# Patient Record
Sex: FEMALE | Race: WHITE | NOT HISPANIC OR LATINO | Employment: OTHER | URBAN - METROPOLITAN AREA
[De-identification: names, ages, dates, MRNs, and addresses within clinical notes are randomized per-mention and may not be internally consistent; named-entity substitution may affect disease eponyms.]

---

## 2017-02-06 ENCOUNTER — OFFICE VISIT (OUTPATIENT)
Dept: FAMILY MEDICINE | Facility: CLINIC | Age: 77
End: 2017-02-06
Payer: MEDICARE

## 2017-02-06 VITALS
SYSTOLIC BLOOD PRESSURE: 159 MMHG | HEART RATE: 63 BPM | RESPIRATION RATE: 18 BRPM | DIASTOLIC BLOOD PRESSURE: 105 MMHG | BODY MASS INDEX: 27.31 KG/M2 | WEIGHT: 160 LBS | HEIGHT: 64 IN | TEMPERATURE: 98.3 F

## 2017-02-06 DIAGNOSIS — E03.9 HYPOTHYROIDISM, UNSPECIFIED TYPE: Primary | ICD-10-CM

## 2017-02-06 LAB
T4 FREE SERPL-MCNC: 0.8 NG/DL (ref 0.76–1.46)
TSH SERPL DL<=0.005 MIU/L-ACNC: 4.8 MU/L (ref 0.4–4)

## 2017-02-06 PROCEDURE — 84443 ASSAY THYROID STIM HORMONE: CPT | Performed by: FAMILY MEDICINE

## 2017-02-06 PROCEDURE — 84439 ASSAY OF FREE THYROXINE: CPT | Performed by: FAMILY MEDICINE

## 2017-02-06 PROCEDURE — 36415 COLL VENOUS BLD VENIPUNCTURE: CPT | Performed by: FAMILY MEDICINE

## 2017-02-06 PROCEDURE — 99214 OFFICE O/P EST MOD 30 MIN: CPT | Performed by: FAMILY MEDICINE

## 2017-02-06 RX ORDER — PYRIDOXINE HCL (VITAMIN B6) 100 MG
TABLET ORAL
COMMUNITY
End: 2017-07-01

## 2017-02-06 RX ORDER — THYROID 60 MG/1
60 TABLET ORAL 2 TIMES DAILY
Qty: 180 TABLET | Refills: 3 | Status: CANCELLED | OUTPATIENT
Start: 2017-02-06

## 2017-02-06 RX ORDER — PYRIDOXINE HCL (VITAMIN B6) 100 MG
1 TABLET ORAL DAILY
COMMUNITY
End: 2022-05-03

## 2017-02-06 NOTE — MR AVS SNAPSHOT
"              After Visit Summary   2/6/2017    Catherine Salgado    MRN: [id number]           Patient Information     Date Of Birth          1940        Visit Information        Provider Department      2/6/2017 11:20 AM Hi Gaitan MD [facility]        Today's Diagnoses     Hypothyroidism, unspecified type    -  1       Care Instructions          Thank you for choosing [facility].  You may be receiving a survey in the mail from [facility] regarding your visit today.  Please take a few minutes to complete and return the survey to let us know how we are doing.      Our Clinic hours are:  Mondays    7:20 am - 7 pm  Tues -  Fri  7:20 am - 5 pm    Clinic Phone: [phone]    The clinic lab opens at 7:30 am Mon - Fri and appointments are required.    [facility]  Ph. [phone]  Monday-Thursday 8 am - 7pm  Tues/Wed/Fri 8 am - 5:30 pm               Follow-ups after your visit        Who to contact     If you have questions or need follow up information about today's clinic visit or your schedule please contact [facility] directly at [phone].  Normal or non-critical lab and imaging results will be communicated to you by MyChart, letter or phone within 4 business days after the clinic has received the results. If you do not hear from us within 7 days, please contact the clinic through MyChart or phone. If you have a critical or abnormal lab result, we will notify you by phone as soon as possible.  Submit refill requests through [employer] or call your pharmacy and they will forward the refill request to us. Please allow 3 business days for your refill to be completed.          Additional Information About Your Visit        MyChart Information     [employer] lets you send messages to your doctor, view your test results, renew your prescriptions, schedule appointments and more. To sign up, go to [email or web address] . Click on \"Log in\" on the left " "side of the screen, which will take you to the Welcome page. Then click on \"Sign up Now\" on the right side of the page.     You will be asked to enter the access code listed below, as well as some personal information. Please follow the directions to create your username and password.     Your access code is: [email or web address]  Expires: 2017  1:25 PM     Your access code will  in 90 days. If you need help or a new code, please call your [place] clinic or [phone].        Care EveryWhere ID     This is your Care EveryWhere ID. This could be used by other organizations to access your [place] medical records  [id number]        Your Vitals Were     Pulse Temperature Respirations Height BMI (Body Mass Index)       63 98.3  F (36.8  C) 18 5' 4\" (1.626 m) 27.45 kg/m2        Blood Pressure from Last 3 Encounters:   17 159/105   16 138/84   16 154/91    Weight from Last 3 Encounters:   17 160 lb (72.576 kg)   16 158 lb (71.668 kg)   16 152 lb 6.4 oz (69.128 kg)              We Performed the Following     TSH with free T4 reflex          Today's Medication Changes          These changes are accurate as of: 17  1:25 PM.  If you have any questions, ask your nurse or doctor.               These medicines have changed or have updated prescriptions.        Dose/Directions    thyroid 60 MG tablet   Commonly known as:  ARMOUR THYROID   This may have changed:  when to take this   Used for:  Hyperlipidemia, unspecified hyperlipidemia        Dose:  60 mg   Take 1 tablet (60 mg) by mouth 2 times daily   Quantity:  180 tablet   Refills:  3         Stop taking these medicines if you haven't already. Please contact your care team if you have questions.     ASHWAGANDHA PO   Stopped by:  Hi Gaitan MD           predniSONE 20 MG tablet   Commonly known as:  DELTASONE   Stopped by:  Hi Gaitan MD           UNABLE TO FIND   Stopped by:  Hi Gaitan MD                    Primary " Care Provider Office Phone # Fax #    Trip Correia PA-C [phone]       [address] [address]        Thank you!     Thank you for choosing [facility]  for your care. Our goal is always to provide you with excellent care. Hearing back from our patients is one way we can continue to improve our services. Please take a few minutes to complete the written survey that you may receive in the mail after your visit with us. Thank you!             Your Updated Medication List - Protect others around you: Learn how to safely use, store and throw away your medicines at www.disposemymeds.org.          This list is accurate as of: 2/6/17  1:25 PM.  Always use your most recent med list.                   Brand Name Dispense Instructions for use    aspirin 81 MG tablet      Take 162 mg by mouth 2 times daily       CO Q 10 PO      Take 200 mg by mouth daily       Garlic 500 MG Tabs          Iron 18 MG Tbcr      Take by mouth every 3 days       KRILL OIL PO      Take 500 mg by mouth daily       LUTEIN-ZEAXANTHIN PO      Take 20 mg by mouth daily       PROBIOTIC PO      Take 1 capsule by mouth At Bedtime       SELENIUM PO      Take 1 capsule by mouth daily       thyroid 60 MG tablet    ARMOUR THYROID    180 tablet    Take 1 tablet (60 mg) by mouth 2 times daily       vitamin B complex with vitamin C Tabs tablet      Take 1 tablet by mouth daily       VITAMIN C PO      Take 1,000 mg by mouth daily sometimes       Vitamin D-3 5000 UNITS Tabs      Take 1 tablet by mouth daily.

## 2017-02-06 NOTE — Clinical Note
[facility]  [address]  [place] [address]  Phone: [phone]    February 7, 2017    Catherine Salgado  [facility] [place] [address]          Dear Ms. Salgado,    The results of your recent lab tests were acceptable. Enclosed is a copy of these results.  If you have any further questions or problems, please contact our office.    Sincerely,      Hi Gaitan MD/ la

## 2017-02-06 NOTE — NURSING NOTE
"Initial /105 mmHg  Pulse 63  Temp(Src) 98.3  F (36.8  C)  Resp 18  Ht 5' 4\" (1.626 m)  Wt 160 lb (72.576 kg)  BMI 27.45 kg/m2 Estimated body mass index is 27.45 kg/(m^2) as calculated from the following:    Height as of this encounter: 5' 4\" (1.626 m).    Weight as of this encounter: 160 lb (72.576 kg). .    Chief Complaint   Patient presents with     Thyroid Problem     follow up      CVA     follow up      Macy Ribeiro CMA    "

## 2017-02-06 NOTE — PROGRESS NOTES
SUBJECTIVE:                                                    Catherine Salgado is a 76 year old female who presents to clinic today for the following health issues:      Chief Complaint   Patient presents with     Thyroid Problem     follow up      CVA     follow up          Hypothyroidism Follow-up      Since last visit, patient describes the following symptoms: Weight stable, no hair loss, no skin changes, no constipation, no loose stools       Amount of exercise or physical activity: None    Problems taking medications regularly: No    Medication side effects: none    Diet: regular (no restrictions)        Problem list and histories reviewed & adjusted, as indicated.  Additional history: as documented    OBJECTIVE:  LUNGS: clear to auscultation, normal breath sounds  CV: RRR without murmur  ABD: BS+, soft, nontender, no masses, no hepatosplenomegaly  EXTREMITIES: without joint tenderness, swelling or erythema.  No muscle tenderness or abnormality.  SKIN: No rashes or abnormalities  NEURO:non focal exam    ASSESSMENT:  Hypothyroidism, unspecified type    PLAN:  Orders Placed This Encounter     TSH with free T4 reflex     Garlic 500 MG TABS     Ferrous Fumarate (IRON) 18 MG TBCR     vitamin B complex with vitamin C (VITAMIN  B COMPLEX) TABS tablet

## 2017-02-24 ENCOUNTER — OFFICE VISIT (OUTPATIENT)
Dept: FAMILY MEDICINE | Facility: CLINIC | Age: 77
End: 2017-02-24
Payer: MEDICARE

## 2017-02-24 VITALS
HEART RATE: 62 BPM | TEMPERATURE: 97.5 F | WEIGHT: 160 LBS | HEIGHT: 64 IN | SYSTOLIC BLOOD PRESSURE: 156 MMHG | BODY MASS INDEX: 27.31 KG/M2 | DIASTOLIC BLOOD PRESSURE: 90 MMHG

## 2017-02-24 DIAGNOSIS — E03.9 HYPOTHYROIDISM, UNSPECIFIED TYPE: Primary | ICD-10-CM

## 2017-02-24 LAB — T3 SERPL-MCNC: 157 NG/DL (ref 60–181)

## 2017-02-24 PROCEDURE — 99213 OFFICE O/P EST LOW 20 MIN: CPT | Performed by: FAMILY MEDICINE

## 2017-02-24 PROCEDURE — 36415 COLL VENOUS BLD VENIPUNCTURE: CPT | Performed by: FAMILY MEDICINE

## 2017-02-24 PROCEDURE — 84480 ASSAY TRIIODOTHYRONINE (T3): CPT | Performed by: FAMILY MEDICINE

## 2017-02-24 RX ORDER — VITAMIN E 268 MG
CAPSULE ORAL DAILY
Qty: 30 CAPSULE | COMMUNITY
Start: 2017-02-24 | End: 2017-07-01

## 2017-02-24 NOTE — LETTER
[facility]  [address] [place] [address]  Phone: [phone]    February 27, 2017    Catherine Salgado  [address] [address]          Dear Ms. Salgado,    The results of your recent lab tests were within normal limits.  Component      Latest Ref Rng & Units 2/24/2017   Triiodothyronine (T3)      60 - 181 ng/dL 157   .  If you have any further questions or problems, please contact our office.    Sincerely,      Suresh Kingston MD / cb

## 2017-02-24 NOTE — PATIENT INSTRUCTIONS
Thank you for choosing [facility].  You may be receiving a survey in the mail from [employer] regarding your visit today.  Please take a few minutes to complete and return the survey to let us know how we are doing.      If you have questions or concerns, please contact us via [email or web address] or you can contact your care team at [phone].    Our Clinic hours are:  Monday 6:40 am  to 7:00 pm  Tuesday -Friday 6:40 am to 5:00 pm    The [place] outpatient lab hours are:  Monday - Friday 6:10 am to 4:45 pm  Saturdays 7:00 am to 11:00 am  Appointments are required, call [phone]    If you have clinical questions after hours or would like to schedule an appointment,  call the clinic at [phone].

## 2017-02-24 NOTE — NURSING NOTE
"Chief Complaint   Patient presents with     Thyroid Disease       Initial BP (!) 152/93  Pulse 62  Temp 97.5  F (36.4  C) (Tympanic)  Ht 5' 4\" (1.626 m)  Wt 160 lb (72.6 kg)  BMI 27.46 kg/m2 Estimated body mass index is 27.46 kg/(m^2) as calculated from the following:    Height as of this encounter: 5' 4\" (1.626 m).    Weight as of this encounter: 160 lb (72.6 kg).  Medication Reconciliation: complete  "

## 2017-02-24 NOTE — PROGRESS NOTES
SUBJECTIVE:                                                    Catherine Slagado is a 76 year old female who presents to clinic today for the following health issues:      Hypothyroidism Follow-up      Since last visit, patient describes the following symptoms: Weight stable, no hair loss, no skin changes, no constipation, no loose stools, constipation and hair loss       Amount of exercise or physical activity: 2-3 days/week for an average of 15-30 minutes    Problems taking medications regularly: yes armor    Medication side effects: heart racing and shaky  Diet: regular (no restrictions)  Concern - shaky and heart racing      Onset: 1yr ago     Description:   Patient feels that the [place] is the cause for the shakiness and heart racing she was taking 2 tablets daily 1 in the AM and 1 in the PM she cut it back to 1 in the AM and is doing     Intensity: moderate    Progression of Symptoms:  Improving since cutting back on the [place]     Accompanying Signs & Symptoms:  When she was Taking 2 tablets        Previous history of similar problem:   None before starting Tremont     Precipitating factors:   Worsened by: with 2 tablets of the [place]     Alleviating factors:  Improved by: when she cut back on the [place] to 1 tablet daily        Therapies Tried and outcome: patient has been seen before and blood test done       Patient is a 76 yr old female. She comes in with concerns about her thyroid. She reports that she was diagnosed about 10 yrs ago. She was started on Levothyroxine which she claims did not really help her symptoms. Patient states that she went to a [facility] doctor who started her on armour thyroid. She appeared to have been doing well until recently and now she feels she is overmedicated and wants her labs done to see . Explained to patient that the endocrinologist recommend against using armour thyroid because it is hard to monitor thyroid levels. She was not happy about this . She wanted all Thyroid  "\" labs done . Patient reports that she has been having palpitations but other that since she has been stable with her weight . No other complaints today.     Problem list and histories reviewed & adjusted, as indicated.  Additional history: as documented    Patient Active Problem List   Diagnosis     Hypertension goal BP (blood pressure) < 140/90     Macular degeneration (senile) of retina     Hyperlipidemia     Phlebitis and thrombophlebitis of other deep vessels of lower extremities     Bunion     Generalized osteoarthrosis, unspecified site     HYPERLIPIDEMIA LDL GOAL <130     Advanced directives, counseling/discussion     Hypothyroidism     IT band syndrome     Cerebral infarction (H)     Health Care Home     Past Surgical History   Procedure Laterality Date     Surgical history of -        T&A     Surgical history of -   10/1976     Vaginal hysterectomy, A & P repair     Colonoscopy  5/27/2005     Diverticulosis       Social History   Substance Use Topics     Smoking status: Current Some Day Smoker     Packs/day: 0.25     Years: 40.00     Types: Cigarettes     Last attempt to quit: 10/31/2015     Smokeless tobacco: Never Used     Alcohol use Yes      Comment: rare     Family History   Problem Relation Age of Onset     CEREBROVASCULAR DISEASE Mother      bianca ALBERTASHIRLEY Father      CANCER Father      kidney CA     Cardiovascular Sister      cardiac arrest     Musculoskeletal Disorder Daughter      rotator cuff, knee repair     DIABETES Sister      Musculoskeletal Disorder Daughter      ankle and arm injuries     Musculoskeletal Disorder Daughter      Knees     Hypertension Sister          Current Outpatient Prescriptions   Medication Sig Dispense Refill     vitamin E 400 UNIT capsule Take by mouth daily 30 capsule      OVER-THE-COUNTER Beet Juice 500mg in the AM       Garlic 500 MG TABS        Ferrous Fumarate (IRON) 18 MG TBCR Take by mouth every 3 days       vitamin B complex with vitamin C " "(VITAMIN  B COMPLEX) TABS tablet Take 1 tablet by mouth daily       SELENIUM PO Take 1 capsule by mouth daily       Probiotic Product (PROBIOTIC PO) Take 1 capsule by mouth At Bedtime        thyroid (ARMOUR THYROID) 60 MG tablet Take 1 tablet (60 mg) by mouth 2 times daily (Patient taking differently: Take 60 mg by mouth daily ) 180 tablet 3     aspirin 81 MG tablet Take 162 mg by mouth 2 times daily       Ascorbic Acid (VITAMIN C PO) Take 1,000 mg by mouth daily sometimes       Coenzyme Q10 (CO Q 10 PO) Take 200 mg by mouth daily        LUTEIN-ZEAXANTHIN PO Take 20 mg by mouth daily        Cholecalciferol (VITAMIN D-3) 5000 UNIT TABS Take 1 tablet by mouth daily.       KRILL OIL PO Take 500 mg by mouth daily       Allergies   Allergen Reactions     Gadavist [Gadobutrol] Other (See Comments)     Did ok if benadryl is given.      Ivp Dye [Contrast Dye] Other (See Comments)     Severe arthritic reaction     Penicillins Hives     Wasps [Hornets] Visual Disturbance     Passes out     BP Readings from Last 3 Encounters:   02/24/17 156/90   02/06/17 (!) 159/105   09/20/16 138/84    Wt Readings from Last 3 Encounters:   02/24/17 160 lb (72.6 kg)   02/06/17 160 lb (72.6 kg)   09/20/16 158 lb (71.7 kg)                  Labs reviewed in EPIC    ROS:  Constitutional, HEENT, cardiovascular, pulmonary, gi and gu systems are negative, except as otherwise noted.    OBJECTIVE:                                                    /90  Pulse 62  Temp 97.5  F (36.4  C) (Tympanic)  Ht 5' 4\" (1.626 m)  Wt 160 lb (72.6 kg)  BMI 27.46 kg/m2  Body mass index is 27.46 kg/(m^2).  GENERAL: healthy, alert and no distress  NECK: no adenopathy, no asymmetry, masses, or scars and thyroid normal to palpation  RESP: lungs clear to auscultation - no rales, rhonchi or wheezes  CV: regular rate and rhythm, normal S1 S2, no S3 or S4, no murmur, click or rub, no peripheral edema and peripheral pulses strong  ABDOMEN: soft, nontender, no " hepatosplenomegaly, no masses and bowel sounds normal  MS: no gross musculoskeletal defects noted, no edema    Diagnostic Test Results:  Results for orders placed or performed in visit on 02/24/17   T3, total   Result Value Ref Range    Triiodothyronine (T3) 157 60 - 181 ng/dL        ASSESSMENT/PLAN:                                                    (E03.9) Hypothyroidism, unspecified type  (primary encounter diagnosis)  Comment: Highly  recommend that she stop the armour thyroid. Also recommended being seen by endocrinologist .  Plan: T3, total    Also blood pressure is high today , patient has been told that she is hypertensive but refuses to take medication  .      FUTURE APPOINTMENTS:       - Follow-up visit as needed.    Suresh Kingston MD  [facility]

## 2017-02-24 NOTE — MR AVS SNAPSHOT
After Visit Summary   2/24/2017    Catherine Salgado    MRN: [id number]           Patient Information     Date Of Birth          1940        Visit Information        Provider Department      2/24/2017 10:40 AM Suresh Kingston MD [facility]        Today's Diagnoses     Hypothyroidism, unspecified type    -  1      Care Instructions          Thank you for choosing [facility]  You may be receiving a survey in the mail from [employer] regarding your visit today.  Please take a few minutes to complete and return the survey to let us know how we are doing.      If you have questions or concerns, please contact us via [email or web address] or you can contact your care team at [phone].    Our Clinic hours are:  Monday 6:40 am  to 7:00 pm  Tuesday -Friday 6:40 am to 5:00 pm    The [place] outpatient lab hours are:  Monday - Friday 6:10 am to 4:45 pm  Saturdays 7:00 am to 11:00 am  Appointments are required, call [phone]    If you have clinical questions after hours or would like to schedule an appointment,  call the clinic at [phone].        Follow-ups after your visit        Your next 10 appointments already scheduled     Mar 06, 2017  9:00 AM CST   SHORT with BERTRAM LANDEROS/JAZZ RN   [facility]    [address] [address] [phone]              Who to contact     If you have questions or need follow up information about today's clinic visit or your schedule please contact [facility] directly at [phone].  Normal or non-critical lab and imaging results will be communicated to you by MyChart, letter or phone within 4 business days after the clinic has received the results. If you do not hear from us within 7 days, please contact the clinic through [employer] or phone. If you have a critical or abnormal lab result, we will notify you by phone as soon as possible.  Submit refill requests through  "[employer] or call your pharmacy and they will forward the refill request to us. Please allow 3 business days for your refill to be completed.          Additional Information About Your Visit        MyChart Information     [employer] lets you send messages to your doctor, view your test results, renew your prescriptions, schedule appointments and more. To sign up, go to [email or web address] . Click on \"Log in\" on the left side of the screen, which will take you to the Welcome page. Then click on \"Sign up Now\" on the right side of the page.     You will be asked to enter the access code listed below, as well as some personal information. Please follow the directions to create your username and password.     Your access code is: [email or web address]  Expires: 2017  1:25 PM     Your access code will  in 90 days. If you need help or a new code, please call your [place] clinic or [phone].        Care EveryWhere ID     This is your Care EveryWhere ID. This could be used by other organizations to access your [place] medical records  [id number]        Your Vitals Were     Pulse Temperature Height BMI (Body Mass Index)          62 97.5  F (36.4  C) (Tympanic) 5' 4\" (1.626 m) 27.46 kg/m2         Blood Pressure from Last 3 Encounters:   17 156/90   17 (!) 159/105   16 138/84    Weight from Last 3 Encounters:   17 160 lb (72.6 kg)   17 160 lb (72.6 kg)   16 158 lb (71.7 kg)              We Performed the Following     T3, total          Today's Medication Changes          These changes are accurate as of: 17 11:59 PM.  If you have any questions, ask your nurse or doctor.               These medicines have changed or have updated prescriptions.        Dose/Directions    thyroid 60 MG tablet   Commonly known as:  ARMOUR THYROID   This may have changed:  when to take this   Used for:  Hyperlipidemia, unspecified hyperlipidemia        Dose:  60 mg   Take 1 tablet (60 mg) by mouth 2 " times daily   Quantity:  180 tablet   Refills:  3                Primary Care Provider Office Phone # Fax #    Trip Correia PA-C [phone]       [facility] [address] [address]        Thank you!     Thank you for choosing [facility]  for your care. Our goal is always to provide you with excellent care. Hearing back from our patients is one way we can continue to improve our services. Please take a few minutes to complete the written survey that you may receive in the mail after your visit with us. Thank you!             Your Updated Medication List - Protect others around you: Learn how to safely use, store and throw away your medicines at www.disposemymeds.org.          This list is accurate as of: 2/24/17 11:59 PM.  Always use your most recent med list.                   Brand Name Dispense Instructions for use    aspirin 81 MG tablet      Take 162 mg by mouth 2 times daily       CO Q 10 PO      Take 200 mg by mouth daily       Garlic 500 MG Tabs          Iron 18 MG Tbcr      Take by mouth every 3 days       KRILL OIL PO      Take 500 mg by mouth daily       LUTEIN-ZEAXANTHIN PO      Take 20 mg by mouth daily       OVER-THE-COUNTER      Beet Juice 500mg in the AM       PROBIOTIC PO      Take 1 capsule by mouth At Bedtime       SELENIUM PO      Take 1 capsule by mouth daily       thyroid 60 MG tablet    ARMOUR THYROID    180 tablet    Take 1 tablet (60 mg) by mouth 2 times daily       vitamin B complex with vitamin C Tabs tablet      Take 1 tablet by mouth daily       VITAMIN C PO      Take 1,000 mg by mouth daily sometimes       Vitamin D-3 5000 UNITS Tabs      Take 1 tablet by mouth daily.       vitamin E 400 UNIT capsule     30 capsule    Take by mouth daily

## 2017-02-27 NOTE — PROGRESS NOTES
Please inform patient that test result was within normal parameters.   Thank you.     Suresh Kingston M.D.

## 2017-06-08 ENCOUNTER — HOSPITAL ENCOUNTER (EMERGENCY)
Facility: CLINIC | Age: 77
Discharge: HOME | End: 2017-06-08
Attending: FAMILY MEDICINE | Admitting: FAMILY MEDICINE
Payer: MEDICARE

## 2017-06-08 VITALS
RESPIRATION RATE: 22 BRPM | OXYGEN SATURATION: 92 % | TEMPERATURE: 98 F | DIASTOLIC BLOOD PRESSURE: 87 MMHG | SYSTOLIC BLOOD PRESSURE: 139 MMHG

## 2017-06-08 DIAGNOSIS — M25.511 ACUTE PAIN OF RIGHT SHOULDER: ICD-10-CM

## 2017-06-08 PROCEDURE — 99283 EMERGENCY DEPT VISIT LOW MDM: CPT | Mod: Z6 | Performed by: FAMILY MEDICINE

## 2017-06-08 PROCEDURE — 99282 EMERGENCY DEPT VISIT SF MDM: CPT

## 2017-06-08 ASSESSMENT — ENCOUNTER SYMPTOMS
CARDIOVASCULAR NEGATIVE: 1
NEUROLOGICAL NEGATIVE: 1
ALLERGIC/IMMUNOLOGIC NEGATIVE: 1
EYES NEGATIVE: 1
PSYCHIATRIC NEGATIVE: 1
ENDOCRINE NEGATIVE: 1
RESPIRATORY NEGATIVE: 1
CONSTITUTIONAL NEGATIVE: 1
HEMATOLOGIC/LYMPHATIC NEGATIVE: 1
GASTROINTESTINAL NEGATIVE: 1

## 2017-06-08 NOTE — ED AVS SNAPSHOT
[facility] Emergency Department    [address] [address]    Phone:  [phone]    Fax:  [phone]                                       Catherine Salgado   MRN: [id number]    Department:  [facility] Emergency Department   Date of Visit:  6/8/2017           Patient Information     Date Of Birth          1940        Your diagnoses for this visit were:     Acute pain of right shoulder        You were seen by Mark Zhou MD.      Follow-up Information     Schedule an appointment as soon as possible for a visit with Trip Correia PA-C.    Specialty:  Physician Assistant    Why:  As needed, If symptoms worsen    Contact information:    [address] [address]  [phone]          Discharge Instructions       Warm pack the affected area should the pain recur.  Aspercream would be fine, you may try Tylenol or ibuprofen as needed.  Return to the emergency department for some changes.      24 Hour Appointment Hotline       To make an appointment at any [facility], call [phone] ([phone]). If you don't have a family doctor or clinic, we will help you find one. [place] clinics are conveniently located to serve the needs of you and your family.             Review of your medicines      Our records show that you are taking the medicines listed below. If these are incorrect, please call your family doctor or clinic.        Dose / Directions Last dose taken    aspirin 81 MG tablet   Dose:  162 mg        Take 162 mg by mouth 2 times daily   Refills:  0        CO Q 10 PO   Dose:  200 mg        Take 200 mg by mouth daily   Refills:  0        Garlic 500 MG Tabs        Refills:  0        Iron 18 MG Tbcr        Take by mouth every 3 days   Refills:  0        KRILL OIL PO   Dose:  500 mg        Take 500 mg by mouth daily   Refills:  0        LUTEIN-ZEAXANTHIN PO   Dose:  20 mg        Take 20 mg by mouth daily   Refills:  0         OVER-THE-COUNTER        Beet Juice 500mg in the AM   Refills:  0        PROBIOTIC PO   Dose:  1 capsule        Take 1 capsule by mouth At Bedtime   Refills:  0        SELENIUM PO   Dose:  1 capsule        Take 1 capsule by mouth daily   Refills:  0        thyroid 60 MG tablet   Commonly known as:  ARMOUR THYROID   Dose:  60 mg   Quantity:  180 tablet        Take 1 tablet (60 mg) by mouth 2 times daily   Refills:  3        vitamin B complex with vitamin C Tabs tablet   Dose:  1 tablet        Take 1 tablet by mouth daily   Refills:  0        VITAMIN C PO   Dose:  1000 mg        Take 1,000 mg by mouth daily sometimes   Refills:  0        Vitamin D-3 5000 UNITS Tabs   Dose:  1 tablet        Take 1 tablet by mouth daily.   Refills:  0        vitamin E 400 UNIT capsule   Quantity:  30 capsule        Take by mouth daily   Refills:  0                Orders Needing Specimen Collection     None      Pending Results     No orders found from 6/6/2017 to 6/9/2017.            Pending Culture Results     No orders found from 6/6/2017 to 6/9/2017.            Pending Results Instructions     If you had any lab results that were not finalized at the time of your Discharge, you can call the ED Lab Result RN at [phone]. You will be contacted by this team for any positive Lab results or changes in treatment. The nurses are available 7 days a week from 10A to 6:30P.  You can leave a message 24 hours per day and they will return your call.        Test Results From Your Hospital Stay               Thank you for choosing [facility]       Thank you for choosing [facility] for your care. Our goal is always to provide you with excellent care. Hearing back from our patients is one way we can continue to improve our services. Please take a few minutes to complete the written survey that you may receive in the mail after you visit with us. Thank you!        [employer] Information     Penem[facility] lets you send messages to your doctor, view your test  "results, renew your prescriptions, schedule appointments and more. To sign up, go to [email or web address] . Click on \"Log in\" on the left side of the screen, which will take you to the Welcome page. Then click on \"Sign up Now\" on the right side of the page.     You will be asked to enter the access code listed below, as well as some personal information. Please follow the directions to create your username and password.     Your access code is: [email or web address]  Expires: 2017 10:07 PM     Your access code will  in 90 days. If you need help or a new code, please call your [place] clinic or [phone].        Care EveryWhere ID     This is your Care EveryWhere ID. This could be used by other organizations to access your [place] medical records  [id number]        After Visit Summary       This is your record. Keep this with you and show to your community pharmacist(s) and doctor(s) at your next visit.                  "

## 2017-06-08 NOTE — ED AVS SNAPSHOT
[facility] Emergency Department    [address] [address]    Phone:  [phone]    Fax:  [phone]                                       Catherine Salgado   MRN: [id number]    Department:  [facility] Emergency Department   Date of Visit:  6/8/2017           After Visit Summary Signature Page     I have received my discharge instructions, and my questions have been answered. I have discussed any challenges I see with this plan with the nurse or doctor.    ..........................................................................................................................................  Patient/Patient Representative Signature      ..........................................................................................................................................  Patient Representative Print Name and Relationship to Patient    ..................................................               ................................................  Date                                            Time    ..........................................................................................................................................  Reviewed by Signature/Title    ...................................................              ..............................................  Date                                                            Time

## 2017-06-09 NOTE — ED PROVIDER NOTES
"  History     Chief Complaint   Patient presents with     Shoulder Pain     sharp, radiating up side of head     Hypertension     HPI  Catherine Salgado is a 77 year old female, past medical history significant for hypertension, hyperlipidemia, osteoarthritis, hypothyroidism, CVA, presents to the emergency department with concerns of pain in her right shoulder scapular area that began approximately 2 hours prior to ER presentation.  The pain was sharp and intense and radiated from the periscapular area up into her right neck and right head area.  The patient applied some Aspercreme and took 2 aspirin orally.  At the time of my evaluation she notes absolutely no pain and feels like she has wasted her time coming in.  The patient did not have concerns about her blood pressure concerns were expressed at triage with her elevated blood pressure by staff.  Repeat check of her blood pressure finds much more acceptable readings without directed intervention.  The patient and her  note many strains on her upper back and shoulders over the last several days including planting flowers, pulling weeds, moving boxes at home.  The patient notes no associated chest pain shortness of breath palpitations nausea or vomiting.  She notes no headache per se just the radiating pain from the right scapula.  There is no blurred vision double vision.  No balance changes.  Her  was with her notes no changes in her speech or her behavior.  She seems appropriate.  The patient came in because she was concerned that she had a CVA in the past was atypical where she had \"flulike illness\".      Active Ambulatory Problems     Diagnosis Date Noted     Hypertension goal BP (blood pressure) < 140/90      Macular degeneration (senile) of retina      Hyperlipidemia      Phlebitis and thrombophlebitis of other deep vessels of lower extremities      Bunion      Generalized osteoarthrosis, unspecified site      HYPERLIPIDEMIA LDL GOAL <130 " 10/31/2010     Advanced directives, counseling/discussion 06/26/2013     Hypothyroidism 08/14/2013     IT band syndrome 03/24/2014     Cerebral infarction (H) 10/31/2015     Health Care Home 12/31/2015     Resolved Ambulatory Problems     Diagnosis Date Noted     TOBACCO ABUSE-CONTINUOUS      Past Medical History:   Diagnosis Date     Bunion      Chest pain, unspecified      Generalized osteoarthrosis, unspecified site      Macular degeneration (senile) of retina, unspecified      Other and unspecified hyperlipidemia      Phlebitis and thrombophlebitis of other deep vessels of lower extremities      TOBACCO ABUSE-CONTINUOUS      Unspecified essential hypertension      Past Surgical History:   Procedure Laterality Date     COLONOSCOPY  5/27/2005    Diverticulosis     SURGICAL HISTORY OF -       T&A     SURGICAL HISTORY OF -   10/1976    Vaginal hysterectomy, A & P repair     Social History     Social History     Marital status:      Spouse name: N/A     Number of children: N/A     Years of education: N/A     Occupational History     Not on file.     Social History Main Topics     Smoking status: Current Some Day Smoker     Packs/day: 0.25     Years: 40.00     Types: Cigarettes     Last attempt to quit: 10/31/2015     Smokeless tobacco: Never Used     Alcohol use Yes      Comment: rare     Drug use: No     Sexual activity: Yes     Partners: Male     Birth control/ protection: Surgical     Other Topics Concern     Parent/Sibling W/ Cabg, Mi Or Angioplasty Before 65f 55m? Yes     father     Social History Narrative     Family History   Problem Relation Age of Onset     CEREBROVASCULAR DISEASE Mother      bianca JULIO Father      CANCER Father      kidney CA     Cardiovascular Sister      cardiac arrest     Musculoskeletal Disorder Daughter      rotator cuff, knee repair     DIABETES Sister      Musculoskeletal Disorder Daughter      ankle and arm injuries     Musculoskeletal Disorder Daughter       Knees     Hypertension Sister      No current facility-administered medications for this encounter.      Current Outpatient Prescriptions   Medication     vitamin E 400 UNIT capsule     OVER-THE-COUNTER     Garlic 500 MG TABS     Ferrous Fumarate (IRON) 18 MG TBCR     vitamin B complex with vitamin C (VITAMIN  B COMPLEX) TABS tablet     SELENIUM PO     Probiotic Product (PROBIOTIC PO)     thyroid (ARMOUR THYROID) 60 MG tablet     aspirin 81 MG tablet     Ascorbic Acid (VITAMIN C PO)     KRILL OIL PO     Coenzyme Q10 (CO Q 10 PO)     LUTEIN-ZEAXANTHIN PO     Cholecalciferol (VITAMIN D-3) 5000 UNIT TABS        Allergies   Allergen Reactions     Wasps [Hornets] Anaphylaxis     Passes out     Gadavist [Gadobutrol] Other (See Comments)     Did ok if benadryl is given.      Ivp Dye [Contrast Dye] Other (See Comments)     Severe arthritic reaction     Penicillins Hives       I have reviewed the Medications, Allergies, Past Medical and Surgical History, and Social History in the Epic system.    Review of Systems   Constitutional: Negative.    HENT: Negative.    Eyes: Negative.    Respiratory: Negative.    Cardiovascular: Negative.    Gastrointestinal: Negative.    Endocrine: Negative.    Genitourinary: Negative.    Musculoskeletal:        Shoulder pain   Skin: Negative.    Allergic/Immunologic: Negative.    Neurological: Negative.    Hematological: Negative.    Psychiatric/Behavioral: Negative.        Physical Exam   BP: (!) 185/116  Heart Rate: 61  Temp: 98  F (36.7  C)  Resp: 22  SpO2: 96 %  Physical Exam   Constitutional: She appears well-developed and well-nourished.   HENT:   Head: Normocephalic and atraumatic.   Right Ear: External ear normal.   Left Ear: External ear normal.   Nose: Nose normal.   Mouth/Throat: Oropharynx is clear and moist.   Eyes: Conjunctivae and EOM are normal. Pupils are equal, round, and reactive to light.   Neck: Normal range of motion. Neck supple.   Cardiovascular: Normal rate, regular  rhythm, normal heart sounds and intact distal pulses.    Musculoskeletal:        Back:    Nursing note and vitals reviewed.      ED Course     ED Course     Procedures             Critical Care time:  none               Labs Ordered and Resulted from Time of ED Arrival Up to the Time of Departure from the ED - No data to display    Assessments & Plan (with Medical Decision Making)   77-year-old female past medical history reviewed as above, presents to the emergency department with concerns of shoulder pain as described in the HPI.  Physical exam finds the patient completely asymptomatic, having taken Aspercreme and aspirin prior to arrival.  I was however on exam able to elicit some point tenderness and reproduce the pain patient came in with.  I think that her pain is most likely musculoskeletal in etiology, potentially some irritation of scapulothoracic nerve, I have low suspicion based upon history and physical exam as well as behavior with therapeutic intervention prior to her arrival that this represents anything other something potentially more serious.  I did discuss differential diagnosis with him and they decided against any further workup.  I think this is perfectly acceptable and reasonable.  I do not feel the patient requires additional workup at this time.  I do expect that she will likely have some pain come back on the medications wear off and we discussed this.  Should she have any additional concerns she is welcome to return at any time.  Disposition home to the care of her  in improved condition.      Disclaimer: This note consists of symbols derived from keyboarding, dictation and/or voice recognition software. As a result, there may be errors in the script that have gone undetected. Please consider this when interpreting information found in this chart.      I have reviewed the nursing notes.    I have reviewed the findings, diagnosis, plan and need for follow up with the  patient.    Discharge Medication List as of 6/8/2017 10:10 PM          Final diagnoses:   Acute pain of right shoulder       6/8/2017   [facility] EMERGENCY DEPARTMENT     Mark Zhou MD  06/09/17 [id number]

## 2017-06-09 NOTE — ED NOTES
"Pt states that around 2030 developed pain by right shoulder blade and that \"sharp\" pain traveled up into ear and onto the top of her head. Pain was reproducible upon palpation by patient @ home. Pt states \"felt like a pinched nerve\". Sig other states that pt was helping him lift some wood 2 days ago. Pt also is concerned regarding high blood pressure, HX hypertension but stopped her blood medication \"months ago\" due to the side effect of coughing. Shoulder blade pain has resolved since arrival, denies CP, headache or shortness of breath. HX of stroke in Oct 2015, no deficits noted. Took 2 baby ASA tonight PTA.  "

## 2017-06-09 NOTE — DISCHARGE INSTRUCTIONS
Warm pack the affected area should the pain recur.  Aspercream would be fine, you may try Tylenol or ibuprofen as needed.  Return to the emergency department for some changes.

## 2017-07-01 ENCOUNTER — APPOINTMENT (OUTPATIENT)
Dept: CT IMAGING | Facility: CLINIC | Age: 77
End: 2017-07-01
Attending: EMERGENCY MEDICINE
Payer: MEDICARE

## 2017-07-01 ENCOUNTER — HOSPITAL ENCOUNTER (EMERGENCY)
Facility: CLINIC | Age: 77
Discharge: HOME | End: 2017-07-01
Attending: EMERGENCY MEDICINE | Admitting: EMERGENCY MEDICINE
Payer: MEDICARE

## 2017-07-01 VITALS
DIASTOLIC BLOOD PRESSURE: 96 MMHG | RESPIRATION RATE: 10 BRPM | OXYGEN SATURATION: 89 % | HEART RATE: 56 BPM | TEMPERATURE: 98 F | SYSTOLIC BLOOD PRESSURE: 159 MMHG

## 2017-07-01 DIAGNOSIS — M54.2 NECK PAIN: ICD-10-CM

## 2017-07-01 DIAGNOSIS — R03.0 ELEVATED BLOOD PRESSURE READING WITHOUT DIAGNOSIS OF HYPERTENSION: ICD-10-CM

## 2017-07-01 LAB
ALBUMIN SERPL-MCNC: 3.4 G/DL (ref 3.4–5)
ALBUMIN UR-MCNC: NEGATIVE MG/DL
ALP SERPL-CCNC: 69 U/L (ref 40–150)
ALT SERPL W P-5'-P-CCNC: 20 U/L (ref 0–50)
ANION GAP SERPL CALCULATED.3IONS-SCNC: 7 MMOL/L (ref 3–14)
APPEARANCE UR: CLEAR
AST SERPL W P-5'-P-CCNC: 28 U/L (ref 0–45)
BASOPHILS # BLD AUTO: 0 10E9/L (ref 0–0.2)
BASOPHILS NFR BLD AUTO: 0.6 %
BILIRUB SERPL-MCNC: 0.3 MG/DL (ref 0.2–1.3)
BILIRUB UR QL STRIP: NEGATIVE
BUN SERPL-MCNC: 18 MG/DL (ref 7–30)
CALCIUM SERPL-MCNC: 9.5 MG/DL (ref 8.5–10.1)
CHLORIDE SERPL-SCNC: 107 MMOL/L (ref 94–109)
CO2 SERPL-SCNC: 26 MMOL/L (ref 20–32)
COLOR UR AUTO: ABNORMAL
CREAT SERPL-MCNC: 0.8 MG/DL (ref 0.52–1.04)
DIFFERENTIAL METHOD BLD: NORMAL
EOSINOPHIL # BLD AUTO: 0.4 10E9/L (ref 0–0.7)
EOSINOPHIL NFR BLD AUTO: 5.5 %
ERYTHROCYTE [DISTWIDTH] IN BLOOD BY AUTOMATED COUNT: 14.3 % (ref 10–15)
GFR SERPL CREATININE-BSD FRML MDRD: 70 ML/MIN/1.7M2
GLUCOSE SERPL-MCNC: 107 MG/DL (ref 70–99)
GLUCOSE UR STRIP-MCNC: NEGATIVE MG/DL
HCT VFR BLD AUTO: 44.6 % (ref 35–47)
HGB BLD-MCNC: 15 G/DL (ref 11.7–15.7)
HGB UR QL STRIP: NEGATIVE
IMM GRANULOCYTES # BLD: 0 10E9/L (ref 0–0.4)
IMM GRANULOCYTES NFR BLD: 0.1 %
KETONES UR STRIP-MCNC: NEGATIVE MG/DL
LEUKOCYTE ESTERASE UR QL STRIP: ABNORMAL
LYMPHOCYTES # BLD AUTO: 2.7 10E9/L (ref 0.8–5.3)
LYMPHOCYTES NFR BLD AUTO: 38.9 %
MCH RBC QN AUTO: 31.3 PG (ref 26.5–33)
MCHC RBC AUTO-ENTMCNC: 33.6 G/DL (ref 31.5–36.5)
MCV RBC AUTO: 93 FL (ref 78–100)
MONOCYTES # BLD AUTO: 0.7 10E9/L (ref 0–1.3)
MONOCYTES NFR BLD AUTO: 9.9 %
NEUTROPHILS # BLD AUTO: 3.1 10E9/L (ref 1.6–8.3)
NEUTROPHILS NFR BLD AUTO: 45 %
NITRATE UR QL: NEGATIVE
PH UR STRIP: 7.5 PH (ref 5–7)
PLATELET # BLD AUTO: 236 10E9/L (ref 150–450)
POTASSIUM SERPL-SCNC: 4 MMOL/L (ref 3.4–5.3)
PROT SERPL-MCNC: 7.5 G/DL (ref 6.8–8.8)
RBC # BLD AUTO: 4.8 10E12/L (ref 3.8–5.2)
RBC #/AREA URNS AUTO: 0 /HPF (ref 0–2)
SODIUM SERPL-SCNC: 140 MMOL/L (ref 133–144)
SP GR UR STRIP: 1.01 (ref 1–1.03)
SQUAMOUS #/AREA URNS AUTO: <1 /HPF (ref 0–1)
TROPONIN I SERPL-MCNC: NORMAL UG/L (ref 0–0.04)
URN SPEC COLLECT METH UR: ABNORMAL
UROBILINOGEN UR STRIP-MCNC: NORMAL MG/DL (ref 0–2)
WBC # BLD AUTO: 7 10E9/L (ref 4–11)
WBC #/AREA URNS AUTO: 2 /HPF (ref 0–2)

## 2017-07-01 PROCEDURE — 81001 URINALYSIS AUTO W/SCOPE: CPT | Performed by: EMERGENCY MEDICINE

## 2017-07-01 PROCEDURE — 70450 CT HEAD/BRAIN W/O DYE: CPT

## 2017-07-01 PROCEDURE — 25000125 ZZHC RX 250: Performed by: EMERGENCY MEDICINE

## 2017-07-01 PROCEDURE — 71260 CT THORAX DX C+: CPT

## 2017-07-01 PROCEDURE — 80053 COMPREHEN METABOLIC PANEL: CPT | Performed by: EMERGENCY MEDICINE

## 2017-07-01 PROCEDURE — 25000128 H RX IP 250 OP 636: Performed by: EMERGENCY MEDICINE

## 2017-07-01 PROCEDURE — 96375 TX/PRO/DX INJ NEW DRUG ADDON: CPT

## 2017-07-01 PROCEDURE — 99285 EMERGENCY DEPT VISIT HI MDM: CPT | Mod: 25

## 2017-07-01 PROCEDURE — 84484 ASSAY OF TROPONIN QUANT: CPT | Performed by: EMERGENCY MEDICINE

## 2017-07-01 PROCEDURE — 70498 CT ANGIOGRAPHY NECK: CPT

## 2017-07-01 PROCEDURE — 93010 ELECTROCARDIOGRAM REPORT: CPT | Performed by: EMERGENCY MEDICINE

## 2017-07-01 PROCEDURE — 96374 THER/PROPH/DIAG INJ IV PUSH: CPT

## 2017-07-01 PROCEDURE — 96361 HYDRATE IV INFUSION ADD-ON: CPT

## 2017-07-01 PROCEDURE — 85025 COMPLETE CBC W/AUTO DIFF WBC: CPT | Performed by: EMERGENCY MEDICINE

## 2017-07-01 PROCEDURE — 93005 ELECTROCARDIOGRAM TRACING: CPT

## 2017-07-01 PROCEDURE — 99285 EMERGENCY DEPT VISIT HI MDM: CPT | Mod: 25 | Performed by: EMERGENCY MEDICINE

## 2017-07-01 RX ORDER — IOPAMIDOL 755 MG/ML
67 INJECTION, SOLUTION INTRAVASCULAR ONCE
Status: COMPLETED | OUTPATIENT
Start: 2017-07-01 | End: 2017-07-01

## 2017-07-01 RX ORDER — ONDANSETRON 2 MG/ML
4 INJECTION INTRAMUSCULAR; INTRAVENOUS EVERY 30 MIN PRN
Status: DISCONTINUED | OUTPATIENT
Start: 2017-07-01 | End: 2017-07-01 | Stop reason: HOSPADM

## 2017-07-01 RX ORDER — IOPAMIDOL 755 MG/ML
70 INJECTION, SOLUTION INTRAVASCULAR ONCE
Status: COMPLETED | OUTPATIENT
Start: 2017-07-01 | End: 2017-07-01

## 2017-07-01 RX ORDER — METHYLPREDNISOLONE SODIUM SUCCINATE 125 MG/2ML
125 INJECTION, POWDER, LYOPHILIZED, FOR SOLUTION INTRAMUSCULAR; INTRAVENOUS ONCE
Status: COMPLETED | OUTPATIENT
Start: 2017-07-01 | End: 2017-07-01

## 2017-07-01 RX ORDER — HYDROMORPHONE HCL/0.9% NACL/PF 0.2MG/0.2
0.2 SYRINGE (ML) INTRAVENOUS EVERY 30 MIN PRN
Status: DISCONTINUED | OUTPATIENT
Start: 2017-07-01 | End: 2017-07-01 | Stop reason: HOSPADM

## 2017-07-01 RX ADMIN — HYDROMORPHONE HYDROCHLORIDE 0.2 MG: 1 INJECTION, SOLUTION INTRAMUSCULAR; INTRAVENOUS; SUBCUTANEOUS at 09:32

## 2017-07-01 RX ADMIN — IOPAMIDOL 70 ML: 755 INJECTION, SOLUTION INTRAVENOUS at 11:43

## 2017-07-01 RX ADMIN — ONDANSETRON 4 MG: 2 INJECTION INTRAMUSCULAR; INTRAVENOUS at 09:25

## 2017-07-01 RX ADMIN — IOPAMIDOL 67 ML: 755 INJECTION, SOLUTION INTRAVENOUS at 11:44

## 2017-07-01 RX ADMIN — SODIUM CHLORIDE 1000 ML: 9 INJECTION, SOLUTION INTRAVENOUS at 11:43

## 2017-07-01 RX ADMIN — SODIUM CHLORIDE 500 ML: 9 INJECTION, SOLUTION INTRAVENOUS at 09:25

## 2017-07-01 RX ADMIN — SODIUM CHLORIDE 98 ML: 9 INJECTION, SOLUTION INTRAVENOUS at 11:42

## 2017-07-01 RX ADMIN — METHYLPREDNISOLONE SODIUM SUCCINATE 125 MG: 125 INJECTION, POWDER, FOR SOLUTION INTRAMUSCULAR; INTRAVENOUS at 09:27

## 2017-07-01 ASSESSMENT — ENCOUNTER SYMPTOMS
CONSTITUTIONAL NEGATIVE: 1
EYES NEGATIVE: 1
NECK PAIN: 1
CARDIOVASCULAR NEGATIVE: 1
BACK PAIN: 1
NEUROLOGICAL NEGATIVE: 1
PSYCHIATRIC NEGATIVE: 1
ALLERGIC/IMMUNOLOGIC NEGATIVE: 1
GASTROINTESTINAL NEGATIVE: 1
ENDOCRINE NEGATIVE: 1
RESPIRATORY NEGATIVE: 1
HEMATOLOGIC/LYMPHATIC NEGATIVE: 1

## 2017-07-01 NOTE — ED AVS SNAPSHOT
[facility]    [address] [address]    Phone:  [phone]    Fax:  [phone]                                       Catherine Salgado   MRN: [id number]    Department:  [facility]   Date of Visit:  7/1/2017           Patient Information     Date Of Birth          1940        Your diagnoses for this visit were:     Neck pain unclear cause, with spikes in blood pressure    Elevated blood pressure reading without diagnosis of hypertension        You were seen by Landry Gann MD.      Follow-up Information     Follow up with Trip Correia PA-C In 1 week.    Specialty:  Physician Assistant    Why:  As needed, If symptoms worsen. Also to discuss blood pressure medications.    Contact information:    [address] [address] [phone]          Follow up with [facility].    Specialty:  EMERGENCY MEDICINE    Why:  As needed, If symptoms worsen    Contact information:    [address] [place] [address] [phone]    Additional information:    The medical center is located at   [address] [place] [address] ([facility] [address]   [facility] [place], [place] miles north   of [place]).      Discharge References/Attachments     NECK PAIN (ENGLISH)    HYPERTENSION, TO BE CONFIRMED (ENGLISH)      Future Appointments        Provider Department Dept Phone Center    7/5/2017 2:00 PM Hi Gaitan MD [facility] [phone] [facility]      24 Hour Appointment Hotline       To make an appointment at any [facility], call [phone] ([phone]). If you don't have a family doctor or clinic, we will help you find one. [facility] are conveniently located to serve the needs of you and your family.             Review of your medicines      Our records show that you are taking the medicines listed below. If these are incorrect, please call  your family doctor or clinic.        Dose / Directions Last dose taken    aspirin 81 MG tablet   Dose:  162 mg        Take 162 mg by mouth every evening   Refills:  0        Garlic 500 MG Tabs        Refills:  0        VITAMIN C PO   Dose:  1000 mg        Take 1,000 mg by mouth daily sometimes   Refills:  0        Vitamin D-3 5000 UNITS Tabs   Dose:  1 tablet        Take 1 tablet by mouth daily.   Refills:  0                Procedures and tests performed during your visit     CBC with platelets differential    CT Chest & Pulmonary Embol Angio w Con    CT Head Neck Angio w/o & w Contrast    Comprehensive metabolic panel    EKG 12 lead    Head CT w/o contrast    Troponin I    UA reflex to Microscopic and Culture      Orders Needing Specimen Collection     None      Pending Results     Date and Time Order Name Status Description    7/1/2017 0915 CT Chest & Pulmonary Embol Angio w Con Preliminary             Pending Culture Results     No orders found from 6/29/2017 to 7/2/2017.            Pending Results Instructions     If you had any lab results that were not finalized at the time of your Discharge, you can call the ED Lab Result RN at [phone]. You will be contacted by this team for any positive Lab results or changes in treatment. The nurses are available 7 days a week from 10A to 6:30P.  You can leave a message 24 hours per day and they will return your call.        Test Results From Your Hospital Stay        7/1/2017  9:53 AM      Component Results     Component Value Ref Range & Units Status    WBC 7.0 4.0 - 11.0 10e9/L Final    RBC Count 4.80 3.8 - 5.2 10e12/L Final    Hemoglobin 15.0 11.7 - 15.7 g/dL Final    Hematocrit 44.6 35.0 - 47.0 % Final    MCV 93 78 - 100 fl Final    MCH 31.3 26.5 - 33.0 pg Final    MCHC 33.6 31.5 - 36.5 g/dL Final    RDW 14.3 10.0 - 15.0 % Final    Platelet Count 236 150 - 450 10e9/L Final    Diff Method Automated Method  Final    % Neutrophils 45.0 % Final    % Lymphocytes 38.9 %  Final    % Monocytes 9.9 % Final    % Eosinophils 5.5 % Final    % Basophils 0.6 % Final    % Immature Granulocytes 0.1 % Final    Absolute Neutrophil 3.1 1.6 - 8.3 10e9/L Final    Absolute Lymphocytes 2.7 0.8 - 5.3 10e9/L Final    Absolute Monocytes 0.7 0.0 - 1.3 10e9/L Final    Absolute Eosinophils 0.4 0.0 - 0.7 10e9/L Final    Absolute Basophils 0.0 0.0 - 0.2 10e9/L Final    Abs Immature Granulocytes 0.0 0 - 0.4 10e9/L Final         7/1/2017 10:11 AM      Component Results     Component Value Ref Range & Units Status    Sodium 140 133 - 144 mmol/L Final    Potassium 4.0 3.4 - 5.3 mmol/L Final    Chloride 107 94 - 109 mmol/L Final    Carbon Dioxide 26 20 - 32 mmol/L Final    Anion Gap 7 3 - 14 mmol/L Final    Glucose 107 (H) 70 - 99 mg/dL Final    Urea Nitrogen 18 7 - 30 mg/dL Final    Creatinine 0.80 0.52 - 1.04 mg/dL Final    GFR Estimate 70 >60 mL/min/1.7m2 Final    Non  GFR Calc    GFR Estimate If Black 85 >60 mL/min/1.7m2 Final    African American GFR Calc    Calcium 9.5 8.5 - 10.1 mg/dL Final    Bilirubin Total 0.3 0.2 - 1.3 mg/dL Final    Albumin 3.4 3.4 - 5.0 g/dL Final    Protein Total 7.5 6.8 - 8.8 g/dL Final    Alkaline Phosphatase 69 40 - 150 U/L Final    ALT 20 0 - 50 U/L Final    AST 28 0 - 45 U/L Final         7/1/2017 10:11 AM      Component Results     Component Value Ref Range & Units Status    Troponin I ES  0.000 - 0.045 ug/L Final    <0.015  The 99th percentile for upper reference range is 0.045 ug/L.  Troponin values in   the range of 0.045 - 0.120 ug/L may be associated with risks of adverse   clinical events.           7/1/2017  1:14 PM      Narrative     CTA ANGIOGRAM HEAD NECK  7/1/2017 11:48 AM     HISTORY:  Atraumatic neck pain and discomfort. Hypertension. Rule out  dissection.    TECHNIQUE: Axial images were obtained through the head and neck  without and with intravenous contrast. 70 mL of Isovue-370 was given.  Multiplanar reconstructions were performed. 3-D  reconstructions off a  remote workstation for CT angiography were also acquired. Carotid  stenoses were evaluated by comparing the caliber of the proximal  internal carotid artery to the caliber of the distal internal carotid  artery. Radiation dose for this scan was reduced using automated  exposure control, adjustment of the mA and/or kV according to patient  size, or iterative reconstruction technique.    FINDINGS:  Brachiocephalic vessels: There is moderate thickening and  atherosclerotic disease of the proximal left subclavian artery, but  there is no stenosis and no evidence for dissection. There is also  some mild atherosclerotic disease of the right subclavian artery  without stenosis.    Right carotid system: Mild plaque. No evidence for stenosis or  dissection.    Left carotid system: Mild plaque. No evidence for stenosis or  dissection.    Right vertebral artery: Minimal calcified plaque is seen at the  origin. No stenosis or dissection.    Left vertebral artery: Normal.    [place]: There is moderate atherosclerotic disease involving  the carotid siphon regions bilaterally with approximately 50% stenosis  of the left cavernous carotid. The basilar artery is widely patent.  The proximal anterior, middle, and posterior cerebral arteries are  patent.    Other findings: None.        Impression     IMPRESSION:  1. Atherosclerotic disease involving both carotid bifurcations, the  carotid siphon regions, the subclavian arteries, and the right  vertebral artery without significant stenosis.  2. No evidence for dissection, thromboembolism, significant stenosis,  or aneurysm.    WINNIE AVERY MD               7/1/2017 11:22 AM      Component Results     Component Value Ref Range & Units Status    Color Urine Straw  Final    Appearance Urine Clear  Final    Glucose Urine Negative NEG mg/dL Final    Bilirubin Urine Negative NEG Final    Ketones Urine Negative NEG mg/dL Final    Specific Gravity Urine 1.007  1.003 - 1.035 Final    Blood Urine Negative NEG Final    pH Urine 7.5 (H) 5.0 - 7.0 pH Final    Protein Albumin Urine Negative NEG mg/dL Final    Urobilinogen mg/dL Normal 0.0 - 2.0 mg/dL Final    Nitrite Urine Negative NEG Final    Leukocyte Esterase Urine Small (A) NEG Final    Source Midstream Urine  Final    RBC Urine 0 0 - 2 /HPF Final    WBC Urine 2 0 - 2 /HPF Final    Squamous Epithelial /HPF Urine <1 0 - 1 /HPF Final         7/1/2017 11:59 AM      Narrative     CT HEAD W/O CONTRAST 7/1/2017 11:48 AM     HISTORY:  neck pain. eval for bleed vs mass lesion vs other    TECHNIQUE:  Axial images of the head without IV contrast material.  Radiation dose for this scan was reduced using automated exposure  control, adjustment of the mA and/or kV according to patient size, or  iterative reconstruction technique.    COMPARISON: None.    FINDINGS: No intracranial hemorrhage, mass lesion, or acute infarct is  seen. No skull fracture.   There is some bilateral white matter low-density, likely related to  small vessel ischemic disease.        Impression     IMPRESSION:  No acute intracranial pathology.    IQRA SOLER MD         7/1/2017 12:02 PM      Narrative     CTA ANGIOGRAM CHEST AND PULMONARY EMBOLISM  7/1/2017 11:50 AM    HISTORY:  Chest pain and back pain.    TECHNIQUE: Scans obtained from the apices through the diaphragm with  IV contrast. 67 mL Isovue 370 injected. Radiation dose for this scan  was reduced using automated exposure control, adjustment of the mA  and/or kV according to patient size, or iterative reconstruction  technique.    COMPARISON:  Chest x-ray 4/20/2012.    FINDINGS:  No acute thoracic aortic abnormality. Thoracic aortic and  coronary artery calcifications. No evidence for pulmonary embolism. No  lobar consolidation identified. There are a few scattered lymph nodes  seen in the chest. Right hilar region mildly enlarged lymph node is  1.8 cm, series 4 image 63. Granuloma medial right lung base,  "series 5  image 112. There is subpleural atelectasis noted bilaterally. Very  mild patchy groundglass opacities bilaterally at the upper lungs.  Upper abdomen images do not show any acute abnormalities. A few tiny  gallstones.        Impression     IMPRESSION:  1. No pulmonary embolism, or acute thoracic aortic abnormality.  2. Minimal patchy groundglass opacities at the bilateral upper lungs  could represent very mild edema versus an infectious or inflammatory  cause.  3. Nonspecific mildly enlarged right hilar lymph nodes.  4. A few small gallstones.                Thank you for choosing [facility]       Thank you for choosing [facility] for your care. Our goal is always to provide you with excellent care. Hearing back from our patients is one way we can continue to improve our services. Please take a few minutes to complete the written survey that you may receive in the mail after you visit with us. Thank you!        [employer] Information     [employer] lets you send messages to your doctor, view your test results, renew your prescriptions, schedule appointments and more. To sign up, go to [email or web address] . Click on \"Log in\" on the left side of the screen, which will take you to the Welcome page. Then click on \"Sign up Now\" on the right side of the page.     You will be asked to enter the access code listed below, as well as some personal information. Please follow the directions to create your username and password.     Your access code is: [email or web address]  Expires: 2017 10:07 PM     Your access code will  in 90 days. If you need help or a new code, please call your [facility] clinic or [phone].        Care EveryWhere ID     This is your Care EveryWhere ID. This could be used by other organizations to access your [facility] medical records  [id number]        Equal Access to Services     TERESA SHELTON AH: Nicho Wright, tonya dodd, micheline hackett " marysol murray ah. So [facility] [phone].    ATENCIÓN: Si habla español, tiene a sebastian disposición servicios gratuitos de asistencia lingüística. Llame al [phone].    We comply with applicable federal civil rights laws and [place] laws. We do not discriminate on the basis of race, color, national origin, age, disability sex, sexual orientation or gender identity.            After Visit Summary       This is your record. Keep this with you and show to your community pharmacist(s) and doctor(s) at your next visit.

## 2017-07-01 NOTE — ED PROVIDER NOTES
History     Chief Complaint   Patient presents with     Neck Pain     HPI  Catherine Salgado is a 77 year old female who has a history of cerebral infarction, hypertension, hyperlipidemia, and hypothyroidism who presents to the ED for evaluation of neck pain. Patient was seen in the ED on 06/08/2017 by Dr. Zhou for the same pain that originated in the right shoulder and radiated up the side of the head, only on the right side. Patient was discharged home with the relief of Aspercreme. Today, patient reports that she was woken up from sleep at 730 AM with severe pain in the left shoulder that radiates into the face. She applied Aspercreme to the right shoulder and right neck which did not alleviate her pain. She then applied an ice pack to the area which made her pain worse. After this, her pain then traveled from the right side to the left side. Her pain is now only on the left side in the shoulder, neck, and face. She reports that her pain is worse. She notes that she does have some numbness in the right hand, worse with the blood pressure cuff. She takes 2 81 mg aspirin at night and garlic in the morning. She smokes 1 pack of cigarettes per day which is more than her usual. Patient had a stroke in October of 2015.      Social History:  She lives in [place], [place]. She is here in the ED via private car with .    Past Medical History:  Past Medical History:   Diagnosis Date     Bunion     Bilateral     Chest pain, unspecified      Generalized osteoarthrosis, unspecified site     Cervical     Macular degeneration (senile) of retina, unspecified      Other and unspecified hyperlipidemia      Phlebitis and thrombophlebitis of other deep vessels of lower extremities     DVT on OCP     TOBACCO ABUSE-CONTINUOUS      Unspecified essential hypertension        Medications:  Current Outpatient Prescriptions   Medication Sig Dispense Refill     Garlic 500 MG TABS        aspirin 81 MG tablet Take 162 mg by mouth every  evening        Cholecalciferol (VITAMIN D-3) 5000 UNIT TABS Take 1 tablet by mouth daily.       Ascorbic Acid (VITAMIN C PO) Take 1,000 mg by mouth daily sometimes         Allergies:  Allergies   Allergen Reactions     Wasps [Hornets] Anaphylaxis     Passes out     Gadavist [Gadobutrol] Other (See Comments)     Did ok if benadryl is given.      Ivp Dye [Contrast Dye] Other (See Comments)     Severe arthritic reaction     Penicillins Hives       I have reviewed the Medications, Allergies, Past Medical and Surgical History, and Social History in the Epic system.    Review of Systems   Constitutional: Negative.    HENT: Negative.    Eyes: Negative.    Respiratory: Negative.    Cardiovascular: Negative.    Gastrointestinal: Negative.    Endocrine: Negative.    Genitourinary: Negative.    Musculoskeletal: Positive for back pain and neck pain.   Skin: Negative.    Allergic/Immunologic: Negative.    Neurological: Negative.    Hematological: Negative.    Psychiatric/Behavioral: Negative.        Physical Exam      Physical Exam   Constitutional: She is oriented to person, place, and time. She appears well-developed. She appears distressed.   HENT:   Head: Normocephalic and atraumatic.   Eyes: Conjunctivae and EOM are normal. Pupils are equal, round, and reactive to light. Right eye exhibits no discharge. Left eye exhibits no discharge. No scleral icterus.   Neck: Normal range of motion. Neck supple. No JVD present. No tracheal deviation present. No thyromegaly present.   Cardiovascular: Normal rate.  Exam reveals no gallop and no friction rub.    Pulmonary/Chest: Effort normal. No stridor.   Lymphadenopathy:     She has no cervical adenopathy.   Neurological: She is alert and oriented to person, place, and time.   Skin: No rash noted. No erythema. No pallor.   Psychiatric: She has a normal mood and affect. Her behavior is normal. Judgment and thought content normal.       ED Course     ED Course     Procedures              EKG Interpretation:      Interpreted by Landry Gann  Time reviewed: 9:30AM  Symptoms at time of EKG: neck pain   Rhythm: normal sinus   Rate: Normal  Axis: Normal  Ectopy: none  Conduction: normal  ST Segments/ T Waves: Non-specific ST-T wave changes  Q Waves: nonspecific  Comparison to prior: Unchanged from 10/31/15    Clinical Impression: no acute changes        Critical Care time:  none                 ED Medications:  Medications   HYDROmorphone (DILAUDID) injection 0.2 mg (0.2 mg Intravenous Given 7/1/17 0932)   ondansetron (ZOFRAN) injection 4 mg (4 mg Intravenous Given 7/1/17 0925)   0.9% sodium chloride BOLUS (0 mLs Intravenous Stopped 7/1/17 1225)   methylPREDNISolone sodium succinate (solu-MEDROL) injection 125 mg (125 mg Intravenous Given 7/1/17 0927)   iopamidol (ISOVUE-370) solution 67 mL (67 mLs Intravenous Given 7/1/17 1144)   sodium chloride 0.9 % for CT scan flush dose 98 mL (98 mLs Intravenous Given 7/1/17 1142)   iopamidol (ISOVUE-370) solution 70 mL (70 mLs Intravenous Given 7/1/17 1143)   sodium chloride 0.9 % for CT scan flush dose 100 mL (1,000 mLs Intravenous Given 7/1/17 1143)       ED Vitals:  Vitals:    07/01/17 0930 07/01/17 1000 07/01/17 1030 07/01/17 1200   BP: (!) 203/98 (!) 182/101 (!) 167/93 (!) 185/106   Pulse:       Resp: 14 11 9 10   Temp:       TempSrc:       SpO2: 96% 95% 91% 93%     Vitals:    07/01/17 0930 07/01/17 1000 07/01/17 1030 07/01/17 1200   BP: (!) 203/98 (!) 182/101 (!) 167/93 (!) 185/106   Pulse:       Resp: 14 11 9 10   Temp:       TempSrc:       SpO2: 96% 95% 91% 93%     Vitals:    07/01/17 1030 07/01/17 1200 07/01/17 1230 07/01/17 1300   BP: (!) 167/93 (!) 185/106 (!) 178/98 (!) 159/96   Pulse:       Resp: 9 10     Temp:       TempSrc:       SpO2: 91% 93% 94% (!) 89%     ED Labs and Imaging:  Results for orders placed or performed during the hospital encounter of 07/01/17 (from the past 24 hour(s))   CBC with platelets differential   Result Value Ref  Range    WBC 7.0 4.0 - 11.0 10e9/L    RBC Count 4.80 3.8 - 5.2 10e12/L    Hemoglobin 15.0 11.7 - 15.7 g/dL    Hematocrit 44.6 35.0 - 47.0 %    MCV 93 78 - 100 fl    MCH 31.3 26.5 - 33.0 pg    MCHC 33.6 31.5 - 36.5 g/dL    RDW 14.3 10.0 - 15.0 %    Platelet Count 236 150 - 450 10e9/L    Diff Method Automated Method     % Neutrophils 45.0 %    % Lymphocytes 38.9 %    % Monocytes 9.9 %    % Eosinophils 5.5 %    % Basophils 0.6 %    % Immature Granulocytes 0.1 %    Absolute Neutrophil 3.1 1.6 - 8.3 10e9/L    Absolute Lymphocytes 2.7 0.8 - 5.3 10e9/L    Absolute Monocytes 0.7 0.0 - 1.3 10e9/L    Absolute Eosinophils 0.4 0.0 - 0.7 10e9/L    Absolute Basophils 0.0 0.0 - 0.2 10e9/L    Abs Immature Granulocytes 0.0 0 - 0.4 10e9/L   Comprehensive metabolic panel   Result Value Ref Range    Sodium 140 133 - 144 mmol/L    Potassium 4.0 3.4 - 5.3 mmol/L    Chloride 107 94 - 109 mmol/L    Carbon Dioxide 26 20 - 32 mmol/L    Anion Gap 7 3 - 14 mmol/L    Glucose 107 (H) 70 - 99 mg/dL    Urea Nitrogen 18 7 - 30 mg/dL    Creatinine 0.80 0.52 - 1.04 mg/dL    GFR Estimate 70 >60 mL/min/1.7m2    GFR Estimate If Black 85 >60 mL/min/1.7m2    Calcium 9.5 8.5 - 10.1 mg/dL    Bilirubin Total 0.3 0.2 - 1.3 mg/dL    Albumin 3.4 3.4 - 5.0 g/dL    Protein Total 7.5 6.8 - 8.8 g/dL    Alkaline Phosphatase 69 40 - 150 U/L    ALT 20 0 - 50 U/L    AST 28 0 - 45 U/L   Troponin I   Result Value Ref Range    Troponin I ES  0.000 - 0.045 ug/L     <0.015  The 99th percentile for upper reference range is 0.045 ug/L.  Troponin values in   the range of 0.045 - 0.120 ug/L may be associated with risks of adverse   clinical events.     UA reflex to Microscopic and Culture   Result Value Ref Range    Color Urine Straw     Appearance Urine Clear     Glucose Urine Negative NEG mg/dL    Bilirubin Urine Negative NEG    Ketones Urine Negative NEG mg/dL    Specific Gravity Urine 1.007 1.003 - 1.035    Blood Urine Negative NEG    pH Urine 7.5 (H) 5.0 - 7.0 pH     Protein Albumin Urine Negative NEG mg/dL    Urobilinogen mg/dL Normal 0.0 - 2.0 mg/dL    Nitrite Urine Negative NEG    Leukocyte Esterase Urine Small (A) NEG    Source Midstream Urine     RBC Urine 0 0 - 2 /HPF    WBC Urine 2 0 - 2 /HPF    Squamous Epithelial /HPF Urine <1 0 - 1 /HPF   Head CT w/o contrast    Narrative    CT HEAD W/O CONTRAST 7/1/2017 11:48 AM     HISTORY:  neck pain. eval for bleed vs mass lesion vs other    TECHNIQUE:  Axial images of the head without IV contrast material.  Radiation dose for this scan was reduced using automated exposure  control, adjustment of the mA and/or kV according to patient size, or  iterative reconstruction technique.    COMPARISON: None.    FINDINGS: No intracranial hemorrhage, mass lesion, or acute infarct is  seen. No skull fracture.   There is some bilateral white matter low-density, likely related to  small vessel ischemic disease.      Impression    IMPRESSION:  No acute intracranial pathology.    IQRA SOLER MD   CT Head Neck Angio w/o & w Contrast    Narrative    CTA ANGIOGRAM HEAD NECK  7/1/2017 11:48 AM     HISTORY:  Atraumatic neck pain and discomfort. Hypertension. Rule out  dissection.    TECHNIQUE: Axial images were obtained through the head and neck  without and with intravenous contrast. 70 mL of Isovue-370 was given.  Multiplanar reconstructions were performed. 3-D reconstructions off a  remote workstation for CT angiography were also acquired. Carotid  stenoses were evaluated by comparing the caliber of the proximal  internal carotid artery to the caliber of the distal internal carotid  artery. Radiation dose for this scan was reduced using automated  exposure control, adjustment of the mA and/or kV according to patient  size, or iterative reconstruction technique.    FINDINGS:  Brachiocephalic vessels: There is moderate thickening and  atherosclerotic disease of the proximal left subclavian artery, but  there is no stenosis and no evidence for  dissection. There is also  some mild atherosclerotic disease of the right subclavian artery  without stenosis.    Right carotid system: Mild plaque. No evidence for stenosis or  dissection.    Left carotid system: Mild plaque. No evidence for stenosis or  dissection.    Right vertebral artery: Minimal calcified plaque is seen at the  origin. No stenosis or dissection.    Left vertebral artery: Normal.    [place]: There is moderate atherosclerotic disease involving  the carotid siphon regions bilaterally with approximately 50% stenosis  of the left cavernous carotid. The basilar artery is widely patent.  The proximal anterior, middle, and posterior cerebral arteries are  patent.    Other findings: None.      Impression    IMPRESSION:  1. Atherosclerotic disease involving both carotid bifurcations, the  carotid siphon regions, the subclavian arteries, and the right  vertebral artery without significant stenosis.  2. No evidence for dissection, thromboembolism, significant stenosis,  or aneurysm.    WINNIE AVERY MD   CT Chest & Pulmonary Embol Angio w Con    Narrative    CTA ANGIOGRAM CHEST AND PULMONARY EMBOLISM  7/1/2017 11:50 AM    HISTORY:  Chest pain and back pain.    TECHNIQUE: Scans obtained from the apices through the diaphragm with  IV contrast. 67 mL Isovue 370 injected. Radiation dose for this scan  was reduced using automated exposure control, adjustment of the mA  and/or kV according to patient size, or iterative reconstruction  technique.    COMPARISON:  Chest x-ray 4/20/2012.    FINDINGS:  No acute thoracic aortic abnormality. Thoracic aortic and  coronary artery calcifications. No evidence for pulmonary embolism. No  lobar consolidation identified. There are a few scattered lymph nodes  seen in the chest. Right hilar region mildly enlarged lymph node is  1.8 cm, series 4 image 63. Granuloma medial right lung base, series 5  image 112. There is subpleural atelectasis noted bilaterally.  Very  mild patchy groundglass opacities bilaterally at the upper lungs.  Upper abdomen images do not show any acute abnormalities. A few tiny  gallstones.      Impression    IMPRESSION:  1. No pulmonary embolism, or acute thoracic aortic abnormality.  2. Minimal patchy groundglass opacities at the bilateral upper lungs  could represent very mild edema versus an infectious or inflammatory  cause.  3. Nonspecific mildly enlarged right hilar lymph nodes.  4. A few small gallstones.    GOLDIE ANGLIN MD     8:58 AM Patient assessed.     Assessments & Plan (with Medical Decision Making)   Clinical Impression: Pleasant 77-year-old female who presented for sudden onset of atraumatic bilateral neck pain and discomfort.  She was seen in the emergency department on June 8 for similar complaint of right scapular pain that was atraumatic.  She did use some Aspercreme Which seemed to resolve her pain and discomfort at that time.  She reports prior to ED arrival she awoke her from sleep at about 7:30 this morning she report about 90 minutes prior to examination evaluation in the ED.  Patient tells me she has been smoking more lately up to 1 pack per day.  She takes no other active medications.  She has had episodes of elevated blood pressure readings when she has had this type of pain and discomfort.  She does not take any medications for blood pressure.  On my exam she is complaining of pain and discomfort in her neck with arm numbness but reports no chest pain or pressure no shortness of breath and no palpitations.  She was hypertensive in triage with a blood pressure 204/118.  She has a history of stroke October 2015.  She has no speech difficulty, she reports some tingling and numbness in her arm. GCS 15. Normal extremity and neck exam.      ED Course and Plan:   I reviewed her ED visit and evaluation from June 8.  At that time there was no evaluation or assessment completed as patient was asymptomatic after discussing risks and  "benefit of work-up patient and family elected to not pursue a workup.  Because she is a smoker with high concern for untreated hypertension labs including troponin, CT scan angiogram of the neck and chest was obtained to exclude any dissection.  She tells me she is allergic to IV contrast dye causes a \"severe arthritic reaction\".  She has no history of rash, swelling, throat tightness ,shortness of breath or wheezing with IV contrast and did have IV contrast as part of her work-up for stroke in 10/2015.    She was given IV Solu-Medrol to help reduce the risk of her getting a severe arthritic reaction post IV contrast..  She was offered IV Dilaudid for neck pain and discomfort which she declined.  EKG on arrival in the ED today shows   no acute ischemia from EKG dated 10/31/2015.  There are nonspecific T-wave changes in aVL which is old otherwise no acute abnormality appreciated. She has normal lab work included normal troponin.  Her blood pressure did improve somewhat without pain medication in the ED. CT scan of the chest today shows no acute process.  There is a few gallstones noted incidentally.  CT angiogram of the neck did not show any acute process per radiology (Dr SARAH Coburn). See radiology interpretation in detail reported above.  She continued decline any interventions medications for her neck pain and scapular pain and discomfort.  Her blood pressure improved at the time of discharge blood pressure was 159/95.    Patient did tell me that she had tried 3 different blood pressure medications in the past causing cough including Cozaar, lisinopril and triamterene hydrochlorothiazide. A clinic appointment was made with Dr RIMMA Gaitan for follow-up about her neck pain and elevated blood pressure.          Disclaimer: This note consists of symbols derived from keyboarding, dictation and/or voice recognition software. As a result, there may be errors in the script that have gone undetected. Please consider this when " interpreting information found in this chart.      I have reviewed the nursing notes.    I have reviewed the findings, diagnosis, plan and need for follow up with the patient.       New Prescriptions    No medications on file       Final diagnoses:   Neck pain   Elevated blood pressure reading without diagnosis of hypertension     This document serves as a record of the services and decisions personally performed and made by Landry Gann, *. The HPI was created on his behalf by   Diana Pascual, a trained medical scribe. The creation of this document is based the provider's statements to the medical scribe.  Diana Pascual 8:58 AM 7/1/2017    Provider:   The information in this document, created by the medical scribe for me, accurately reflects the services I personally performed and the decisions made by me. I have reviewed and approved this document for accuracy prior to leaving the patient care area.  Landry Gann, * 8:58 AM 7/1/2017 7/1/2017   [facility] EMERGENCY DEPARTMENT     Landry Gann MD  07/01/17 0323

## 2017-07-01 NOTE — ED NOTES
Pt states she is feeling a lot better, she doesn't know why she is feeling better but glad she is, she states at home she can usually walk it off but doesn't know what is causing this pain. SO remains at bedside. Monitor

## 2017-07-01 NOTE — ED NOTES
Pt is very reluctant to take any medications, talked with her in great length that it's up to her if she wants any medications and that I wouldn't give her anything she doesn't want. I also talked with pt that she came here for help and this is what we can do to help her. She is very worried that she is going to have a reaction to any medications. I gave 0.1 dilaudid to start.

## 2017-07-01 NOTE — ED NOTES
Started having neck/head pain after getting up this am 0730, states was in 5-8 wks go with same thing. She has not followed up with her PCP. Today she states it starts in neck and goes up into her head, she took one Tylenol this am along with asper cream, with not a lot of help. She states now while sitting up pain goes away, seems to be positional. Pt is a/o x 4,  at bedside. Pt is eating and drinking normal. Ice has made this pain worse today. Has HX of CVA 10/31/2015

## 2017-07-01 NOTE — ED NOTES
Pt back from CT, she was up to BR and tolerated activity well. She is still having pain with nausea, I offered pain meds and antinausea meds, theses where both refused. She stated she thinks if she had something to eat she would feel better, told her I could talk with MD about this and offered gram crackers, she has also refused this.  at bedside. Monitor

## 2017-07-01 NOTE — ED NOTES
Pt tolerated dilaudid well, 0.2mg given to complete order. Warm blanket given. Pt is a/o x 4,  at bedside.

## 2017-07-01 NOTE — ED AVS SNAPSHOT
[facility] Emergency Department    [address] [address]    Phone:  [phone]    Fax:  [phone]                                       Catherine Salgado   MRN: [id number]    Department:  [facility] Emergency Department   Date of Visit:  7/1/2017           After Visit Summary Signature Page     I have received my discharge instructions, and my questions have been answered. I have discussed any challenges I see with this plan with the nurse or doctor.    ..........................................................................................................................................  Patient/Patient Representative Signature      ..........................................................................................................................................  Patient Representative Print Name and Relationship to Patient    ..................................................               ................................................  Date                                            Time    ..........................................................................................................................................  Reviewed by Signature/Title    ...................................................              ..............................................  Date                                                            Time

## 2017-07-01 NOTE — ED NOTES
Pt states still having pain, more in ear now, refuses anything for pain control. Rates pain 8/10, she remains a/o x 4.  at bedside

## 2017-07-01 NOTE — ED NOTES
Pt up to BR wit SBA, pt tolerated fair, still has pain in rt ear and neck, refusing pain meds at this time, talked with her to let me know if and when she would like something for pain. SO at bedside monitor

## 2017-07-05 ENCOUNTER — OFFICE VISIT (OUTPATIENT)
Dept: FAMILY MEDICINE | Facility: CLINIC | Age: 77
End: 2017-07-05
Payer: MEDICARE

## 2017-07-05 VITALS
WEIGHT: 159 LBS | SYSTOLIC BLOOD PRESSURE: 144 MMHG | HEART RATE: 72 BPM | DIASTOLIC BLOOD PRESSURE: 80 MMHG | BODY MASS INDEX: 27.29 KG/M2

## 2017-07-05 DIAGNOSIS — I10 HYPERTENSION GOAL BP (BLOOD PRESSURE) < 140/90: Primary | ICD-10-CM

## 2017-07-05 PROCEDURE — 99214 OFFICE O/P EST MOD 30 MIN: CPT | Performed by: FAMILY MEDICINE

## 2017-07-05 RX ORDER — PYRIDOXINE HCL (VITAMIN B6) 100 MG
TABLET ORAL
COMMUNITY
End: 2021-05-20

## 2017-07-05 RX ORDER — CYANOCOBALAMIN (VITAMIN B-12) 1000 MCG
TABLET, EXTENDED RELEASE ORAL DAILY
COMMUNITY
End: 2024-08-07

## 2017-07-05 RX ORDER — METOPROLOL SUCCINATE 25 MG/1
25 TABLET, EXTENDED RELEASE ORAL DAILY
Qty: 30 TABLET | Refills: 1 | Status: SHIPPED | OUTPATIENT
Start: 2017-07-05 | End: 2018-08-21

## 2017-07-05 NOTE — PROGRESS NOTES
SUBJECTIVE:                                                    Catherine Salgado is a 77 year old female who presents to clinic today for the following health issues:    Chief Complaint   Patient presents with     ER F/U     Pt. was seen in ER on 7/1/17 for neck pain and elevated BP.         Hypertension Follow-up      Outpatient blood pressures are being checked at home.  Results are 130-140/80-90's.    Low Salt Diet: no added salt      Amount of exercise or physical activity: 2-3 days/week for an average of 45-60 minutes    Problems taking medications regularly: No    Medication side effects: none    Diet: regular (no restrictions)          Problem list and histories reviewed & adjusted, as indicated.  Additional history:         Reviewed and updated as needed this visit by clinical staff  Tobacco  Allergies  Med Hx  Surg Hx  Fam Hx  Soc Hx      Reviewed and updated as needed this visit by Provider      Further history obtained, clarified or corrected by physician:  She has uncontrolled BP even on home readings    OBJECTIVE:  LUNGS: clear to auscultation, normal breath sounds  CV: RRR without murmur  ABD: BS+, soft, nontender, no masses, no hepatosplenomegaly  EXTREMITIES: without joint tenderness, swelling or erythema.  No muscle tenderness or abnormality.  SKIN: No rashes or abnormalities  NEURO:non focal exam    ASSESSMENT:  Hypertension goal BP (blood pressure) < 140/90    PLAN:  Start metoprolol  Recheck in 4 weeks    Orders Placed This Encounter     Selenium 200 MCG CAPS     Coenzyme Q10 (COQ-10) 100 MG CAPS     Ferrous Fumarate (IRON) 18 MG TBCR     vitamin B complex with vitamin C (VITAMIN  B COMPLEX) TABS tablet     metoprolol (TOPROL-XL) 25 MG 24 hr tablet

## 2017-07-05 NOTE — MR AVS SNAPSHOT
"              After Visit Summary   7/5/2017    Catherine Salgado    MRN: [id number]           Patient Information     Date Of Birth          1940        Visit Information        Provider Department      7/5/2017 2:00 PM Hi Gaitan MD [facility]        Today's Diagnoses     Hypertension goal BP (blood pressure) < 140/90    -  1      Care Instructions    Thank you for choosing [facility].  You may be receiving a survey in the mail from [facility] your [facility].  Please take a few minutes to complete and return the survey to let us know how we are doing.  Our Clinic hours are:  Mondays    7:20 am - 7 pm  Tues -  Fri  7:20 am - 5 pm  Clinic Phone: [phone]  The clinic lab opens at 7:30 am Mon - Fri and appointments are required.  [facility]  Ph. [phone]  Monday-Thursday 8 am - 7pm  Tues/Wed/Fri 8 am - 5:30 pm            Follow-ups after your visit        Who to contact     If you have questions or need follow up information about today's clinic visit or your schedule please contact [facility] directly at [phone].  Normal or non-critical lab and imaging results will be communicated to you by MyChart, letter or phone within 4 business days after the clinic has received the results. If you do not hear from us within 7 days, please contact the clinic through MyChart or phone. If you have a critical or abnormal lab result, we will notify you by phone as soon as possible.  Submit refill requests through [employer] or call your pharmacy and they will forward the refill request to us. Please allow 3 business days for your refill to be completed.          Additional Information About Your Visit        FOBOhart Information     [employer] lets you send messages to your doctor, view your test results, renew your prescriptions, schedule appointments and more. To sign up, go to [email or web address] . Click on \"Log in\" on the left side " "of the screen, which will take you to the Welcome page. Then click on \"Sign up Now\" on the right side of the page.     You will be asked to enter the access code listed below, as well as some personal information. Please follow the directions to create your username and password.     Your access code is: [email or web address]  Expires: 2017 10:07 PM     Your access code will  in 90 days. If you need help or a new code, please call your [facility] clinic or [phone].        Care EveryWhere ID     This is your Care EveryWhere ID. This could be used by other organizations to access your [facility] medical records  [id number]        Your Vitals Were     Pulse BMI (Body Mass Index)                72 27.29 kg/m2           Blood Pressure from Last 3 Encounters:   17 144/80   17 (!) 159/96   17 139/87    Weight from Last 3 Encounters:   17 159 lb (72.1 kg)   17 160 lb (72.6 kg)   17 160 lb (72.6 kg)              Today, you had the following     No orders found for display         Today's Medication Changes          These changes are accurate as of: 17  2:46 PM.  If you have any questions, ask your nurse or doctor.               Start taking these medicines.        Dose/Directions    metoprolol 25 MG 24 hr tablet   Commonly known as:  TOPROL-XL   Used for:  Hypertension goal BP (blood pressure) < 140/90   Started by:  Hi Gaitan MD        Dose:  25 mg   Take 1 tablet (25 mg) by mouth daily   Quantity:  30 tablet   Refills:  1            Where to get your medicines      These medications were sent to [facility] [address] [place] [address]     Phone:  [phone]     metoprolol 25 MG 24 hr tablet                Primary Care Provider Office Phone # Fax #    Trip Correia PA-C [phone]       [facility] [address] [address]        Equal Access to Services     TERESA SHELTON AH: " Hadii toma ag Sogeovannyali, waaxda luqadaha, qaybta kaqueta nicole, micheline duranin hayaayuli rochajaki gregg laabramyuli duc. So [facility] [phone].    ATENCIÓN: Si ramiro bentley, tiene a sebastian disposición servicios gratuitos de asistencia lingüística. Llame al [phone].    We comply with applicable federal civil rights laws and [place] laws. We do not discriminate on the basis of race, color, national origin, age, disability sex, sexual orientation or gender identity.            Thank you!     Thank you for choosing [facility]  for your care. Our goal is always to provide you with excellent care. Hearing back from our patients is one way we can continue to improve our services. Please take a few minutes to complete the written survey that you may receive in the mail after your visit with us. Thank you!             Your Updated Medication List - Protect others around you: Learn how to safely use, store and throw away your medicines at www.disposemymeds.org.          This list is accurate as of: 7/5/17  2:46 PM.  Always use your most recent med list.                   Brand Name Dispense Instructions for use Diagnosis    aspirin 81 MG tablet      Take 162 mg by mouth every evening        CoQ-10 100 MG Caps      Take by mouth daily        Garlic 500 MG Tabs           Iron 18 MG Tbcr      Take by mouth three times a week        metoprolol 25 MG 24 hr tablet    TOPROL-XL    30 tablet    Take 1 tablet (25 mg) by mouth daily    Hypertension goal BP (blood pressure) < 140/90       Selenium 200 MCG Caps      Take by mouth daily        vitamin B complex with vitamin C Tabs tablet      Take 1 tablet by mouth daily        VITAMIN C PO      Take 1,000 mg by mouth daily sometimes        Vitamin D-3 5000 UNITS Tabs      Take 1 tablet by mouth daily.

## 2017-07-05 NOTE — NURSING NOTE
"Chief Complaint   Patient presents with     ER F/U     Pt. was seen in ER on 7/1/17 for neck pain and elevated BP.       Initial /80 (BP Location: Right arm, Patient Position: Chair, Cuff Size: Adult Regular)  Pulse 72  Wt 159 lb (72.1 kg)  BMI 27.29 kg/m2 Estimated body mass index is 27.29 kg/(m^2) as calculated from the following:    Height as of 2/24/17: 5' 4\" (1.626 m).    Weight as of this encounter: 159 lb (72.1 kg).  Medication Reconciliation: complete    "

## 2017-07-05 NOTE — PATIENT INSTRUCTIONS
Thank you for choosing [facility].  You may be receiving a survey in the mail from [facility] regarding your visit today.  Please take a few minutes to complete and return the survey to let us know how we are doing.  Our Clinic hours are:  Mondays    7:20 am - 7 pm  Tues -  Fri  7:20 am - 5 pm  Clinic Phone: [phone]  The clinic lab opens at 7:30 am Mon - Fri and appointments are required.  [facility]. [phone]  Monday-Thursday 8 am - 7pm  Tues/Wed/Fri 8 am - 5:30 pm

## 2017-12-15 ENCOUNTER — OFFICE VISIT (OUTPATIENT)
Dept: FAMILY MEDICINE | Facility: CLINIC | Age: 77
End: 2017-12-15
Payer: MEDICARE

## 2017-12-15 VITALS
WEIGHT: 153 LBS | SYSTOLIC BLOOD PRESSURE: 168 MMHG | HEART RATE: 65 BPM | TEMPERATURE: 96.6 F | HEIGHT: 64 IN | BODY MASS INDEX: 26.12 KG/M2 | OXYGEN SATURATION: 94 % | DIASTOLIC BLOOD PRESSURE: 96 MMHG | RESPIRATION RATE: 18 BRPM

## 2017-12-15 DIAGNOSIS — J20.9 ACUTE BRONCHITIS, UNSPECIFIED ORGANISM: Primary | ICD-10-CM

## 2017-12-15 PROCEDURE — 99213 OFFICE O/P EST LOW 20 MIN: CPT | Performed by: FAMILY MEDICINE

## 2017-12-15 RX ORDER — AZITHROMYCIN 250 MG/1
TABLET, FILM COATED ORAL
Qty: 6 TABLET | Refills: 0 | Status: SHIPPED | OUTPATIENT
Start: 2017-12-15 | End: 2018-08-13

## 2017-12-15 NOTE — MR AVS SNAPSHOT
"              After Visit Summary   12/15/2017    Catherine Salgado    MRN: [id number]           Patient Information     Date Of Birth          1940        Visit Information        Provider Department      12/15/2017 7:00 AM Hi Gaitan MD [facility]        Today's Diagnoses     Acute bronchitis, unspecified organism    -  1      Care Instructions          Thank you for choosing [facility].  You may be receiving a survey in the mail from [facility] regarding your visit today.  Please take a few minutes to complete and return the survey to let us know how we are doing.      Our Clinic hours are:  Mondays    7:20 am - 7 pm  Tues -  Fri  7:20 am - 5 pm    Clinic Phone: [phone]    The clinic lab opens at 7:30 am Mon - Fri and appointments are required.    [facility]  Ph. [phone]  Monday-Thursday 8 am - 7pm  Tues/Wed/Fri 8 am - 5:30 pm                 Follow-ups after your visit        Who to contact     If you have questions or need follow up information about today's clinic visit or your schedule please contact [facility] directly at [phone].  Normal or non-critical lab and imaging results will be communicated to you by MyChart, letter or phone within 4 business days after the clinic has received the results. If you do not hear from us within 7 days, please contact the clinic through MyChart or phone. If you have a critical or abnormal lab result, we will notify you by phone as soon as possible.  Submit refill requests through [employer] or call your pharmacy and they will forward the refill request to us. Please allow 3 business days for your refill to be completed.          Additional Information About Your Visit        MyChart Information     [employer] lets you send messages to your doctor, view your test results, renew your prescriptions, schedule appointments and more. To sign up, go to [email or web address] . Click on \"Log in\" " "on the left side of the screen, which will take you to the Welcome page. Then click on \"Sign up Now\" on the right side of the page.     You will be asked to enter the access code listed below, as well as some personal information. Please follow the directions to create your username and password.     Your access code is: [email or web address]  Expires: 3/15/2018  7:34 AM     Your access code will  in 90 days. If you need help or a new code, please call your [facility] or [phone].        Care EveryWhere ID     This is your Care EveryWhere ID. This could be used by other organizations to access your [facility] medical records  [id number]        Your Vitals Were     Pulse Temperature Respirations Height Pulse Oximetry BMI (Body Mass Index)    65 96.6  F (35.9  C) 18 5' 4\" (1.626 m) 94% 26.26 kg/m2       Blood Pressure from Last 3 Encounters:   12/15/17 (!) 168/96   17 144/80   17 (!) 159/96    Weight from Last 3 Encounters:   12/15/17 153 lb (69.4 kg)   17 159 lb (72.1 kg)   17 160 lb (72.6 kg)              Today, you had the following     No orders found for display         Today's Medication Changes          These changes are accurate as of: 12/15/17  9:09 AM.  If you have any questions, ask your nurse or doctor.               Start taking these medicines.        Dose/Directions    azithromycin 250 MG tablet   Commonly known as:  ZITHROMAX   Used for:  Acute bronchitis, unspecified organism   Started by:  Hi Gaitan MD        2 pills on day 1 then one pill daily   Quantity:  6 tablet   Refills:  0            Where to get your medicines      These medications were sent to [address] [address]     Phone:  [phone]     azithromycin 250 MG tablet                Primary Care Provider Office Phone # Fax #    Trip Correia PA-C [phone] [address] [place] [address]        Equal " Access to Services     [facility]: Hadii aad ku hadjojothu Deidreali, wakvngda luqadaha, qamary kahomermicheline valdez. So [facility] [phone].    ATENCIÓN: Si habla mc, tiene a sebastian disposición servicios gratuitos de asistencia lingüística. Llame al [phone].    We comply with applicable federal civil rights laws and [place] laws. We do not discriminate on the basis of race, color, national origin, age, disability, sex, sexual orientation, or gender identity.            Thank you!     Thank you for choosing [facility]  for your care. Our goal is always to provide you with excellent care. Hearing back from our patients is one way we can continue to improve our services. Please take a few minutes to complete the written survey that you may receive in the mail after your visit with us. Thank you!             Your Updated Medication List - Protect others around you: Learn how to safely use, store and throw away your medicines at www.disposemymeds.org.          This list is accurate as of: 12/15/17  9:09 AM.  Always use your most recent med list.                   Brand Name Dispense Instructions for use Diagnosis    aspirin 81 MG tablet      Take 162 mg by mouth every evening        azithromycin 250 MG tablet    ZITHROMAX    6 tablet    2 pills on day 1 then one pill daily    Acute bronchitis, unspecified organism       CoQ-10 100 MG Caps      Take by mouth daily        Garlic 500 MG Tabs           Iron 18 MG Tbcr      Take by mouth three times a week        metoprolol 25 MG 24 hr tablet    TOPROL-XL    30 tablet    Take 1 tablet (25 mg) by mouth daily    Hypertension goal BP (blood pressure) < 140/90       Selenium 200 MCG Caps      Take by mouth daily        vitamin B complex with vitamin C Tabs tablet      Take 1 tablet by mouth daily        VITAMIN C PO      Take 1,000 mg by mouth daily sometimes        Vitamin D-3 5000 UNITS Tabs      Take 1 tablet by  mouth daily.

## 2017-12-15 NOTE — PROGRESS NOTES
"  SUBJECTIVE:   Catherine Salgado is a 77 year old female who presents to clinic today for the following health issues:      ENT Symptoms             Symptoms: cc Present Absent Comment   Fever/Chills  x  Chills last week    Fatigue x x     Muscle Aches   x    Eye Irritation   x    Sneezing   x    Nasal Jonathan/Drg x x     Sinus Pressure/Pain x x     Loss of smell  x     Dental pain   x    Sore Throat  x  Off and on    Swollen Glands   x    Ear Pain/Fullness  x  Plugged    Cough x x     Wheeze  x     Chest Pain  x     Shortness of breath  x     Rash   x    Other         Symptom duration:  x 2 weeks    Symptom severity:  deep cough    Treatments tried:  OTC cough    Contacts:   has cold                Problem list and histories reviewed & adjusted, as indicated.  Additional history: as documented        Reviewed and updated as needed this visit by clinical staffTobacco  Allergies  Meds  Soc Hx      Reviewed and updated as needed this visit by Provider      Further history obtained, clarified or corrected by physician:  He has had productive cough over the last 2 weeks or more.  She has had some occasional low-grade fever.  She does not have shortness of breath.    OBJECTIVE:  BP (!) 168/96  Pulse 65  Temp 96.6  F (35.9  C)  Resp 18  Ht 5' 4\" (1.626 m)  Wt 153 lb (69.4 kg)  SpO2 94%  BMI 26.26 kg/m2   HEAD: AT/NC  EYES: PERRLA, EOMI, Sclerae clear, Fundi normal with sharp discs  EARS: TMs clear, canals normal  NOSE & THROAT: clear   LUNGS: Diffuse rhonchi without rales or wheezes  CV: RRR without murmur  ABD: BS+, soft, nontender, no masses, no hepatosplenomegaly    ASSESSMENT:  Acute bronchitis, unspecified organism    PLAN:  Orders Placed This Encounter     azithromycin (ZITHROMAX) 250 MG tablet         "

## 2017-12-15 NOTE — PATIENT INSTRUCTIONS
Thank you for choosing [facility].  You may be receiving a survey in the mail from [facility] regarding your visit today.  Please take a few minutes to complete and return the survey to let us know how we are doing.      Our Clinic hours are:  Mondays    7:20 am - 7 pm  Tues -  Fri  7:20 am - 5 pm    Clinic Phone: [phone]    The clinic lab opens at 7:30 am Mon - Fri and appointments are required.    [facility]. [phone]  Monday-Thursday 8 am - 7pm  Tues/Wed/Fri 8 am - 5:30 pm

## 2017-12-15 NOTE — NURSING NOTE
"Chief Complaint   Patient presents with     URI       Initial BP (!) 194/103  Pulse 65  Temp 96.6  F (35.9  C)  Resp 18  Wt 153 lb (69.4 kg)  SpO2 94%  BMI 26.26 kg/m2 Estimated body mass index is 26.26 kg/(m^2) as calculated from the following:    Height as of 2/24/17: 5' 4\" (1.626 m).    Weight as of this encounter: 153 lb (69.4 kg).  Medication Reconciliation: complete   Macy iRbeiro CMA      "

## 2018-08-13 ENCOUNTER — OFFICE VISIT (OUTPATIENT)
Dept: FAMILY MEDICINE | Facility: CLINIC | Age: 78
End: 2018-08-13
Payer: MEDICARE

## 2018-08-13 VITALS
HEIGHT: 64 IN | HEART RATE: 64 BPM | TEMPERATURE: 97.5 F | DIASTOLIC BLOOD PRESSURE: 70 MMHG | WEIGHT: 154 LBS | BODY MASS INDEX: 26.29 KG/M2 | RESPIRATION RATE: 16 BRPM | SYSTOLIC BLOOD PRESSURE: 142 MMHG | OXYGEN SATURATION: 97 %

## 2018-08-13 DIAGNOSIS — J06.9 VIRAL UPPER RESPIRATORY TRACT INFECTION WITH COUGH: Primary | ICD-10-CM

## 2018-08-13 PROCEDURE — 99213 OFFICE O/P EST LOW 20 MIN: CPT | Performed by: PHYSICIAN ASSISTANT

## 2018-08-13 ASSESSMENT — ENCOUNTER SYMPTOMS
FEVER: 0
ALLERGIC/IMMUNOLOGIC NEGATIVE: 1
ARTHRALGIAS: 0
JOINT SWELLING: 0
SHORTNESS OF BREATH: 0
HEMATOLOGIC/LYMPHATIC NEGATIVE: 1
ENDOCRINE NEGATIVE: 1
BACK PAIN: 0
NAUSEA: 0
CHILLS: 0
RHINORRHEA: 0
DIZZINESS: 0
HEADACHES: 0
CARDIOVASCULAR NEGATIVE: 1
MYALGIAS: 0
VOMITING: 0
COUGH: 1
DIARRHEA: 0
LIGHT-HEADEDNESS: 0
PALPITATIONS: 0
NECK PAIN: 0
EYES NEGATIVE: 1
WEAKNESS: 0
NECK STIFFNESS: 0
WOUND: 0
MUSCULOSKELETAL NEGATIVE: 1
BRUISES/BLEEDS EASILY: 0
SORE THROAT: 0

## 2018-08-13 NOTE — MR AVS SNAPSHOT
"              After Visit Summary   8/13/2018    Catherine Salgado    MRN: [id number]           Patient Information     Date Of Birth          1940        Visit Information        Provider Department      8/13/2018 1:40 PM Vu Nixon PA-C [facility]        Today's Diagnoses     Viral upper respiratory tract infection with cough    -  1       Follow-ups after your visit        Who to contact     If you have questions or need follow up information about today's clinic visit or your schedule please contact [facility] directly at [phone].  Normal or non-critical lab and imaging results will be communicated to you by MyChart, letter or phone within 4 business days after the clinic has received the results. If you do not hear from us within 7 days, please contact the clinic through MyChart or phone. If you have a critical or abnormal lab result, we will notify you by phone as soon as possible.  Submit refill requests through [employer] or call your pharmacy and they will forward the refill request to us. Please allow 3 business days for your refill to be completed.          Additional Information About Your Visit        Care EveryWhere ID     This is your Care EveryWhere ID. This could be used by other organizations to access your [facility] medical records  [id number]        Your Vitals Were     Pulse Temperature Respirations Height Pulse Oximetry BMI (Body Mass Index)    64 97.5  F (36.4  C) (Tympanic) 16 5' 4\" (1.626 m) 97% 26.43 kg/m2       Blood Pressure from Last 3 Encounters:   08/13/18 142/70   12/15/17 (!) 168/96   07/05/17 144/80    Weight from Last 3 Encounters:   08/13/18 154 lb (69.9 kg)   12/15/17 153 lb (69.4 kg)   07/05/17 159 lb (72.1 kg)              Today, you had the following     No orders found for display       Primary Care Provider Office Phone # Fax #    Trip Correia PA-C [phone] [address] [place] [address]   "      Equal Access to Services     [facility] : Hadii aad ku hadjojothu Lilidion, wakvngda luqadaha, qanataleeta shakeelhomermicheline valdez. So [facility] [phone].    ATENCIÓN: Si habla español, tiene a sebastian disposición servicios gratuitos de asistencia lingüística. Divineame al [phone].    We comply with applicable federal civil rights laws and [place] laws. We do not discriminate on the basis of race, color, national origin, age, disability, sex, sexual orientation, or gender identity.            Thank you!     Thank you for choosing [facility]  for your care. Our goal is always to provide you with excellent care. Hearing back from our patients is one way we can continue to improve our services. Please take a few minutes to complete the written survey that you may receive in the mail after your visit with us. Thank you!             Your Updated Medication List - Protect others around you: Learn how to safely use, store and throw away your medicines at www.disposemymeds.org.          This list is accurate as of 8/13/18  2:17 PM.  Always use your most recent med list.                   Brand Name Dispense Instructions for use Diagnosis    aspirin 81 MG tablet      Take 162 mg by mouth every evening        CoQ-10 100 MG Caps      Take by mouth daily        Garlic 500 MG Tabs           Iron 18 MG Tbcr      Take by mouth three times a week        metoprolol succinate 25 MG 24 hr tablet    TOPROL-XL    30 tablet    Take 1 tablet (25 mg) by mouth daily    Hypertension goal BP (blood pressure) < 140/90       Selenium 200 MCG Caps      Take by mouth daily        vitamin B complex with vitamin C Tabs tablet      Take 1 tablet by mouth daily        VITAMIN C PO      Take 1,000 mg by mouth daily sometimes        Vitamin D-3 5000 units Tabs      Take 1 tablet by mouth daily.

## 2018-08-13 NOTE — NURSING NOTE
"Chief Complaint   Patient presents with     Cough       Initial /70  Pulse 64  Temp 97.5  F (36.4  C) (Tympanic)  Resp 16  Ht 5' 4\" (1.626 m)  Wt 154 lb (69.9 kg)  SpO2 97%  BMI 26.43 kg/m2 Estimated body mass index is 26.43 kg/(m^2) as calculated from the following:    Height as of this encounter: 5' 4\" (1.626 m).    Weight as of this encounter: 154 lb (69.9 kg).      Health Maintenance that is potentially due pending provider review:  NONE    n/a    Is there anyone who you would like to be able to receive your results? No  If yes have patient fill out SIMIN      "

## 2018-08-13 NOTE — PROGRESS NOTES
Chief Complaint:     Chief Complaint   Patient presents with     Cough       HPI: Catherine Salgado is an 78 year old female who presents with a cough.   It began  6 day(s) ago and has unchanged.  Cough is dry There is no shortness of breath, wheezing and chest pain. Her son and children have similar symptoms that are resolving.     Associated symptoms: congestion.    Recent travel?  no.      ROS:     Review of Systems   Constitutional: Negative for chills and fever.   HENT: Negative for congestion, ear pain, rhinorrhea and sore throat.    Eyes: Negative.    Respiratory: Positive for cough. Negative for shortness of breath.    Cardiovascular: Negative.  Negative for chest pain and palpitations.   Gastrointestinal: Negative for diarrhea, nausea and vomiting.   Endocrine: Negative.    Genitourinary: Negative.    Musculoskeletal: Negative.  Negative for arthralgias, back pain, joint swelling, myalgias, neck pain and neck stiffness.   Skin: Negative.  Negative for rash and wound.   Allergic/Immunologic: Negative.  Negative for immunocompromised state.   Neurological: Negative for dizziness, weakness, light-headedness and headaches.   Hematological: Negative.  Does not bruise/bleed easily.        Respiratory History  occasional episodes of bronchitis    No pertinent family Hx at this time.  Patient has never smoked.     Family History   Family History   Problem Relation Age of Onset     Cerebrovascular Disease Mother      bianca ALBERTASHIRLEY Father      Cancer Father      kidney CA     Cardiovascular Sister      cardiac arrest     Musculoskeletal Disorder Daughter      rotator cuff, knee repair     Diabetes Sister      Musculoskeletal Disorder Daughter      ankle and arm injuries     Musculoskeletal Disorder Daughter      Knees     Hypertension Sister         Problem history  Patient Active Problem List   Diagnosis     Hypertension goal BP (blood pressure) < 140/90     Macular degeneration (senile) of retina      Hyperlipidemia     Phlebitis and thrombophlebitis of other deep vessels of lower extremities     Bunion     Generalized osteoarthrosis, unspecified site     HYPERLIPIDEMIA LDL GOAL <130     Advanced directives, counseling/discussion     Hypothyroidism     IT band syndrome     Cerebral infarction (H)     Health Care Home        Allergies  Allergies   Allergen Reactions     Wasps [Hornets] Anaphylaxis     Passes out     Gadavist [Gadobutrol] Other (See Comments)     Did ok if benadryl is given.      Ivp Dye [Contrast Dye] Other (See Comments)     Severe arthritic reaction     Penicillins Hives        Social History  Social History     Social History     Marital status:      Spouse name: N/A     Number of children: N/A     Years of education: N/A     Occupational History     Not on file.     Social History Main Topics     Smoking status: Current Some Day Smoker     Packs/day: 0.25     Years: 40.00     Types: Cigarettes     Smokeless tobacco: Never Used     Alcohol use Yes      Comment: rare     Drug use: No     Sexual activity: Yes     Partners: Male     Birth control/ protection: Surgical     Other Topics Concern     Parent/Sibling W/ Cabg, Mi Or Angioplasty Before 65f 55m? Yes     father     Social History Narrative        Current Meds    Current Outpatient Prescriptions:      Ascorbic Acid (VITAMIN C PO), Take 1,000 mg by mouth daily sometimes, Disp: , Rfl:      aspirin 81 MG tablet, Take 162 mg by mouth every evening , Disp: , Rfl:      Cholecalciferol (VITAMIN D-3) 5000 UNIT TABS, Take 1 tablet by mouth daily., Disp: , Rfl:      Coenzyme Q10 (COQ-10) 100 MG CAPS, Take by mouth daily, Disp: , Rfl:      Ferrous Fumarate (IRON) 18 MG TBCR, Take by mouth three times a week, Disp: , Rfl:      Garlic 500 MG TABS, , Disp: , Rfl:      Selenium 200 MCG CAPS, Take by mouth daily, Disp: , Rfl:      vitamin B complex with vitamin C (VITAMIN  B COMPLEX) TABS tablet, Take 1 tablet by mouth daily, Disp: , Rfl:       "metoprolol (TOPROL-XL) 25 MG 24 hr tablet, Take 1 tablet (25 mg) by mouth daily (Patient not taking: Reported on 12/15/2017), Disp: 30 tablet, Rfl: 1        OBJECTIVE     Vital signs reviewed by Vu Nixon  /70  Pulse 64  Temp 97.5  F (36.4  C) (Tympanic)  Resp 16  Ht 5' 4\" (1.626 m)  Wt 154 lb (69.9 kg)  SpO2 97%  BMI 26.43 kg/m2     Physical Exam   Constitutional: She is oriented to person, place, and time. She appears well-developed and well-nourished. No distress.   HENT:   Head: Normocephalic and atraumatic.   Right Ear: Tympanic membrane and external ear normal.   Left Ear: Tympanic membrane and external ear normal.   Mouth/Throat: Oropharynx is clear and moist.   Eyes: EOM are normal. Pupils are equal, round, and reactive to light.   Neck: Normal range of motion. Neck supple.   Cardiovascular: Normal rate, regular rhythm, normal heart sounds and intact distal pulses.  Exam reveals no gallop and no friction rub.    No murmur heard.  Pulmonary/Chest: Effort normal and breath sounds normal. No respiratory distress.   Abdominal: Soft. Bowel sounds are normal. She exhibits no distension and no mass. There is no tenderness. There is no guarding.   Lymphadenopathy:     She has no cervical adenopathy.   Neurological: She is alert and oriented to person, place, and time. She has normal reflexes. No cranial nerve deficit.   Skin: Skin is warm and dry. She is not diaphoretic.   Psychiatric: She has a normal mood and affect. Her behavior is normal. Judgment and thought content normal.   Nursing note and vitals reviewed.          Medical Decision Making:    Differential Diagnosis:  URI Adult/Peds:  Bronchitis-viral, viral upper resp infection        ASSESSMENT    1. Viral upper respiratory tract infection with cough        PLAN     Rest, Push fluids, vaporizer, elevation of head of bed.  Ibuprofen and or Tylenol for any fever or body aches.  Over the counter cough suppressant- PRN- as discussed.   If " symptoms worsen, recheck immediately otherwise follow up with your PCP PRN.  Worrisome symptoms discussed with instructions to go to the ED.  Patient verbalized understanding and agreed with this plan.         Vu Nixon  8/13/2018, 2:07 PM

## 2018-08-21 ENCOUNTER — OFFICE VISIT (OUTPATIENT)
Dept: FAMILY MEDICINE | Facility: CLINIC | Age: 78
End: 2018-08-21
Payer: MEDICARE

## 2018-08-21 VITALS
OXYGEN SATURATION: 96 % | BODY MASS INDEX: 26.29 KG/M2 | SYSTOLIC BLOOD PRESSURE: 112 MMHG | RESPIRATION RATE: 16 BRPM | HEIGHT: 64 IN | HEART RATE: 60 BPM | TEMPERATURE: 97.6 F | WEIGHT: 154 LBS | DIASTOLIC BLOOD PRESSURE: 60 MMHG

## 2018-08-21 DIAGNOSIS — T63.441A BEE STING REACTION, ACCIDENTAL OR UNINTENTIONAL, INITIAL ENCOUNTER: Primary | ICD-10-CM

## 2018-08-21 PROCEDURE — 99213 OFFICE O/P EST LOW 20 MIN: CPT | Performed by: NURSE PRACTITIONER

## 2018-08-21 RX ORDER — EPINEPHRINE 0.3 MG/.3ML
0.3 INJECTION SUBCUTANEOUS PRN
Qty: 0.6 ML | Refills: 0 | Status: SHIPPED | OUTPATIENT
Start: 2018-08-21 | End: 2020-04-23

## 2018-08-21 NOTE — MR AVS SNAPSHOT
"              After Visit Summary   8/21/2018    Catherine Salgado    MRN: [id number]           Patient Information     Date Of Birth          1940        Visit Information        Provider Department      8/21/2018 11:00 AM Jenn Burnham NP [facility]        Today's Diagnoses     Bee sting reaction, accidental or unintentional, initial encounter    -  1      Care Instructions    1.  Use Benedryl 25-50mg as needed for swelling.  2.  Use ibuprofen for pain.  3.  Epi pen ordered to keep on hand if this occurs again to reduce swelling and breathing issues.          Follow-ups after your visit        Follow-up notes from your care team     Return in about 1 week (around 8/28/2018), or if symptoms worsen or fail to improve.      Who to contact     If you have questions or need follow up information about today's clinic visit or your schedule please contact [facility] directly at [phone].  Normal or non-critical lab and imaging results will be communicated to you by MyChart, letter or phone within 4 business days after the clinic has received the results. If you do not hear from us within 7 days, please contact the clinic through MyChart or phone. If you have a critical or abnormal lab result, we will notify you by phone as soon as possible.  Submit refill requests through [employer] or call your pharmacy and they will forward the refill request to us. Please allow 3 business days for your refill to be completed.          Additional Information About Your Visit        Care EveryWhere ID     This is your Care EveryWhere ID. This could be used by other organizations to access your [place] medical records  [id number]        Your Vitals Were     Pulse Temperature Respirations Height Pulse Oximetry BMI (Body Mass Index)    60 97.6  F (36.4  C) (Tympanic) 16 5' 4\" (1.626 m) 96% 26.43 kg/m2       Blood Pressure from Last 3 Encounters:   08/21/18 112/60   08/13/18 142/70 "   12/15/17 (!) 168/96    Weight from Last 3 Encounters:   08/21/18 154 lb (69.9 kg)   08/13/18 154 lb (69.9 kg)   12/15/17 153 lb (69.4 kg)              Today, you had the following     No orders found for display         Today's Medication Changes          These changes are accurate as of 8/21/18 11:54 AM.  If you have any questions, ask your nurse or doctor.               Start taking these medicines.        Dose/Directions    EPINEPHrine 0.3 MG/0.3ML injection 2-pack   Commonly known as:  ADRENACLICK   Used for:  Bee sting reaction, accidental or unintentional, initial encounter   Started by:  Jenn Burnham, NP        Dose:  0.3 mg   Inject 0.3 mLs (0.3 mg) into the muscle as needed for anaphylaxis   Quantity:  0.6 mL   Refills:  0            Where to get your medicines      These medications were sent to [facility] [id number] [facility]  [id number] [facility] [address]     Phone:  [phone]     EPINEPHrine 0.3 MG/0.3ML injection 2-pack                Primary Care Provider    None Specified       No primary provider on file.        Equal Access to Services     MARGARITA SHELTON : Hadii toma Wright, wakvngda lubi, qaybta kaalmada adeleonilada, micheline murray . So [facility] [phone].    ATENCIÓN: Si habla español, tiene a sebastian disposición servicios gratuitos de asistencia lingüística. Llame al [phone].    We comply with applicable federal civil rights laws and [place] laws. We do not discriminate on the basis of race, color, national origin, age, disability, sex, sexual orientation, or gender identity.            Thank you!     Thank you for choosing [facility]  for your care. Our goal is always to provide you with excellent care. Hearing back from our patients is one way we can continue to improve our services. Please take a few minutes to complete the written survey that you may receive in the mail after your visit  with us. Thank you!             Your Updated Medication List - Protect others around you: Learn how to safely use, store and throw away your medicines at www.disposemymeds.org.          This list is accurate as of 8/21/18 11:54 AM.  Always use your most recent med list.                   Brand Name Dispense Instructions for use Diagnosis    aspirin 81 MG tablet      Take 162 mg by mouth every evening        CoQ-10 100 MG Caps      Take by mouth daily        EPINEPHrine 0.3 MG/0.3ML injection 2-pack    ADRENACLICK    0.6 mL    Inject 0.3 mLs (0.3 mg) into the muscle as needed for anaphylaxis    Bee sting reaction, accidental or unintentional, initial encounter       Garlic 500 MG Tabs           Iron 18 MG Tbcr      Take by mouth three times a week        Selenium 200 MCG Caps      Take by mouth daily        vitamin B complex with vitamin C Tabs tablet      Take 1 tablet by mouth daily        VITAMIN C PO      Take 1,000 mg by mouth daily sometimes        Vitamin D-3 5000 units Tabs      Take 1 tablet by mouth daily.

## 2018-08-21 NOTE — PATIENT INSTRUCTIONS
1.  Use Benedryl 25-50mg as needed for swelling.  2.  Use ibuprofen for pain.  3.  Epi pen ordered to keep on hand if this occurs again to reduce swelling and breathing issues.

## 2018-08-21 NOTE — PROGRESS NOTES
SUBJECTIVE:   Catherine Salgado is a 78 year old female who presents to clinic today for the following health issues:    Chief Complaint   Patient presents with     Insect Bites     Pt was stug on the nose by a bee yesterday (8/20/18). Her face is now swollen. No SOB noted.       Concern - Bee sting  Onset: 1 day ago    Description:   Pt was stung by a bee on the end of her nose yesterday at 1:30pm. Her face started swelling and soon after the sting. No SOB noted.    Intensity: severe    Progression of Symptoms:  worsening and constant    Accompanying Signs & Symptoms:  Facial swelling. No SOB, or swallowing problems noted.    Previous history of similar problem:   Pt  Has had similar reactions to bee stings in the past, but never in her face.  She states that there was one time that she had trouble breathing. She is requesting an Epi pen.    Precipitating factors:   Worsened by: None    Alleviating factors:  Improved by: Ice, antihistamines have helped.    Therapies Tried and outcome: Ice and OTC antihistamines have helped minimally.      Problem list and histories reviewed & adjusted, as indicated.  Additional history: as documented    Patient Active Problem List   Diagnosis     Hypertension goal BP (blood pressure) < 140/90     Macular degeneration (senile) of retina     Hyperlipidemia     Phlebitis and thrombophlebitis of other deep vessels of lower extremities     Bunion     Generalized osteoarthrosis, unspecified site     HYPERLIPIDEMIA LDL GOAL <130     Advanced directives, counseling/discussion     Hypothyroidism     IT band syndrome     Cerebral infarction (H)     Health Care Home     Past Surgical History:   Procedure Laterality Date     COLONOSCOPY  5/27/2005    Diverticulosis     SURGICAL HISTORY OF -       T&A     SURGICAL HISTORY OF -   10/1976    Vaginal hysterectomy, A & P repair       Social History   Substance Use Topics     Smoking status: Current Some Day Smoker     Packs/day: 0.25     Years:  40.00     Types: Cigarettes     Smokeless tobacco: Never Used     Alcohol use Yes      Comment: rare     Family History   Problem Relation Age of Onset     Cerebrovascular Disease Mother      sebral hemmerage     C.A.D. Father      Cancer Father      kidney CA     Cardiovascular Sister      cardiac arrest     Musculoskeletal Disorder Daughter      rotator cuff, knee repair     Diabetes Sister      Musculoskeletal Disorder Daughter      ankle and arm injuries     Musculoskeletal Disorder Daughter      Knees     Hypertension Sister          Current Outpatient Prescriptions   Medication Sig Dispense Refill     Ascorbic Acid (VITAMIN C PO) Take 1,000 mg by mouth daily sometimes       aspirin 81 MG tablet Take 162 mg by mouth every evening        Cholecalciferol (VITAMIN D-3) 5000 UNIT TABS Take 1 tablet by mouth daily.       Coenzyme Q10 (COQ-10) 100 MG CAPS Take by mouth daily       EPINEPHrine (ADRENACLICK) 0.3 MG/0.3ML injection 2-pack Inject 0.3 mLs (0.3 mg) into the muscle as needed for anaphylaxis 0.6 mL 0     Ferrous Fumarate (IRON) 18 MG TBCR Take by mouth three times a week       Garlic 500 MG TABS        Selenium 200 MCG CAPS Take by mouth daily       vitamin B complex with vitamin C (VITAMIN  B COMPLEX) TABS tablet Take 1 tablet by mouth daily       Allergies   Allergen Reactions     Wasps [Hornets] Anaphylaxis     Passes out     Gadavist [Gadobutrol] Other (See Comments)     Did ok if benadryl is given.      Ivp Dye [Contrast Dye] Other (See Comments)     Severe arthritic reaction     Penicillins Hives       Reviewed and updated as needed this visit by clinical staff  Tobacco  Allergies  Meds  Problems       Reviewed and updated as needed this visit by Provider  Allergies  Meds  Problems         ROS:  CONSTITUTIONAL: NEGATIVE for fever, chills, change in weight  INTEGUMENTARY/SKIN: POSITIVE for swelling in the face around right eye more than left and bilateral cheeks.  Nose if slightly swollen where  "bite occurred.  Taking antihistamines and ibuprofen for pain.  Symptoms are getting mildly better.  No shortness of breath or swelling of the lips or tongue.  RESP: NEGATIVE for significant cough or SOB  CV: NEGATIVE for chest pain, palpitations or peripheral edema  PSYCHIATRIC: NEGATIVE for changes in mood or affect  ROS otherwise negative    OBJECTIVE:     /60  Pulse 60  Temp 97.6  F (36.4  C) (Tympanic)  Resp 16  Ht 5' 4\" (1.626 m)  Wt 154 lb (69.9 kg)  SpO2 96%  BMI 26.43 kg/m2  Body mass index is 26.43 kg/(m^2).  GENERAL: healthy, alert and no distress  RESP: lungs clear to auscultation - no rales, rhonchi or wheezes  CV: regular rate and rhythm, normal S1 S2, no S3 or S4, no murmur, click or rub, no peripheral edema and peripheral pulses strong  SKIN: no suspicious lesions or rashes and moderate swelling to upper face mostly on the right around the eye and upper cheek with mild swelling on left medial eye and upper cheek    Diagnostic Test Results:  none     ASSESSMENT/PLAN:     1. Bee sting reaction, accidental or unintentional, initial encounter  Reassured her that she does not need any treatment except continuing antihistamines and ibuprofen.  Due to history and severe swelling with this bite, ordered epi pen to use as needed for future bee stings. Follow-up if any persistent symptoms in 1 week.  - EPINEPHrine (ADRENACLICK) 0.3 MG/0.3ML injection 2-pack; Inject 0.3 mLs (0.3 mg) into the muscle as needed for anaphylaxis  Dispense: 0.6 mL; Refill: 0    See Patient Instructions    Jenn Burnham NP  [facility]    "

## 2019-02-01 ENCOUNTER — OFFICE VISIT (OUTPATIENT)
Dept: OBGYN | Facility: CLINIC | Age: 79
End: 2019-02-01
Payer: MEDICARE

## 2019-02-01 VITALS
WEIGHT: 153 LBS | DIASTOLIC BLOOD PRESSURE: 85 MMHG | SYSTOLIC BLOOD PRESSURE: 146 MMHG | RESPIRATION RATE: 18 BRPM | TEMPERATURE: 96.9 F | HEART RATE: 53 BPM | HEIGHT: 64 IN | BODY MASS INDEX: 26.12 KG/M2

## 2019-02-01 DIAGNOSIS — N95.1 MENOPAUSAL SYNDROME (HOT FLASHES): ICD-10-CM

## 2019-02-01 DIAGNOSIS — E03.9 HYPOTHYROIDISM, UNSPECIFIED TYPE: ICD-10-CM

## 2019-02-01 DIAGNOSIS — R63.5 ABNORMAL WEIGHT GAIN: ICD-10-CM

## 2019-02-01 DIAGNOSIS — E03.9 HYPOTHYROIDISM: Primary | ICD-10-CM

## 2019-02-01 DIAGNOSIS — N95.2 VAGINAL ATROPHY: ICD-10-CM

## 2019-02-01 DIAGNOSIS — N39.41 URGE INCONTINENCE OF URINE: ICD-10-CM

## 2019-02-01 DIAGNOSIS — N81.10 VAGINAL WALL PROLAPSE: Primary | ICD-10-CM

## 2019-02-01 LAB
ALBUMIN UR-MCNC: NEGATIVE MG/DL
APPEARANCE UR: ABNORMAL
BACTERIA #/AREA URNS HPF: ABNORMAL /HPF
BILIRUB UR QL STRIP: NEGATIVE
COLOR UR AUTO: YELLOW
GLUCOSE UR STRIP-MCNC: NEGATIVE MG/DL
HGB UR QL STRIP: ABNORMAL
KETONES UR STRIP-MCNC: NEGATIVE MG/DL
LEUKOCYTE ESTERASE UR QL STRIP: ABNORMAL
NITRATE UR QL: POSITIVE
NON-SQ EPI CELLS #/AREA URNS LPF: ABNORMAL /LPF
PH UR STRIP: 6 PH (ref 5–7)
RBC #/AREA URNS AUTO: ABNORMAL /HPF
SOURCE: ABNORMAL
SP GR UR STRIP: 1.01 (ref 1–1.03)
T4 FREE SERPL-MCNC: 0.86 NG/DL (ref 0.76–1.46)
TSH SERPL DL<=0.005 MIU/L-ACNC: 12.69 MU/L (ref 0.4–4)
UROBILINOGEN UR STRIP-ACNC: 0.2 EU/DL (ref 0.2–1)
WBC #/AREA URNS AUTO: ABNORMAL /HPF

## 2019-02-01 PROCEDURE — 84439 ASSAY OF FREE THYROXINE: CPT | Performed by: OBSTETRICS & GYNECOLOGY

## 2019-02-01 PROCEDURE — 81001 URINALYSIS AUTO W/SCOPE: CPT | Performed by: OBSTETRICS & GYNECOLOGY

## 2019-02-01 PROCEDURE — 99203 OFFICE O/P NEW LOW 30 MIN: CPT | Mod: 25 | Performed by: OBSTETRICS & GYNECOLOGY

## 2019-02-01 PROCEDURE — 84443 ASSAY THYROID STIM HORMONE: CPT | Performed by: OBSTETRICS & GYNECOLOGY

## 2019-02-01 PROCEDURE — 51798 US URINE CAPACITY MEASURE: CPT | Performed by: OBSTETRICS & GYNECOLOGY

## 2019-02-01 PROCEDURE — 36415 COLL VENOUS BLD VENIPUNCTURE: CPT | Performed by: OBSTETRICS & GYNECOLOGY

## 2019-02-01 RX ORDER — ESTRADIOL 0.5 MG/1
TABLET ORAL
Qty: 24 TABLET | Refills: 3 | Status: SHIPPED | OUTPATIENT
Start: 2019-02-01 | End: 2021-05-20

## 2019-02-01 RX ORDER — SPIRONOLACTONE 25 MG
1 TABLET ORAL DAILY
COMMUNITY
End: 2024-08-07

## 2019-02-01 RX ORDER — LEVOTHYROXINE SODIUM 50 UG/1
50 TABLET ORAL DAILY
Qty: 30 TABLET | Refills: 0 | Status: SHIPPED | OUTPATIENT
Start: 2019-02-01 | End: 2019-03-04

## 2019-02-01 RX ORDER — THYROID 60 MG/1
60 TABLET ORAL 2 TIMES DAILY
Qty: 180 TABLET | Refills: 3 | Status: CANCELLED | OUTPATIENT
Start: 2019-02-01

## 2019-02-01 ASSESSMENT — MIFFLIN-ST. JEOR: SCORE: 1169

## 2019-02-01 NOTE — PROGRESS NOTES
Catherine is a 78 year old   female who presents for f/u of vaginal wall prolapse; she underwent TOTAL VAGINAL HYSTERECTOMY, A & P repair in  and was seen in f/u in , where 1+ cystocele, 2+ apical and 2+ high rectocele seen; she has never been on HRT due to h/o blood clot; she is sexually active and notices an odor vaginally; she has no urinary hesitancy but does have urgency with leakage; she is not constipated; no ithcing or burning.    Patient Active Problem List    Diagnosis Date Noted     Health Care Home 2015     Priority: Medium     *See Letters for [facility] Care Plan: My Access Plan         Cerebral infarction (H) 10/31/2015     Priority: Medium     Diagnosis updated by automated process. Provider to review and confirm.       IT band syndrome 2014     Priority: Medium     Hypothyroidism 2013     Priority: Medium     Advanced directives, counseling/discussion 2013     Priority: Medium     Patient does not have an Advance/Health Care Directive (HCD), declines information/referral.    Fern Staton  2013         HYPERLIPIDEMIA LDL GOAL <130 10/31/2010     Priority: Medium     Hypertension goal BP (blood pressure) < 140/90      Priority: Medium     dx date - ?  CV risk factors previous smoker, family history and lipids  FH positive for diabetes mellitus and cardiovascular disease  history of renal disease negative       Macular degeneration (senile) of retina      Priority: Medium     Problem list name updated by automated process. Provider to review       Hyperlipidemia      Priority: Medium     CHOL      282   2005  LDL      177   2005  HDL       35   2005  Problem list name updated by automated process. Provider to review       Phlebitis and thrombophlebitis of other deep vessels of lower extremities      Priority: Medium     DVT on OCP       Bunion      Priority: Medium     Bilateral       Generalized osteoarthrosis, unspecified site      Priority:  Medium     Cervical         All systems were reviewed and pertinent information in noted in subjective/HPI.    Past Medical History:   Diagnosis Date     Bunion     Bilateral     Chest pain, unspecified      Generalized osteoarthrosis, unspecified site     Cervical     Macular degeneration (senile) of retina, unspecified      Other and unspecified hyperlipidemia      Phlebitis and thrombophlebitis of other deep vessels of lower extremities     DVT on OCP     TOBACCO ABUSE-CONTINUOUS      Unspecified essential hypertension        Past Surgical History:   Procedure Laterality Date     COLONOSCOPY  5/27/2005    Diverticulosis     SURGICAL HISTORY OF -       T&A     SURGICAL HISTORY OF -   10/1976    Vaginal hysterectomy, A & P repair         Current Outpatient Medications:      Ascorbic Acid (VITAMIN C PO), Take 1,000 mg by mouth daily sometimes, Disp: , Rfl:      aspirin 81 MG tablet, Take 162 mg by mouth every evening , Disp: , Rfl:      Cholecalciferol (VITAMIN D-3) 5000 UNIT TABS, Take 1 tablet by mouth daily., Disp: , Rfl:      Coenzyme Q10 (COQ-10) 100 MG CAPS, Take by mouth daily, Disp: , Rfl:      EPINEPHrine (ADRENACLICK) 0.3 MG/0.3ML injection 2-pack, Inject 0.3 mLs (0.3 mg) into the muscle as needed for anaphylaxis, Disp: 0.6 mL, Rfl: 0     estradiol (ESTRACE) 0.5 MG tablet, Place 1 tab vaginally at bedtime twice a week, Disp: 24 tablet, Rfl: 3     Ferrous Fumarate (IRON) 18 MG TBCR, Take by mouth three times a week, Disp: , Rfl:      Garlic 500 MG TABS, , Disp: , Rfl:      Lutein 20 MG CAPS, , Disp: , Rfl:      Selenium 200 MCG CAPS, Take by mouth daily, Disp: , Rfl:      vitamin B complex with vitamin C (VITAMIN  B COMPLEX) TABS tablet, Take 1 tablet by mouth daily, Disp: , Rfl:     ALLERGIES:  Wasps [hornets]; Gadavist [gadobutrol]; Ivp dye [contrast dye]; and Penicillins    Social History     Socioeconomic History     Marital status:      Spouse name: None     Number of children: None     Years of  "education: None     Highest education level: None   Social Needs     Financial resource strain: None     Food insecurity - worry: None     Food insecurity - inability: None     Transportation needs - medical: None     Transportation needs - non-medical: None   Occupational History     None   Tobacco Use     Smoking status: Current Some Day Smoker     Packs/day: 0.25     Years: 40.00     Pack years: 10.00     Types: Cigarettes     Smokeless tobacco: Never Used   Substance and Sexual Activity     Alcohol use: Yes     Comment: rare     Drug use: No     Sexual activity: Yes     Partners: Male     Birth control/protection: Surgical   Other Topics Concern     Parent/sibling w/ CABG, MI or angioplasty before 65F 55M? Yes     Comment: father   Social History Narrative     None       Family History   Problem Relation Age of Onset     Cerebrovascular Disease Mother         sebral hemmerage     C.A.D. Father      Cancer Father         kidney CA     Cardiovascular Sister         cardiac arrest     Musculoskeletal Disorder Daughter         rotator cuff, knee repair     Diabetes Sister      Musculoskeletal Disorder Daughter         ankle and arm injuries     Musculoskeletal Disorder Daughter         Knees     Hypertension Sister        OBJECTIVE:  Vitals: /85 (BP Location: Left arm, Patient Position: Chair, Cuff Size: Adult Small)   Pulse 53   Temp 96.9  F (36.1  C) (Tympanic)   Resp 18   Ht 1.626 m (5' 4\")   Wt 69.4 kg (153 lb)   BMI 26.26 kg/m   BMI= Body mass index is 26.26 kg/m .   No LMP recorded. Patient has had a hysterectomy.     GENERAL APPEARANCE: healthy, alert and no distress  ABDOMEN:  soft, nontender, no hepato-splenomegaly or hernias  PELVIC:  EGBUS:  Atrophic with urethral caruncle  VAGINA:  UV angle stable; no leak with cough; 1+ cystocele; 2+ apical and 3+ high rectocele noted; tissues atrophic    POSTVOID RESIDUAL= 0 cc    ASSESSMENT:      ICD-10-CM    1. Vaginal wall prolapse N81.10    2. 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 Urge incontinence of urine N39.41        PLAN:  Recommend low dose vaginal estradiol to see if can improve status of tissue, decrease odor and improve urge incontinence; f/u 4-6 weeks  Check TSH  Check UA  Avoidance of bladder irritants (list given)    Komal Sarah MD    Duration of visit:  30 minutes, >50% in discussion of current issues, treatment options and treatment planning.  KOMAL SARAH MD

## 2019-02-01 NOTE — PATIENT INSTRUCTIONS
Bladder irritants        All Alcoholic Beverages  Grapes  Apples Guava  Apple Juice Peaches  Artificial Sweeteners Pineapple  [place] Plums  Carbonation Tea  Chilies/Spicy Foods Tomatoes  Citrus Fruits Chocolate  Coffee (including Decaf) Vitamin B Complex  Strawberries  Vinegar  Caffeine  Cranberries    Substitutions:  Low Acid Fruits: Pears, Apricots, Papaya, Watermelon,   Unsweetened Cranberry Juice, Grape   Juice, Cranberry Capsules    For Coffee Drinkers: KAVA (Low Acid Instant), Cold Brew   From Ally Puente    For Tea Drinkers: Non-Citrus Herbal, Sun Brewed Tea    Vitamin C Substitute: Calcium Carbonate, Co-Buffered   With Calcium Ascorbate

## 2019-03-04 ENCOUNTER — OFFICE VISIT (OUTPATIENT)
Dept: FAMILY MEDICINE | Facility: CLINIC | Age: 79
End: 2019-03-04
Payer: MEDICARE

## 2019-03-04 VITALS
HEIGHT: 64 IN | HEART RATE: 60 BPM | SYSTOLIC BLOOD PRESSURE: 140 MMHG | RESPIRATION RATE: 16 BRPM | WEIGHT: 150.8 LBS | TEMPERATURE: 97.7 F | BODY MASS INDEX: 25.74 KG/M2 | DIASTOLIC BLOOD PRESSURE: 70 MMHG

## 2019-03-04 DIAGNOSIS — E03.9 HYPOTHYROIDISM: ICD-10-CM

## 2019-03-04 DIAGNOSIS — E03.9 HYPOTHYROIDISM, UNSPECIFIED TYPE: Primary | ICD-10-CM

## 2019-03-04 DIAGNOSIS — I10 HYPERTENSION GOAL BP (BLOOD PRESSURE) < 140/90: ICD-10-CM

## 2019-03-04 LAB — TSH SERPL DL<=0.005 MIU/L-ACNC: 2.73 MU/L (ref 0.4–4)

## 2019-03-04 PROCEDURE — 36415 COLL VENOUS BLD VENIPUNCTURE: CPT | Performed by: FAMILY MEDICINE

## 2019-03-04 PROCEDURE — 84443 ASSAY THYROID STIM HORMONE: CPT | Performed by: FAMILY MEDICINE

## 2019-03-04 PROCEDURE — 99214 OFFICE O/P EST MOD 30 MIN: CPT | Performed by: FAMILY MEDICINE

## 2019-03-04 RX ORDER — LEVOTHYROXINE SODIUM 50 UG/1
50 TABLET ORAL DAILY
Qty: 90 TABLET | Refills: 3 | Status: SHIPPED | OUTPATIENT
Start: 2019-03-04 | End: 2021-05-20

## 2019-03-04 RX ORDER — LEVOTHYROXINE SODIUM 50 UG/1
50 TABLET ORAL DAILY
Qty: 30 TABLET | Refills: 0 | Status: CANCELLED | OUTPATIENT
Start: 2019-03-04

## 2019-03-04 ASSESSMENT — MIFFLIN-ST. JEOR: SCORE: 1146.08

## 2019-03-04 ASSESSMENT — PAIN SCALES - GENERAL: PAINLEVEL: NO PAIN (0)

## 2019-03-04 NOTE — PROGRESS NOTES
"  SUBJECTIVE:   Catherine Salgado is a 79 year old female who presents to clinic today for the following health issues:    Hypothyroidism Follow-up      Since last visit, patient describes the following symptoms: Some weight loss 3 lbs,  A few dry patches, hair loss and constipation      Amount of exercise or physical activity: 6-7 days/week for an average of 15-30 minutes    Problems taking medications regularly: No    Medication side effects: none   Does get some hot flashes off and on    Diet: regular (no restrictions)        S: Catherine Salgado is a 79 year old female with hypothyroid.  Is frustrated, feels like it's been low for a few years, but was told it was normal on previous labs in 2017,  Recently, found to have TSH of 12 with low normal T4 of 0.86.     Put on 50mcg of thyroid over a month ago, would like to recheck.     Also with htn.  High here, but normal/borderline at home.  Checks regularly.  Not interested in medication, understands BP control, if high, is important in long run    Problem list, med list, additional histories reviewed and updated, as indicated.      O:/70 (BP Location: Right arm, Patient Position: Sitting)   Pulse 60   Temp 97.7  F (36.5  C) (Tympanic)   Resp 16   Ht 1.613 m (5' 3.5\")   Wt 68.4 kg (150 lb 12.8 oz)   BMI 26.29 kg/m    GEN: Alert and oriented, in no acute distress  CV: RRR, no murmur  EXT: no edema or lesions noted in lower extremities    A: hypothyroid, recheck       Htn, borderline    P: follow BP for now.  Consider medication if increasing.    Will recheck thyroid, it's a b it early, but it should be OK given mild abnormalities previouisly.      Will await refills on synthroid until we see what's what.     F/u depends on results.    "

## 2019-03-04 NOTE — NURSING NOTE
"Chief Complaint   Patient presents with     Establish Care     Thyroid Problem       Initial BP (!) 162/100 (BP Location: Right arm, Patient Position: Sitting)   Pulse 60   Temp 97.7  F (36.5  C) (Tympanic)   Resp 16   Ht 1.613 m (5' 3.5\")   Wt 68.4 kg (150 lb 12.8 oz)   BMI 26.29 kg/m   Estimated body mass index is 26.29 kg/m  as calculated from the following:    Height as of this encounter: 1.613 m (5' 3.5\").    Weight as of this encounter: 68.4 kg (150 lb 12.8 oz).    Patient presents to the clinic using No DME    Health Maintenance that is potentially due pending provider review:  NONE    n/a    Is there anyone who you would like to be able to receive your results? No  If yes have patient fill out SIMIN    Gaye Jordan CMA    "

## 2019-04-05 ENCOUNTER — OFFICE VISIT (OUTPATIENT)
Dept: FAMILY MEDICINE | Facility: CLINIC | Age: 79
End: 2019-04-05
Payer: MEDICARE

## 2019-04-05 VITALS
DIASTOLIC BLOOD PRESSURE: 82 MMHG | OXYGEN SATURATION: 90 % | WEIGHT: 149.8 LBS | HEIGHT: 64 IN | RESPIRATION RATE: 12 BRPM | BODY MASS INDEX: 25.57 KG/M2 | SYSTOLIC BLOOD PRESSURE: 120 MMHG | TEMPERATURE: 99 F | HEART RATE: 74 BPM

## 2019-04-05 DIAGNOSIS — R05.9 COUGH: Primary | ICD-10-CM

## 2019-04-05 DIAGNOSIS — J01.90 ACUTE SINUSITIS WITH SYMPTOMS > 10 DAYS: ICD-10-CM

## 2019-04-05 LAB
FLUAV+FLUBV AG SPEC QL: NEGATIVE
FLUAV+FLUBV AG SPEC QL: NEGATIVE
SPECIMEN SOURCE: NORMAL

## 2019-04-05 PROCEDURE — 99213 OFFICE O/P EST LOW 20 MIN: CPT | Performed by: FAMILY MEDICINE

## 2019-04-05 PROCEDURE — 87804 INFLUENZA ASSAY W/OPTIC: CPT | Performed by: FAMILY MEDICINE

## 2019-04-05 RX ORDER — AZITHROMYCIN 250 MG/1
TABLET, FILM COATED ORAL
Qty: 6 TABLET | Refills: 1 | Status: SHIPPED | OUTPATIENT
Start: 2019-04-05 | End: 2019-05-20

## 2019-04-05 ASSESSMENT — MIFFLIN-ST. JEOR: SCORE: 1141.55

## 2019-04-05 NOTE — PROGRESS NOTES
SUBJECTIVE:                                                    Catherine Salgado is 79 year old female   Chief Complaint   Patient presents with     Sinus Problem     Symptoms for over one week. States nasal congestion, sinus pressure/congestion, wheezing, somewhat productive cough, white phlegm, temp of 99 F wednesday night. Has tried nyquil, dayquil, theraflu, to little effect. States thatshe has the flu.         Problem list and histories reviewed & adjusted, as indicated.  Additional history: as documented    Patient Active Problem List   Diagnosis     Hypertension goal BP (blood pressure) < 140/90     Macular degeneration (senile) of retina     Hyperlipidemia     Phlebitis and thrombophlebitis of other deep vessels of lower extremities     Bunion     Generalized osteoarthrosis, unspecified site     HYPERLIPIDEMIA LDL GOAL <130     Advanced directives, counseling/discussion     Hypothyroidism     Cerebral infarction (H)     Health Care Home     Past Surgical History:   Procedure Laterality Date     COLONOSCOPY  5/27/2005    Diverticulosis     SURGICAL HISTORY OF -       T&A     SURGICAL HISTORY OF -   10/1976    Vaginal hysterectomy, A & P repair       Social History     Tobacco Use     Smoking status: Current Some Day Smoker     Packs/day: 0.25     Years: 40.00     Pack years: 10.00     Types: Cigarettes     Smokeless tobacco: Never Used   Substance Use Topics     Alcohol use: Yes     Comment: rare     Family History   Problem Relation Age of Onset     Cerebrovascular Disease Mother         bianca JULIO Father      Cancer Father         kidney CA     Cardiovascular Sister         cardiac arrest     Musculoskeletal Disorder Daughter         rotator cuff, knee repair     Diabetes Sister      Musculoskeletal Disorder Daughter         ankle and arm injuries     Musculoskeletal Disorder Daughter         Knees     Hypertension Sister          Current Outpatient Medications   Medication Sig Dispense  Refill     Ascorbic Acid (VITAMIN C PO) Take 1,000 mg by mouth daily sometimes       aspirin 81 MG tablet Take 162 mg by mouth every evening        azithromycin (ZITHROMAX) 250 MG tablet Two tablets first day, then one tablet daily for four days. 6 tablet 1     calcium carbonate-vitamin D (OS-ANDREY) 600-400 MG-UNIT chewable tablet Take 2 chew tab by mouth daily       Cholecalciferol (VITAMIN D-3) 5000 UNIT TABS Take 1 tablet by mouth daily.       Coenzyme Q10 (COQ-10) 100 MG CAPS Take by mouth daily       EPINEPHrine (ADRENACLICK) 0.3 MG/0.3ML injection 2-pack Inject 0.3 mLs (0.3 mg) into the muscle as needed for anaphylaxis 0.6 mL 0     Ferrous Fumarate (IRON) 18 MG TBCR Take by mouth three times a week       Garlic 500 MG TABS        levothyroxine (SYNTHROID/LEVOTHROID) 50 MCG tablet Take 1 tablet (50 mcg) by mouth daily 90 tablet 3     Lutein 20 MG CAPS        Selenium 200 MCG CAPS Take by mouth daily       vitamin B complex with vitamin C (VITAMIN  B COMPLEX) TABS tablet Take 1 tablet by mouth daily       estradiol (ESTRACE) 0.5 MG tablet Place 1 tab vaginally at bedtime twice a week (Patient not taking: Reported on 3/4/2019) 24 tablet 3     Allergies   Allergen Reactions     Wasps [Hornets] Anaphylaxis     Passes out     Gadavist [Gadobutrol] Other (See Comments)     Did ok if benadryl is given.      Ivp Dye [Contrast Dye] Other (See Comments)     Severe arthritic reaction     Penicillins Hives     Recent Labs   Lab Test 03/04/19  1007 02/01/19  1019 07/01/17  0920  04/14/16  0837 12/30/15  1808 12/22/15  0854 11/01/15  0815  07/13/15  1150   A1C  --   --   --   --   --   --   --  6.0  --   --    LDL  --   --   --   --  131*  --  51 145*  --   --    HDL  --   --   --   --  56  --  63 51  --   --    TRIG  --   --   --   --  137  --  81 124  --   --    ALT  --   --  20  --   --   --   --  19  --  22   CR  --   --  0.80  --   --  0.79  --  0.73   < > 0.78   GFRESTIMATED  --   --  70  --   --  71  --  78   < > 72  "  GFRESTBLACK  --   --  85  --   --  87  --  >90   GFR Calc     < > 87   POTASSIUM  --   --  4.0  --   --  3.8  --  3.7   < > 3.8   TSH 2.73 12.69*  --    < > 2.57  --   --  2.49  --  3.29    < > = values in this interval not displayed.      BP Readings from Last 3 Encounters:   04/05/19 120/82   03/04/19 140/70   02/01/19 146/85    Wt Readings from Last 3 Encounters:   04/05/19 67.9 kg (149 lb 12.8 oz)   03/04/19 68.4 kg (150 lb 12.8 oz)   02/01/19 69.4 kg (153 lb)         ROS:  Constitutional, HEENT, cardiovascular, pulmonary, gi and gu systems are negative, except as otherwise noted.    OBJECTIVE:                                                    /82   Pulse 74   Temp 99  F (37.2  C) (Tympanic)   Resp 12   Ht 1.613 m (5' 3.5\")   Wt 67.9 kg (149 lb 12.8 oz)   SpO2 90%   BMI 26.12 kg/m    GENERAL APPEARANCE ADULT: alert, appears ill, no distress  HENT: right TM abnormal, dull, left TM normal, throat/mouth:moderate erythema, marked erythema, mucous membranes moist,  cobblestoning and thick mucous posterior pharynx.  RESP: lungs clear to auscultation   CV: normal rate, regular rhythm, no murmur or gallop  Diagnostic Test Results:  pending     ASSESSMENT/PLAN:                                                    1. Cough  Viral vs bacterial.  Trial of antibiotics but if not effective viral infection and self limiting.  If effective and recurs will repeat, see patient instructions.;  - Influenza A/B antigen  - azithromycin (ZITHROMAX) 250 MG tablet; Two tablets first day, then one tablet daily for four days.  Dispense: 6 tablet; Refill: 1    2. Acute sinusitis with symptoms > 10 days  - azithromycin (ZITHROMAX) 250 MG tablet; Two tablets first day, then one tablet daily for four days.  Dispense: 6 tablet; Refill: 1    Mariana Welsh MD  [facility]        "

## 2019-04-05 NOTE — NURSING NOTE
"Initial /82   Pulse 74   Temp 99  F (37.2  C) (Tympanic)   Resp 12   Ht 1.613 m (5' 3.5\")   Wt 67.9 kg (149 lb 12.8 oz)   SpO2 90%   BMI 26.12 kg/m   Estimated body mass index is 26.12 kg/m  as calculated from the following:    Height as of this encounter: 1.613 m (5' 3.5\").    Weight as of this encounter: 67.9 kg (149 lb 12.8 oz). .          "

## 2019-05-20 ENCOUNTER — HOSPITAL ENCOUNTER (EMERGENCY)
Facility: CLINIC | Age: 79
Discharge: HOME | End: 2019-05-20
Attending: PHYSICIAN ASSISTANT | Admitting: PHYSICIAN ASSISTANT
Payer: MEDICARE

## 2019-05-20 ENCOUNTER — APPOINTMENT (OUTPATIENT)
Dept: ULTRASOUND IMAGING | Facility: CLINIC | Age: 79
End: 2019-05-20
Attending: EMERGENCY MEDICINE
Payer: MEDICARE

## 2019-05-20 VITALS
BODY MASS INDEX: 29.12 KG/M2 | OXYGEN SATURATION: 95 % | DIASTOLIC BLOOD PRESSURE: 77 MMHG | HEART RATE: 62 BPM | RESPIRATION RATE: 16 BRPM | WEIGHT: 167 LBS | TEMPERATURE: 98 F | SYSTOLIC BLOOD PRESSURE: 157 MMHG

## 2019-05-20 DIAGNOSIS — R10.11 ABDOMINAL PAIN, RIGHT UPPER QUADRANT: ICD-10-CM

## 2019-05-20 LAB
ALBUMIN SERPL-MCNC: 3.7 G/DL (ref 3.4–5)
ALBUMIN UR-MCNC: NEGATIVE MG/DL
ALP SERPL-CCNC: 47 U/L (ref 40–150)
ALT SERPL W P-5'-P-CCNC: 21 U/L (ref 0–50)
ANION GAP SERPL CALCULATED.3IONS-SCNC: 3 MMOL/L (ref 3–14)
APPEARANCE UR: CLEAR
AST SERPL W P-5'-P-CCNC: 23 U/L (ref 0–45)
BACTERIA #/AREA URNS HPF: ABNORMAL /HPF
BASOPHILS # BLD AUTO: 0 10E9/L (ref 0–0.2)
BASOPHILS NFR BLD AUTO: 0.6 %
BILIRUB SERPL-MCNC: 0.6 MG/DL (ref 0.2–1.3)
BILIRUB UR QL STRIP: NEGATIVE
BUN SERPL-MCNC: 15 MG/DL (ref 7–30)
CALCIUM SERPL-MCNC: 9.6 MG/DL (ref 8.5–10.1)
CHLORIDE SERPL-SCNC: 108 MMOL/L (ref 94–109)
CO2 SERPL-SCNC: 30 MMOL/L (ref 20–32)
COLOR UR AUTO: YELLOW
CREAT SERPL-MCNC: 0.76 MG/DL (ref 0.52–1.04)
DIFFERENTIAL METHOD BLD: ABNORMAL
EOSINOPHIL # BLD AUTO: 0.3 10E9/L (ref 0–0.7)
EOSINOPHIL NFR BLD AUTO: 3.9 %
ERYTHROCYTE [DISTWIDTH] IN BLOOD BY AUTOMATED COUNT: 15.3 % (ref 10–15)
GFR SERPL CREATININE-BSD FRML MDRD: 76 ML/MIN/{1.73_M2}
GLUCOSE SERPL-MCNC: 82 MG/DL (ref 70–99)
GLUCOSE UR STRIP-MCNC: NEGATIVE MG/DL
HCT VFR BLD AUTO: 45.4 % (ref 35–47)
HGB BLD-MCNC: 14.4 G/DL (ref 11.7–15.7)
HGB UR QL STRIP: NEGATIVE
IMM GRANULOCYTES # BLD: 0 10E9/L (ref 0–0.4)
IMM GRANULOCYTES NFR BLD: 0.3 %
KETONES UR STRIP-MCNC: NEGATIVE MG/DL
LEUKOCYTE ESTERASE UR QL STRIP: ABNORMAL
LIPASE SERPL-CCNC: 152 U/L (ref 73–393)
LYMPHOCYTES # BLD AUTO: 2.2 10E9/L (ref 0.8–5.3)
LYMPHOCYTES NFR BLD AUTO: 32 %
MCH RBC QN AUTO: 30.1 PG (ref 26.5–33)
MCHC RBC AUTO-ENTMCNC: 31.7 G/DL (ref 31.5–36.5)
MCV RBC AUTO: 95 FL (ref 78–100)
MONOCYTES # BLD AUTO: 0.5 10E9/L (ref 0–1.3)
MONOCYTES NFR BLD AUTO: 7.4 %
NEUTROPHILS # BLD AUTO: 3.9 10E9/L (ref 1.6–8.3)
NEUTROPHILS NFR BLD AUTO: 55.8 %
NITRATE UR QL: NEGATIVE
NRBC # BLD AUTO: 0 10*3/UL
NRBC BLD AUTO-RTO: 0 /100
PH UR STRIP: 7 PH (ref 5–7)
PLATELET # BLD AUTO: 244 10E9/L (ref 150–450)
POTASSIUM SERPL-SCNC: 4.1 MMOL/L (ref 3.4–5.3)
PROT SERPL-MCNC: 7.6 G/DL (ref 6.8–8.8)
RBC # BLD AUTO: 4.78 10E12/L (ref 3.8–5.2)
RBC #/AREA URNS AUTO: 1 /HPF (ref 0–2)
SODIUM SERPL-SCNC: 141 MMOL/L (ref 133–144)
SOURCE: ABNORMAL
SP GR UR STRIP: 1.01 (ref 1–1.03)
SQUAMOUS #/AREA URNS AUTO: 1 /HPF (ref 0–1)
UROBILINOGEN UR STRIP-MCNC: 0 MG/DL (ref 0–2)
WBC # BLD AUTO: 6.9 10E9/L (ref 4–11)
WBC #/AREA URNS AUTO: 6 /HPF (ref 0–5)

## 2019-05-20 PROCEDURE — 85025 COMPLETE CBC W/AUTO DIFF WBC: CPT | Performed by: EMERGENCY MEDICINE

## 2019-05-20 PROCEDURE — 80053 COMPREHEN METABOLIC PANEL: CPT | Performed by: EMERGENCY MEDICINE

## 2019-05-20 PROCEDURE — 99284 EMERGENCY DEPT VISIT MOD MDM: CPT | Mod: Z6 | Performed by: EMERGENCY MEDICINE

## 2019-05-20 PROCEDURE — 83690 ASSAY OF LIPASE: CPT | Performed by: EMERGENCY MEDICINE

## 2019-05-20 PROCEDURE — 99284 EMERGENCY DEPT VISIT MOD MDM: CPT | Mod: 25

## 2019-05-20 PROCEDURE — 81001 URINALYSIS AUTO W/SCOPE: CPT | Performed by: EMERGENCY MEDICINE

## 2019-05-20 PROCEDURE — 76705 ECHO EXAM OF ABDOMEN: CPT

## 2019-05-20 RX ORDER — VALACYCLOVIR HYDROCHLORIDE 1 G/1
1000 TABLET, FILM COATED ORAL 3 TIMES DAILY
Qty: 21 TABLET | Refills: 0 | Status: SHIPPED | OUTPATIENT
Start: 2019-05-20 | End: 2019-06-18

## 2019-05-20 ASSESSMENT — ENCOUNTER SYMPTOMS
VOMITING: 0
CARDIOVASCULAR NEGATIVE: 1
RECTAL PAIN: 0
DIARRHEA: 0
SHORTNESS OF BREATH: 0
CONSTIPATION: 0
ABDOMINAL DISTENTION: 0
NAUSEA: 1
CHEST TIGHTNESS: 0
FEVER: 0
FATIGUE: 0
RESPIRATORY NEGATIVE: 1
WHEEZING: 0
BLOOD IN STOOL: 0
WOUND: 0
ABDOMINAL PAIN: 1
PALPITATIONS: 0
COUGH: 0
NEUROLOGICAL NEGATIVE: 1
BACK PAIN: 0

## 2019-05-20 NOTE — ED AVS SNAPSHOT
[facility] Emergency Department  [address] [address]  Phone:  [phone]  Fax:  [phone]                                    Catherine Salgado   MRN: [id number]    Department:  [facility] Emergency Department   Date of Visit:  5/20/2019           After Visit Summary Signature Page    I have received my discharge instructions, and my questions have been answered. I have discussed any challenges I see with this plan with the nurse or doctor.    ..........................................................................................................................................  Patient/Patient Representative Signature      ..........................................................................................................................................  Patient Representative Print Name and Relationship to Patient    ..................................................               ................................................  Date                                   Time    ..........................................................................................................................................  Reviewed by Signature/Title    ...................................................              ..............................................  Date                                               Time          22EPIC Rev 08/18

## 2019-05-20 NOTE — DISCHARGE INSTRUCTIONS
Tylenol for discomfort, Valtrex as prescribed    Return for worsening pain, fever, vomiting or any other concerns

## 2019-05-20 NOTE — ED PROVIDER NOTES
History     Chief Complaint   Patient presents with     Shingles     upper side pain that wraps around onto back. started 2 days ago     HPI  Catherine Salgado is a 79 year old female w/hx of stroke presents to the ED after being seen in  complaining of RUQ abdominal pain starting 2 days ago. The patient describes the pain as a superficial and deep RUQ/pain under the right rib as a burning pain. She notes this pain radiates to the right flank as well. She occasionally has had nausea secondary to the pain and the only thing that helps relieve the pain is ice. The patient has taken tylenol for the pain to no pain relief. Her pain is not worse after eating, but worse with palpating the area and bending forward. Patient had a CT chest in 2017 that noted she had a few gallstones at that time. She denies rash, dysuria, frequency, urgency, constipation, fever, and diarrhea. For the past few weeks she has also had some lower back pain that radiates to her bottom, but she is not concerned about this currently. Patient takes aspirin daily, but denies chronic OTC pain medication use.    Social history: Patient used to work as a LPN and mail person.    Allergies:  Allergies   Allergen Reactions     Wasps [Hornets] Anaphylaxis     Passes out     Gadavist [Gadobutrol] Other (See Comments)     Did ok if benadryl is given.      Ivp Dye [Contrast Dye] Other (See Comments)     Severe arthritic reaction     Penicillins Hives       Problem List:    Patient Active Problem List    Diagnosis Date Noted     Health Care Home 12/31/2015     Priority: Medium     *See Letters for [facility] Care Plan: My Access Plan         Cerebral infarction (H) 10/31/2015     Priority: Medium     Diagnosis updated by automated process. Provider to review and confirm.       Hypothyroidism 08/14/2013     Priority: Medium     Advanced directives, counseling/discussion 06/26/2013     Priority: Medium     Patient does not have an Advance/Health Care Directive  ([facility]), declines information/referral.    Fern Brionna  June 26, 2013         HYPERLIPIDEMIA LDL GOAL <130 10/31/2010     Priority: Medium     Hypertension goal BP (blood pressure) < 140/90      Priority: Medium     dx date - ?  CV risk factors previous smoker, family history and lipids  FH positive for diabetes mellitus and cardiovascular disease  history of renal disease negative       Macular degeneration (senile) of retina      Priority: Medium     Problem list name updated by automated process. Provider to review       Hyperlipidemia      Priority: Medium     CHOL      282   03/11/2005  LDL      177   03/11/2005  HDL       35   03/11/2005  Problem list name updated by automated process. Provider to review       Phlebitis and thrombophlebitis of other deep vessels of lower extremities      Priority: Medium     DVT on OCP       Bunion      Priority: Medium     Bilateral       Generalized osteoarthrosis, unspecified site      Priority: Medium     Cervical          Past Medical History:    Past Medical History:   Diagnosis Date     Bunion      Chest pain, unspecified      Generalized osteoarthrosis, unspecified site      Macular degeneration (senile) of retina, unspecified      Other and unspecified hyperlipidemia      Phlebitis and thrombophlebitis of other deep vessels of lower extremities      TOBACCO ABUSE-CONTINUOUS      Unspecified essential hypertension        Past Surgical History:    Past Surgical History:   Procedure Laterality Date     COLONOSCOPY  5/27/2005    Diverticulosis     SURGICAL HISTORY OF -       T&A     SURGICAL HISTORY OF -   10/1976    Vaginal hysterectomy, A & P repair       Family History:    Family History   Problem Relation Age of Onset     Cerebrovascular Disease Mother         bianca JULIO Father      Cancer Father         kidney CA     Cardiovascular Sister         cardiac arrest     Musculoskeletal Disorder Daughter         rotator cuff, knee repair     Diabetes  Sister      Musculoskeletal Disorder Daughter         ankle and arm injuries     Musculoskeletal Disorder Daughter         Knees     Hypertension Sister        Social History:  Marital Status:   [2]  Social History     Tobacco Use     Smoking status: Current Some Day Smoker     Packs/day: 0.25     Years: 40.00     Pack years: 10.00     Types: Cigarettes     Smokeless tobacco: Never Used   Substance Use Topics     Alcohol use: Yes     Comment: rare     Drug use: No        Medications:      Ascorbic Acid (VITAMIN C PO)   aspirin 81 MG tablet   azithromycin (ZITHROMAX) 250 MG tablet   calcium carbonate-vitamin D (OS-ANDREY) 600-400 MG-UNIT chewable tablet   Cholecalciferol (VITAMIN D-3) 5000 UNIT TABS   Coenzyme Q10 (COQ-10) 100 MG CAPS   EPINEPHrine (ADRENACLICK) 0.3 MG/0.3ML injection 2-pack   estradiol (ESTRACE) 0.5 MG tablet   Ferrous Fumarate (IRON) 18 MG TBCR   Garlic 500 MG TABS   levothyroxine (SYNTHROID/LEVOTHROID) 50 MCG tablet   Lutein 20 MG CAPS   Selenium 200 MCG CAPS   vitamin B complex with vitamin C (VITAMIN  B COMPLEX) TABS tablet         Review of Systems    Physical Exam   BP: (!) 185/128(pt chooses not to take medication for high BP)  Pulse: 64  Temp: 98  F (36.7  C)  Resp: 16  Weight: 75.8 kg (167 lb)  SpO2: 95 %      Physical Exam    ED Course        Procedures            Critical Care time:  none         Results for orders placed or performed during the hospital encounter of 05/20/19 (from the past 24 hour(s))   CBC with platelets differential   Result Value Ref Range    WBC 6.9 4.0 - 11.0 10e9/L    RBC Count 4.78 3.8 - 5.2 10e12/L    Hemoglobin 14.4 11.7 - 15.7 g/dL    Hematocrit 45.4 35.0 - 47.0 %    MCV 95 78 - 100 fl    MCH 30.1 26.5 - 33.0 pg    MCHC 31.7 31.5 - 36.5 g/dL    RDW 15.3 (H) 10.0 - 15.0 %    Platelet Count 244 150 - 450 10e9/L    Diff Method Automated Method     % Neutrophils 55.8 %    % Lymphocytes 32.0 %    % Monocytes 7.4 %    % Eosinophils 3.9 %    % Basophils 0.6 %    %  Immature Granulocytes 0.3 %    Nucleated RBCs 0 0 /100    Absolute Neutrophil 3.9 1.6 - 8.3 10e9/L    Absolute Lymphocytes 2.2 0.8 - 5.3 10e9/L    Absolute Monocytes 0.5 0.0 - 1.3 10e9/L    Absolute Eosinophils 0.3 0.0 - 0.7 10e9/L    Absolute Basophils 0.0 0.0 - 0.2 10e9/L    Abs Immature Granulocytes 0.0 0 - 0.4 10e9/L    Absolute Nucleated RBC 0.0    Comprehensive metabolic panel   Result Value Ref Range    Sodium 141 133 - 144 mmol/L    Potassium 4.1 3.4 - 5.3 mmol/L    Chloride 108 94 - 109 mmol/L    Carbon Dioxide 30 20 - 32 mmol/L    Anion Gap 3 3 - 14 mmol/L    Glucose 82 70 - 99 mg/dL    Urea Nitrogen 15 7 - 30 mg/dL    Creatinine 0.76 0.52 - 1.04 mg/dL    GFR Estimate 76 >60 mL/min/[1.73_m2]    GFR Estimate If Black 88 >60 mL/min/[1.73_m2]    Calcium 9.6 8.5 - 10.1 mg/dL    Bilirubin Total 0.6 0.2 - 1.3 mg/dL    Albumin 3.7 3.4 - 5.0 g/dL    Protein Total 7.6 6.8 - 8.8 g/dL    Alkaline Phosphatase 47 40 - 150 U/L    ALT 21 0 - 50 U/L    AST 23 0 - 45 U/L   Lipase   Result Value Ref Range    Lipase 152 73 - 393 U/L       Medications - No data to display    Assessments & Plan (with Medical Decision Making)     I have reviewed the nursing notes.    I have reviewed the findings, diagnosis, plan and need for follow up with the patient.       Medication List      There are no discharge medications for this visit.         Final diagnoses:   None     This document serves as a record of services personally performed by Ranulfo Ramirez MD. It was created on their behalf by Jo Ann Dewey, a trained medical scribe. The creation of this record is based on the provider's personal observations and the statements of the patient. This document has been checked and approved by the attending provider.    Disclaimer: This note consists of symbols derived from keyboarding, dictation and/or voice recognition software. As a result, there may be errors in the script that have gone undetected. Please consider this when  interpreting information found in this chart.    5/20/2019   [facility] EMERGENCY [facility]

## 2019-05-20 NOTE — ED PROVIDER NOTES
History     Chief Complaint   Patient presents with     Shingles     upper side pain that wraps around onto back. started 2 days ago     HPI  Catherine Salgado is a 79 year old female who presents the urgent care with right upper quadrant abdominal pain that started 2 days ago.  Patient states the pain is superficial along the skin as well as deep and starts just below the ribs in the right upper quadrant and wraps around to the flank.  Patient states the pain occasionally causes nausea because it is so sharp and stabbing.  Patient concerned that she might have shingles, but denies any rash.  Patient denies fever, recent illness, cough, bloody or black tarry stools, diarrhea, vomiting, constipation, urinary symptoms, hematuria, chest pain, or shortness of breath.  Patient is also been having some back pain for the past few weeks with radiation of pain into the right buttocks.  Patient states this is been a an ongoing issue for a few weeks and nothing worsened since this new pain started 2 days ago.  Patient states she been taking Tylenol with no relief of symptoms.  Patient has been using ice which seemed to improve symptoms some.  Pain worsens with palpation to the area or bending forward.    Allergies:  Allergies   Allergen Reactions     Wasps [Hornets] Anaphylaxis     Passes out     Gadavist [Gadobutrol] Other (See Comments)     Did ok if benadryl is given.      Ivp Dye [Contrast Dye] Other (See Comments)     Severe arthritic reaction     Penicillins Hives       Problem List:    Patient Active Problem List    Diagnosis Date Noted     Health Care Home 12/31/2015     Priority: Medium     *See Letters for [facility] Care Plan: My Access Plan         Cerebral infarction (H) 10/31/2015     Priority: Medium     Diagnosis updated by automated process. Provider to review and confirm.       Hypothyroidism 08/14/2013     Priority: Medium     Advanced directives, counseling/discussion 06/26/2013     Priority: Medium     Patient  does not have an Advance/Health Care Directive (HCD), declines information/referral.    Fern Killianlynon  June 26, 2013         HYPERLIPIDEMIA LDL GOAL <130 10/31/2010     Priority: Medium     Hypertension goal BP (blood pressure) < 140/90      Priority: Medium     dx date - ?  CV risk factors previous smoker, family history and lipids  FH positive for diabetes mellitus and cardiovascular disease  history of renal disease negative       Macular degeneration (senile) of retina      Priority: Medium     Problem list name updated by automated process. Provider to review       Hyperlipidemia      Priority: Medium     CHOL      282   03/11/2005  LDL      177   03/11/2005  HDL       35   03/11/2005  Problem list name updated by automated process. Provider to review       Phlebitis and thrombophlebitis of other deep vessels of lower extremities      Priority: Medium     DVT on OCP       Bunion      Priority: Medium     Bilateral       Generalized osteoarthrosis, unspecified site      Priority: Medium     Cervical          Past Medical History:    Past Medical History:   Diagnosis Date     Bunion      Chest pain, unspecified      Generalized osteoarthrosis, unspecified site      Macular degeneration (senile) of retina, unspecified      Other and unspecified hyperlipidemia      Phlebitis and thrombophlebitis of other deep vessels of lower extremities      TOBACCO ABUSE-CONTINUOUS      Unspecified essential hypertension        Past Surgical History:    Past Surgical History:   Procedure Laterality Date     COLONOSCOPY  5/27/2005    Diverticulosis     SURGICAL HISTORY OF -       T&A     SURGICAL HISTORY OF -   10/1976    Vaginal hysterectomy, A & P repair       Family History:    Family History   Problem Relation Age of Onset     Cerebrovascular Disease Mother         bianca JULIO Father      Cancer Father         kidney CA     Cardiovascular Sister         cardiac arrest     Musculoskeletal Disorder Daughter          rotator cuff, knee repair     Diabetes Sister      Musculoskeletal Disorder Daughter         ankle and arm injuries     Musculoskeletal Disorder Daughter         Knees     Hypertension Sister        Social History:  Marital Status:   [2]  Social History     Tobacco Use     Smoking status: Current Some Day Smoker     Packs/day: 0.25     Years: 40.00     Pack years: 10.00     Types: Cigarettes     Smokeless tobacco: Never Used   Substance Use Topics     Alcohol use: Yes     Comment: rare     Drug use: No        Medications:      Ascorbic Acid (VITAMIN C PO)   aspirin 81 MG tablet   azithromycin (ZITHROMAX) 250 MG tablet   calcium carbonate-vitamin D (OS-ANDREY) 600-400 MG-UNIT chewable tablet   Cholecalciferol (VITAMIN D-3) 5000 UNIT TABS   Coenzyme Q10 (COQ-10) 100 MG CAPS   EPINEPHrine (ADRENACLICK) 0.3 MG/0.3ML injection 2-pack   estradiol (ESTRACE) 0.5 MG tablet   Ferrous Fumarate (IRON) 18 MG TBCR   Garlic 500 MG TABS   levothyroxine (SYNTHROID/LEVOTHROID) 50 MCG tablet   Lutein 20 MG CAPS   Selenium 200 MCG CAPS   vitamin B complex with vitamin C (VITAMIN  B COMPLEX) TABS tablet         Review of Systems   Constitutional: Negative for fatigue and fever.   HENT: Negative.    Respiratory: Negative.  Negative for cough, chest tightness, shortness of breath and wheezing.    Cardiovascular: Negative.  Negative for chest pain and palpitations.   Gastrointestinal: Positive for abdominal pain (right upper quadrant ) and nausea. Negative for abdominal distention, blood in stool, constipation, diarrhea, rectal pain and vomiting.   Genitourinary: Negative.    Musculoskeletal: Negative for back pain.   Skin: Negative for rash and wound.   Neurological: Negative.    All other systems reviewed and are negative.      Physical Exam   BP: (!) 185/128(pt chooses not to take medication for high BP)  Pulse: 64  Temp: 98  F (36.7  C)  Resp: 16  Weight: 75.8 kg (167 lb)  SpO2: 95 %      Physical Exam   Constitutional: She  is oriented to person, place, and time. She appears well-developed and well-nourished. No distress.   Cardiovascular: Normal rate, regular rhythm and normal heart sounds.   No murmur heard.  Pulmonary/Chest: Effort normal and breath sounds normal.   No tenderness with palpation to the chest. No CVA tenderness bilaterally.    Abdominal: Soft. Bowel sounds are normal. She exhibits no distension and no mass. There is tenderness (right upper quadrant ). There is no rebound and no guarding. No hernia.   Musculoskeletal: Normal range of motion.   Neurological: She is alert and oriented to person, place, and time.   Skin: Skin is warm. No rash noted. She is not diaphoretic. No erythema.   Psychiatric: She has a normal mood and affect. Her behavior is normal. Thought content normal.   Nursing note and vitals reviewed.      ED Course        Procedures              Critical Care time:  none               No results found for this or any previous visit (from the past 24 hour(s)).    Medications - No data to display    Assessments & Plan (with Medical Decision Making)     I have reviewed the nursing notes.    I have reviewed the findings, diagnosis, plan and need for follow up with the patient.   Discussed patient case with Dr. Ranulfo Ramirez emergency department who agreed to take patient in the emergency department for abdominal work-up.  Patient transferred over to the ED for further evaluation and treatment from the urgent care department in stable condition.       Medication List      There are no discharge medications for this visit.         Final diagnoses:   None       5/20/2019   [facility] EMERGENCY DEPARTMENT     Yelena Michaels PA-C  05/20/19 2402

## 2019-06-14 ENCOUNTER — OFFICE VISIT (OUTPATIENT)
Dept: FAMILY MEDICINE | Facility: CLINIC | Age: 79
End: 2019-06-14
Payer: MEDICARE

## 2019-06-14 VITALS
RESPIRATION RATE: 18 BRPM | OXYGEN SATURATION: 97 % | TEMPERATURE: 98 F | SYSTOLIC BLOOD PRESSURE: 123 MMHG | HEIGHT: 64 IN | WEIGHT: 152.44 LBS | DIASTOLIC BLOOD PRESSURE: 80 MMHG | BODY MASS INDEX: 26.03 KG/M2 | HEART RATE: 63 BPM

## 2019-06-14 DIAGNOSIS — M54.41 ACUTE RIGHT-SIDED LOW BACK PAIN WITH RIGHT-SIDED SCIATICA: Primary | ICD-10-CM

## 2019-06-14 PROCEDURE — 99213 OFFICE O/P EST LOW 20 MIN: CPT | Performed by: NURSE PRACTITIONER

## 2019-06-14 RX ORDER — TIZANIDINE 2 MG/1
2 TABLET ORAL 3 TIMES DAILY PRN
Qty: 30 TABLET | Refills: 1 | Status: SHIPPED | OUTPATIENT
Start: 2019-06-14 | End: 2021-05-20

## 2019-06-14 ASSESSMENT — MIFFLIN-ST. JEOR: SCORE: 1153.51

## 2019-06-14 NOTE — PROGRESS NOTES
"Subjective     Catherine Salgado is a 79 year old female who presents to clinic today for the following health issues:    HPI   Musculoskeletal problem/pain      Duration: two months    Description  Location: right sided hip, buttocks, leg    Intensity:  Currently:  2/10; Worse:  8/10    Accompanying signs and symptoms: radiation of pain to right buttocks, right leg, tingling and weakness of right leg; patient states she cannot stand at times.    History  Previous similar problem: no   Previous evaluation:  none    Precipitating or alleviating factors:  Trauma or overuse: no   Aggravating factors include: sitting, standing, walking, climbing stairs and exercise    Therapies tried and outcome: Aspercreme with lidocaine - helps patient sleep; Tylenol and Aleve.    Reviewed and updated as needed this visit by Provider  Allergies         Review of Systems   ROS COMP: Constitutional, HEENT, cardiovascular, pulmonary, gi and gu systems are negative, except as otherwise noted.      Objective    /80   Pulse 63   Temp 98  F (36.7  C) (Tympanic)   Resp 18   Ht 1.613 m (5' 3.5\")   Wt 69.1 kg (152 lb 7 oz)   SpO2 97%   BMI 26.58 kg/m    Body mass index is 26.58 kg/m .  Physical Exam   GENERAL: healthy, alert and no distress  MS: no gross musculoskeletal defects noted, no edema  NEURO: Normal strength and tone, mentation intact and speech normal  Comprehensive back pain exam:  Tenderness of right side lumbar are, Pain limits the following motions: standing, turning, Lower extremity sensation normal and equal on both sides and Straight leg positive on  right, indicating possible ipsilateral radiculopathy  PSYCH: mentation appears normal, affect normal/bright    Diagnostic Test Results:  Labs reviewed in Epic        Assessment & Plan     1. Acute right-sided low back pain with right-sided sciatica  -Tylenol 500-1000 mg 3 times daily   -ice packs  -Lidocaine cream   - PHYSICAL THERAPY REFERRAL; Future  - tiZANidine " (ZANAFLEX) 2 MG tablet; Take 1 tablet (2 mg) by mouth 3 times daily as needed for muscle spasms or other (back pain)  Dispense: 30 tablet; Refill: 1       See Patient Instructions    Return in about 2 weeks (around 6/28/2019), or if symptoms worsen or fail to improve.    ROSALEE Huffman [facility] [place]

## 2019-06-14 NOTE — PATIENT INSTRUCTIONS
Sciatica    physical therapy     Tylenol 500-1000 mg 3 times daily     Ice packs as needed     Topical Lidocaine from over the counter     Tizanidine 2 mg 3 times daily as needed for pain (muscle relaxer)

## 2019-06-18 ENCOUNTER — OFFICE VISIT (OUTPATIENT)
Dept: FAMILY MEDICINE | Facility: CLINIC | Age: 79
End: 2019-06-18
Payer: MEDICARE

## 2019-06-18 ENCOUNTER — TELEPHONE (OUTPATIENT)
Dept: FAMILY MEDICINE | Facility: CLINIC | Age: 79
End: 2019-06-18

## 2019-06-18 ENCOUNTER — ANCILLARY PROCEDURE (OUTPATIENT)
Dept: GENERAL RADIOLOGY | Facility: CLINIC | Age: 79
End: 2019-06-18
Attending: FAMILY MEDICINE
Payer: MEDICARE

## 2019-06-18 VITALS
OXYGEN SATURATION: 95 % | HEART RATE: 59 BPM | TEMPERATURE: 97.8 F | HEIGHT: 64 IN | WEIGHT: 152 LBS | SYSTOLIC BLOOD PRESSURE: 126 MMHG | DIASTOLIC BLOOD PRESSURE: 74 MMHG | BODY MASS INDEX: 25.95 KG/M2

## 2019-06-18 DIAGNOSIS — M25.551 HIP PAIN, RIGHT: ICD-10-CM

## 2019-06-18 DIAGNOSIS — M25.551 HIP PAIN, RIGHT: Primary | ICD-10-CM

## 2019-06-18 DIAGNOSIS — M70.61 TROCHANTERIC BURSITIS OF RIGHT HIP: ICD-10-CM

## 2019-06-18 PROCEDURE — 73502 X-RAY EXAM HIP UNI 2-3 VIEWS: CPT

## 2019-06-18 PROCEDURE — 20610 DRAIN/INJ JOINT/BURSA W/O US: CPT | Mod: RT | Performed by: FAMILY MEDICINE

## 2019-06-18 RX ORDER — TRIAMCINOLONE ACETONIDE 40 MG/ML
20 INJECTION, SUSPENSION INTRA-ARTICULAR; INTRAMUSCULAR ONCE
Status: COMPLETED | OUTPATIENT
Start: 2019-06-18 | End: 2019-06-18

## 2019-06-18 RX ADMIN — TRIAMCINOLONE ACETONIDE 20 MG: 40 INJECTION, SUSPENSION INTRA-ARTICULAR; INTRAMUSCULAR at 13:12

## 2019-06-18 ASSESSMENT — MIFFLIN-ST. JEOR: SCORE: 1151.53

## 2019-06-18 NOTE — TELEPHONE ENCOUNTER
Reason for Call:  Other hip pain    Detailed comments: Patient is asking for an x-ray of her right hip due to pain.    Phone Number Patient can be reached at: Home number on file [phone] (home)    Best Time: any    Can we leave a detailed message on this number? YES    Call taken on 6/18/2019 at 9:07 AM by Opal Amezquita

## 2019-06-18 NOTE — TELEPHONE ENCOUNTER
S-(situation): increased pain in right hip.     B-(background): 6/14/19 OV with Iris.     A-(assessment): last night burst out in tears because the pain was so bad. Asking to be seen.     R-(recommendations): Office visit for re-evaluation. Scheduled with Dr. bae today at 11am.

## 2019-06-18 NOTE — PATIENT INSTRUCTIONS
You do have some hip degeneration and bursitis of the right hip.    I did an injection of your greater bursa to see if this can help with the pain.     Please go to physical therapy.    If you are not better I would recommend to see sports medicine and if needed orthopedics.          Thank you for choosing [facility].  You may be receiving an email and/or telephone survey request from [facility] [employer] regarding your visit today.  Please take a few minutes to respond to the survey to let us know how we are doing.      If you have questions or concerns, please contact us via Piki or you can contact your care team at [phone].    Our Clinic hours are:  Monday 6:40 am  to 7:00 pm  Tuesday -Friday 6:40 am to 5:00 pm    The [place] outpatient lab hours are:  Monday - Friday 6:10 am to 4:45 pm  Saturdays 7:00 am to 11:00 am  Appointments are required, call [phone]    If you have clinical questions after hours or would like to schedule an appointment,  call the clinic at [phone].

## 2019-06-18 NOTE — PROGRESS NOTES
"Subjective     Catherine Salgado is a 79 year old female who presents to clinic today for the following health issues:    HPI   Musculoskeletal problem/pain      Duration: two months-pain has changed since seeing Iris. No longer shooting pain down the leg. It is very localized to the hip joint. Has PT appt tomorrow.    Description  Location: right hip    Intensity: currently: 7-8/10 at its worse: 10/10    Accompanying signs and symptoms: it felt hot and swollen last night    History  Previous similar problem: no   Previous evaluation: 6/14/19 was seen by Iris Gonzalez. No x-ray was done    Precipitating or alleviating factors:  Trauma or overuse: no   Aggravating factors include: walking, climbing stairs and outward motion of her leg    Therapies tried and outcome: acetaminophen and muscle relaxant, which helped initially    No gi or gu symptoms.  Standing no pain.  Walking has pain or pressure on right hip has pain          Reviewed and updated as needed this visit by Provider         Review of Systems   ROS COMP: CONSTITUTIONAL:NEGATIVE for fever, chills, change in weight  INTEGUMENTARY/SKIN: right hip felt warm  MUSCULOSKELETAL: right hip pain and back pain   NEURO: NEGATIVE for weakness, dizziness or paresthesias  HEME/ALLERGY/IMMUNE: NEGATIVE for bleeding problems      Objective    /74 (Cuff Size: Adult Regular)   Pulse 59   Temp 97.8  F (36.6  C) (Tympanic)   Ht 1.613 m (5' 3.5\")   Wt 68.9 kg (152 lb)   SpO2 95%   BMI 26.50 kg/m    Body mass index is 26.5 kg/m .  Physical Exam   GENERAL APPEARANCE: alert, no distress and cooperative  MS: left hip full rom, right hip has pain with internal and more external rotation, flexion was fine, has pain over right greater trochanter bursa.  SKIN: no suspicious lesions or rashes  NEURO: Normal strength and tone, mentation intact and speech normal  PSYCH: mentation appears normal and affect normal/bright    I have personally reviewed the xray and my " "interpretation is the following:  Pelvis and right hip has mild DJD of both hips.    Discussed doing injection of her right greater trochanter bursitis, discussed risk and benefits.  Consent explained and signed.  Right greater trochanter identified and it was hurting, cleaned with alcohol and betadine. Injected with 22 g 1.5 in need with 1.5 ml of 1% lidocaine with 0.5 ml of kenalog 40, no complications and it helped her pain.          Assessment & Plan     (M25.551) Hip pain, right  (primary encounter diagnosis)  Comment: do physical therapy  Plan: XR Pelvis and Hip Right 2 Views            (M70.61) Trochanteric bursitis of right hip  Comment: injection done and use ice and taking it easy  Plan: triamcinolone (KENALOG-40) injection 20 mg               Tobacco Cessation:   reports that she has been smoking cigarettes.  She has a 20.00 pack-year smoking history. She has never used smokeless tobacco.  Tobacco Cessation Action Plan: Information offered: Patient not interested at this time      BMI:   Estimated body mass index is 26.5 kg/m  as calculated from the following:    Height as of this encounter: 1.613 m (5' 3.5\").    Weight as of this encounter: 68.9 kg (152 lb).           See Patient Instructions    Return in about 1 month (around 7/16/2019) for If not better.    Trip Swift MD  [facility] [place]      "

## 2019-06-19 ENCOUNTER — HOSPITAL ENCOUNTER (OUTPATIENT)
Dept: PHYSICAL THERAPY | Facility: CLINIC | Age: 79
Setting detail: THERAPIES SERIES
End: 2019-06-19
Attending: NURSE PRACTITIONER
Payer: MEDICARE

## 2019-06-19 DIAGNOSIS — M54.41 ACUTE RIGHT-SIDED LOW BACK PAIN WITH RIGHT-SIDED SCIATICA: ICD-10-CM

## 2019-06-19 PROCEDURE — 97161 PT EVAL LOW COMPLEX 20 MIN: CPT | Mod: GP | Performed by: PHYSICAL THERAPIST

## 2019-06-19 PROCEDURE — 97110 THERAPEUTIC EXERCISES: CPT | Mod: GP | Performed by: PHYSICAL THERAPIST

## 2019-06-20 NOTE — PROGRESS NOTES
06/19/19 1300   General Information   Type of Visit Initial OP Ortho PT Evaluation   Start of Care Date 06/19/19   Referring Physician KARO Gonzalez   Patient/Family Goals Statement less pain with walking, gardening   Orders Evaluate and Treat   Date of Order 06/14/19   Certification Required? No   Medical Diagnosis Acute right-sided low back pain with right-sided sciatica   Surgical/Medical history reviewed Yes   Precautions/Limitations no known precautions/limitations   Body Part(s)   Body Part(s) Lumbar Spine/SI;Hip   Presentation and Etiology   Pertinent history of current problem (include personal factors and/or comorbidities that impact the POC) Pt notes that starting a month ago began to have lateral hip pain, has had some radicular symptoms down lateral leg but those seem to have gotten better. Notes that did see a Dr and get an injection for trochanteric bursitis yesterday.    Impairments A. Pain;B. Decreased WB tolerance;F. Decreased strength and endurance;J. Burning   Functional Limitations perform activities of daily living;perform desired leisure / sports activities   Symptom Location Right lateral hip   How/Where did it occur From insidious onset   Onset date of current episode/exacerbation 05/19/19   Chronicity New   Pain rating (0-10 point scale) Best (/10);Worst (/10)   Best (/10) 0   Worst (/10) 10   Pain quality A. Sharp;C. Aching;D. Burning;E. Shooting   Frequency of pain/symptoms A. Constant   Pain/symptoms exacerbated by B. Walking   Pain/symptoms eased by D. Nothing   Progression of symptoms since onset: Unchanged   Current / Previous Interventions   Diagnostic Tests: X-ray   X-ray Results Results   X-ray results degenerative changes lumbar spine, hip unremarkable. see chart for details   Prior Level of Function   Prior Level of Function-Mobility independant   Current Level of Function   Patient role/employment history F. Retired   Fall Risk Screen   Have you fallen 2 or more times in the  "past year? No   Have you fallen and had an injury in the past year? No   Is patient a fall risk? No   Abuse Screen (yes response referral indicated)   Feels Unsafe at Home or Work/School no   Feels Threatened by Someone no   Does Anyone Try to Keep You From Having Contact with Others or Doing Things Outside Your Home? no   Physical Signs of Abuse Present no   Hip Objective Findings   Side (if bilateral, select both right and left) Right   Lumbar Spine/SI Objective Findings   Gait/Locomotion antalgic gait   Balance/Proprioception (Single Leg Stance) unable on R due to pain, fear of giving out   Flexion ROM hands 6\" from floor, painfree   Right Side Bending ROM sidebending B fingertips 4\" down lateral thigh , painfre   Hip Screen R hip ROM painful IR and ER, negative scour   Hip Flexion (L2) Strength 4+/5   Hip Abduction Strength 4-/5, pain    Knee Flexion Strength 5/5   Knee Extension (L3) Strength 5/5   Ankle Dorsiflexion (L4) Strength able heel walk   Ankle Plantar Flexion (S1) Strength able toe walk   Lumbar/Hip/Knee/Foot Strength Comments hip IR 3/5, pain, ER 4/5   Piriformis Flexibility tightness at midline B   SLR negative   Slump Test positive right    Sensation Testing reports intact   Palpation ttp greater trochanter significantly   Planned Therapy Interventions   Planned Therapy Interventions balance training;gait training;joint mobilization;manual therapy;neuromuscular re-education;ROM;stretching;strengthening   Clinical Impression   Criteria for Skilled Therapeutic Interventions Met yes, treatment indicated   PT Diagnosis R hip pain secondary to lumbar radiculopathy vs trochanteric bursitis   Influenced by the following impairments pain, decreased ROM, decreased strength   Functional limitations due to impairments decreased particiaption walking, standing, stairs, gardening, household tasks   Clinical Presentation Evolving/Changing   Clinical Presentation Rationale acute condition, pt recieved injection " prior to session, few comorbidities, active patient   Clinical Decision Making (Complexity) Low complexity   Therapy Frequency 1 time/week   Predicted Duration of Therapy Intervention (days/wks) 8 weeks   Risk & Benefits of therapy have been explained Yes   Patient, Family & other staff in agreement with plan of care Yes   Education Assessment   Preferred Learning Style Pictures/video;Demonstration   Barriers to Learning No barriers   ORTHO GOALS   PT Ortho Eval Goals 1;2;3;4   Ortho Goal 1   Goal Identifier 1   Goal Description Patient will be able to demonstrate floor transfer to allow for gardening   Target Date 08/15/19   Ortho Goal 2   Goal Identifier 2   Goal Description Patient will be able to walk 10 minutes with < 2/10 pain and demosntrate noramlized gait mechanics   Target Date 08/15/19   Ortho Goal 3   Goal Identifier 3   Goal Description Patient will demonstrate >/= 4+/5 hip abductor strength    Target Date 08/15/19   Ortho Goal 4   Goal Identifier 4   Goal Description Patient will be independant with HEP for long term self managmeent   Target Date 08/15/19   Total Evaluation Time   PT Eval, Low Complexity Minutes ([id number]) 25

## 2019-06-26 ENCOUNTER — HOSPITAL ENCOUNTER (OUTPATIENT)
Dept: PHYSICAL THERAPY | Facility: CLINIC | Age: 79
Setting detail: THERAPIES SERIES
End: 2019-06-26
Attending: NURSE PRACTITIONER
Payer: MEDICARE

## 2019-06-26 PROCEDURE — 97110 THERAPEUTIC EXERCISES: CPT | Mod: GP | Performed by: PHYSICAL THERAPIST

## 2019-07-03 ENCOUNTER — HOSPITAL ENCOUNTER (OUTPATIENT)
Dept: PHYSICAL THERAPY | Facility: CLINIC | Age: 79
Setting detail: THERAPIES SERIES
End: 2019-07-03
Attending: NURSE PRACTITIONER
Payer: MEDICARE

## 2019-07-03 PROCEDURE — 97110 THERAPEUTIC EXERCISES: CPT | Mod: GP | Performed by: PHYSICAL THERAPIST

## 2019-07-03 PROCEDURE — 97140 MANUAL THERAPY 1/> REGIONS: CPT | Mod: GP | Performed by: PHYSICAL THERAPIST

## 2019-07-10 ENCOUNTER — HOSPITAL ENCOUNTER (OUTPATIENT)
Dept: PHYSICAL THERAPY | Facility: CLINIC | Age: 79
Setting detail: THERAPIES SERIES
End: 2019-07-10
Attending: NURSE PRACTITIONER
Payer: MEDICARE

## 2019-07-10 PROCEDURE — 97110 THERAPEUTIC EXERCISES: CPT | Mod: GP | Performed by: PHYSICAL THERAPIST

## 2019-07-22 NOTE — ADDENDUM NOTE
Encounter addended by: Natasha Godoy, PT on: 7/22/2019 11:29 AM   Actions taken: Sign clinical note, Flowsheet accepted, Episode resolved

## 2019-07-22 NOTE — PROGRESS NOTES
Outpatient Physical Therapy Discharge Note     Patient: Catherine Salgado  : 1940    Beginning/End Dates of Reporting Period:  19 to 2019    Referring Provider: Iris Gonzalez Diagnosis: R hip pain secondary to lumbar radiculopathy vs trochanteric bursitis     Client Self Report: Pt notes that low back is much better then last week. Lateral hip pain on and off dependant on the weather. Yesterday was in car for ~ 30 minutes and started to have left sided low back pain. Resolved once out of the car. Notes that able to stand and balance better too. Not taking any pain medications this week.     Objective Measurements:  Objective Measure: Hip abd strength  Details:   Objective Measure: lumbar ROM  Details: flexion hands to floor, extension limited 50% ( painfree) sidebending B equivalent, no increased symptoms  Objective Measure: Toma  Details: low     Outcome Measures (most recent score):  Toma STarT Sub-Score (Q5-9): 0  Toma STarT Total Score (all 9): 0       Goals:  Goal Identifier 1   Goal Description Patient will be able to demonstrate floor transfer to allow for gardening   Target Date 08/15/19   Date Met  07/10/19   Progress:     Goal Identifier 2   Goal Description Patient will be able to walk 10 minutes with < 2/10 pain and demosntrate noramlized gait mechanics   Target Date 08/15/19   Date Met  07/10/19   Progress:     Goal Identifier 3   Goal Description Patient will demonstrate >/= 4+/5 hip abductor strength    Target Date 08/15/19   Date Met  (4/5)   Progress:     Goal Identifier 4   Goal Description Patient will be independant with HEP for long term self managmeent   Target Date 08/15/19   Date Met  07/10/19   Progress:       Progress Toward Goals:   Progress this reporting period: Pt demonstrating good improvements in strength and ROM, meeting 3/4 goals in physical therapy, with good progress toward final goal. Pt independent with HEP, and anticipate will benefit from  continued performance for home management.      Plan:  Discharge from therapy.    Discharge:    Reason for Discharge: Patient has met 3/4 goals.  Patient chooses to discontinue therapy.    Equipment Issued: na    Discharge Plan: Patient to continue home program.

## 2019-08-30 NOTE — PATIENT INSTRUCTIONS
Thank you for choosing [facility].  You may be receiving a survey in the mail from [facility] regarding your visit today.  Please take a few minutes to complete and return the survey to let us know how we are doing.      Our Clinic hours are:  Mondays    7:20 am - 7 pm  Tues -  Fri  7:20 am - 5 pm    Clinic Phone: [phone]    The clinic lab opens at 7:30 am Mon - Fri and appointments are required.    [facility]. [phone]  Monday-Thursday 8 am - 7pm  Tues/Wed/Fri 8 am - 5:30 pm          Left:/shoulder, ribs

## 2020-01-06 ENCOUNTER — OFFICE VISIT (OUTPATIENT)
Dept: ENDOCRINOLOGY | Facility: CLINIC | Age: 80
End: 2020-01-06
Payer: MEDICARE

## 2020-01-06 VITALS
WEIGHT: 155.8 LBS | TEMPERATURE: 97.6 F | HEART RATE: 74 BPM | BODY MASS INDEX: 26.6 KG/M2 | SYSTOLIC BLOOD PRESSURE: 125 MMHG | RESPIRATION RATE: 16 BRPM | HEIGHT: 64 IN | DIASTOLIC BLOOD PRESSURE: 83 MMHG

## 2020-01-06 DIAGNOSIS — E03.9 HYPOTHYROIDISM, UNSPECIFIED TYPE: Primary | ICD-10-CM

## 2020-01-06 LAB
T3FREE SERPL-MCNC: 2.3 PG/ML (ref 2.3–4.2)
T4 FREE SERPL-MCNC: 0.93 NG/DL (ref 0.76–1.46)
TSH SERPL DL<=0.005 MIU/L-ACNC: 9.32 MU/L (ref 0.4–4)

## 2020-01-06 PROCEDURE — 84481 FREE ASSAY (FT-3): CPT | Performed by: INTERNAL MEDICINE

## 2020-01-06 PROCEDURE — 84439 ASSAY OF FREE THYROXINE: CPT | Performed by: INTERNAL MEDICINE

## 2020-01-06 PROCEDURE — 99204 OFFICE O/P NEW MOD 45 MIN: CPT | Performed by: INTERNAL MEDICINE

## 2020-01-06 PROCEDURE — 84443 ASSAY THYROID STIM HORMONE: CPT | Performed by: INTERNAL MEDICINE

## 2020-01-06 PROCEDURE — 36415 COLL VENOUS BLD VENIPUNCTURE: CPT | Performed by: INTERNAL MEDICINE

## 2020-01-06 ASSESSMENT — MIFFLIN-ST. JEOR: SCORE: 1168.76

## 2020-01-06 NOTE — PROGRESS NOTES
"CC: Hypothyroidism.     HPI: Patient presents for evaluation of hypothyroidism.   Initial diagnosis was ~5 years ago.     Initially used [place] and then after 18 months told she was \"cured\" and stopped it.   Then a few years later asked to be re-tested and found to be hypothyroid.   Started on levothyroxine. Taking 50 mcg daily.   Wakes at 0400 to take it. Takes without any other pills.   Eats breakfast at 1000.     Feels the medication is not working.   C/O low energy.   Trouble falling asleep. Mind racing.   She has a history of poor sleep. Only 5-6 hours a night.   Tosses and turns.   Snores occasionally.   No prior sleep study.     She gets rare hot flashes. Was prescribed estradiol but did not take as she smokes.     No black or bloody stools.  She does take iron but was never told she had ASHLEY.     No palpitations or SOB.     She currently does not have a primary care MD.     ROS: 10 point ROS neg other than the symptoms noted above in the HPI.    PMH:   Patient Active Problem List   Diagnosis     Hypertension goal BP (blood pressure) < 140/90     Macular degeneration (senile) of retina     Hyperlipidemia     Phlebitis and thrombophlebitis of other deep vessels of lower extremities     Bunion     Generalized osteoarthrosis, unspecified site     HYPERLIPIDEMIA LDL GOAL <130     Advanced directives, counseling/discussion     Hypothyroidism     Cerebral infarction (H)     Health Care Home     Meds:  Current Outpatient Medications   Medication     Ascorbic Acid (VITAMIN C PO)     aspirin 81 MG tablet     Cholecalciferol (VITAMIN D-3) 5000 UNIT TABS     Coenzyme Q10 (COQ-10) 100 MG CAPS     Ferrous Fumarate (IRON) 18 MG TBCR     Garlic 500 MG TABS     levothyroxine (SYNTHROID/LEVOTHROID) 50 MCG tablet     Lutein 20 MG CAPS     Misc Natural Products (TART CHERRY ADVANCED PO)     Probiotic Product (PROBIOTIC PO)     Selenium 200 MCG CAPS     vitamin B complex with vitamin C (VITAMIN  B COMPLEX) TABS tablet     " "EPINEPHrine (ADRENACLICK) 0.3 MG/0.3ML injection 2-pack     estradiol (ESTRACE) 0.5 MG tablet     tiZANidine (ZANAFLEX) 2 MG tablet     No current facility-administered medications for this visit.      FHX:   Grandmother had a goiter.     SHX:  Smokes 1/2 PPD.   Retired LPN.     Exam:   Vital signs:  Temp: 97.6  F (36.4  C) Temp src: Oral BP: 125/83 Pulse: 74   Resp: 16       Height: 161.3 cm (5' 3.5\") Weight: 70.7 kg (155 lb 12.8 oz)  Estimated body mass index is 27.17 kg/m  as calculated from the following:    Height as of this encounter: 1.613 m (5' 3.5\").    Weight as of this encounter: 70.7 kg (155 lb 12.8 oz).  Gen: In NAD.   HEENT: no proptosis or lid lag, EOMI, no palpable thyroid tissue. Mobile ~1 cm LN in right anterior chain.   Card: S1 S2 RRR no m/r/g. no LE edema.   Pulm: CTA b/l.   GI: NT ND +BS.   MSK: no gross deformities.   Derm: no rashes or lesions.   Neuro: no tremor, +2 DTR's.     A/P:   Hypothyroidism - Extensive discussion of thyroid hormone and normal physiology. Included was discussion of thyroid in relation to weight and energy. Last TSH from 3/2019 was normal. She has poor sleep patterns and I explained the need to address these. She has convinced herself her problems are due to her thyroid.   -Labs today.   -If normal, I will refer you to Sleep medicine.       Juan Guzman MD on 1/6/2020 at 12:29 PM      "

## 2020-01-06 NOTE — LETTER
"    1/6/2020         RE: Catherine Salgado  [address]  [place] [address]        Dear Colleague,    Thank you for referring your patient, Catherine Salgado, to the [facility]. Please see a copy of my visit note below.    CC: Hypothyroidism.     HPI: Patient presents for evaluation of hypothyroidism.   Initial diagnosis was ~5 years ago.     Initially used [place] and then after 18 months told she was \"cured\" and stopped it.   Then a few years later asked to be re-tested and found to be hypothyroid.   Started on levothyroxine. Taking 50 mcg daily.   Wakes at 0400 to take it. Takes without any other pills.   Eats breakfast at 1000.     Feels the medication is not working.   C/O low energy.   Trouble falling asleep. Mind racing.   She has a history of poor sleep. Only 5-6 hours a night.   Tosses and turns.   Snores occasionally.   No prior sleep study.     She gets rare hot flashes. Was prescribed estradiol but did not take as she smokes.     No black or bloody stools.  She does take iron but was never told she had ASHLEY.     No palpitations or SOB.     She currently does not have a primary care MD.     ROS: 10 point ROS neg other than the symptoms noted above in the HPI.    PMH:   Patient Active Problem List   Diagnosis     Hypertension goal BP (blood pressure) < 140/90     Macular degeneration (senile) of retina     Hyperlipidemia     Phlebitis and thrombophlebitis of other deep vessels of lower extremities     Bunion     Generalized osteoarthrosis, unspecified site     HYPERLIPIDEMIA LDL GOAL <130     Advanced directives, counseling/discussion     Hypothyroidism     Cerebral infarction (H)     Health Care Home     Meds:  Current Outpatient Medications   Medication     Ascorbic Acid (VITAMIN C PO)     aspirin 81 MG tablet     Cholecalciferol (VITAMIN D-3) 5000 UNIT TABS     Coenzyme Q10 (COQ-10) 100 MG CAPS     Ferrous Fumarate (IRON) 18 MG TBCR     Garlic 500 MG TABS     levothyroxine (SYNTHROID/LEVOTHROID) " "50 MCG tablet     Lutein 20 MG CAPS     Misc Natural Products (TART CHERRY ADVANCED PO)     Probiotic Product (PROBIOTIC PO)     Selenium 200 MCG CAPS     vitamin B complex with vitamin C (VITAMIN  B COMPLEX) TABS tablet     EPINEPHrine (ADRENACLICK) 0.3 MG/0.3ML injection 2-pack     estradiol (ESTRACE) 0.5 MG tablet     tiZANidine (ZANAFLEX) 2 MG tablet     No current facility-administered medications for this visit.      FHX:   Grandmother had a goiter.     SHX:  Smokes 1/2 PPD.   Retired LPN.     Exam:   Vital signs:  Temp: 97.6  F (36.4  C) Temp src: Oral BP: 125/83 Pulse: 74   Resp: 16       Height: 161.3 cm (5' 3.5\") Weight: 70.7 kg (155 lb 12.8 oz)  Estimated body mass index is 27.17 kg/m  as calculated from the following:    Height as of this encounter: 1.613 m (5' 3.5\").    Weight as of this encounter: 70.7 kg (155 lb 12.8 oz).  Gen: In NAD.   HEENT: no proptosis or lid lag, EOMI, no palpable thyroid tissue. Mobile ~1 cm LN in right anterior chain.   Card: S1 S2 RRR no m/r/g. no LE edema.   Pulm: CTA b/l.   GI: NT ND +BS.   MSK: no gross deformities.   Derm: no rashes or lesions.   Neuro: no tremor, +2 DTR's.     A/P:   Hypothyroidism - Extensive discussion of thyroid hormone and normal physiology. Included was discussion of thyroid in relation to weight and energy. Last TSH from 3/2019 was normal. She has poor sleep patterns and I explained the need to address these. She has convinced herself her problems are due to her thyroid.   -Labs today.   -If normal, I will refer you to Sleep medicine.       Juan Guzman MD on 1/6/2020 at 12:29 PM        Again, thank you for allowing me to participate in the care of your patient.        Sincerely,        Juan Guzman MD    "

## 2020-01-06 NOTE — NURSING NOTE
"Chief Complaint   Patient presents with     Consult     hypothyroidism - states \" she was told she was cured\" - she says she has no eyelashes, can't sleep at night, and has very dry skin        Initial /83 (BP Location: Left arm, Patient Position: Chair, Cuff Size: Adult Regular)   Pulse 74   Temp 97.6  F (36.4  C) (Oral)   Resp 16   Ht 1.613 m (5' 3.5\")   Wt 70.7 kg (155 lb 12.8 oz)   BMI 27.17 kg/m   Estimated body mass index is 27.17 kg/m  as calculated from the following:    Height as of this encounter: 1.613 m (5' 3.5\").    Weight as of this encounter: 70.7 kg (155 lb 12.8 oz).  Medications and allergies reviewed.    Abraham SMITH CMA ([facility])    "

## 2020-01-07 DIAGNOSIS — E03.9 HYPOTHYROIDISM, UNSPECIFIED TYPE: Primary | ICD-10-CM

## 2020-01-07 RX ORDER — LEVOTHYROXINE SODIUM 75 UG/1
75 TABLET ORAL DAILY
Qty: 30 TABLET | Refills: 11 | Status: SHIPPED | OUTPATIENT
Start: 2020-01-07 | End: 2021-01-14

## 2020-03-09 ENCOUNTER — OFFICE VISIT (OUTPATIENT)
Dept: ENDOCRINOLOGY | Facility: CLINIC | Age: 80
End: 2020-03-09
Payer: MEDICARE

## 2020-03-09 VITALS
OXYGEN SATURATION: 97 % | DIASTOLIC BLOOD PRESSURE: 80 MMHG | BODY MASS INDEX: 26.63 KG/M2 | SYSTOLIC BLOOD PRESSURE: 139 MMHG | WEIGHT: 156 LBS | HEIGHT: 64 IN | HEART RATE: 86 BPM | RESPIRATION RATE: 16 BRPM | TEMPERATURE: 97.6 F

## 2020-03-09 DIAGNOSIS — R49.0 HOARSENESS: Primary | ICD-10-CM

## 2020-03-09 DIAGNOSIS — R49.0 HOARSENESS: ICD-10-CM

## 2020-03-09 DIAGNOSIS — E03.9 HYPOTHYROIDISM, UNSPECIFIED TYPE: Primary | ICD-10-CM

## 2020-03-09 LAB
T3FREE SERPL-MCNC: 2.4 PG/ML (ref 2.3–4.2)
T4 FREE SERPL-MCNC: 1.19 NG/DL (ref 0.76–1.46)
TSH SERPL DL<=0.005 MIU/L-ACNC: 3.25 MU/L (ref 0.4–4)

## 2020-03-09 PROCEDURE — 84481 FREE ASSAY (FT-3): CPT | Performed by: INTERNAL MEDICINE

## 2020-03-09 PROCEDURE — 36415 COLL VENOUS BLD VENIPUNCTURE: CPT | Performed by: INTERNAL MEDICINE

## 2020-03-09 PROCEDURE — 84439 ASSAY OF FREE THYROXINE: CPT | Performed by: INTERNAL MEDICINE

## 2020-03-09 PROCEDURE — 99214 OFFICE O/P EST MOD 30 MIN: CPT | Performed by: INTERNAL MEDICINE

## 2020-03-09 PROCEDURE — 84443 ASSAY THYROID STIM HORMONE: CPT | Performed by: INTERNAL MEDICINE

## 2020-03-09 ASSESSMENT — MIFFLIN-ST. JEOR: SCORE: 1164.67

## 2020-03-09 NOTE — LETTER
"    3/9/2020         RE: Catherine Salgado  [address]  [place] [address]        Dear Colleague,    Thank you for referring your patient, Catherine Salgado, to the [facility]. Please see a copy of my visit note below.    S: Patient presents for evaluation of hypothyroidism.   Initial diagnosis was ~5 years ago.     Initially used [place] and then after 18 months told she was \"cured\" and stopped it.   Then a few years later asked to be re-tested and found to be hypothyroid.   Started on levothyroxine. Taking 50 mcg daily.   Wakes at 0400 to take it. Takes without any other pills.   Eats breakfast at 1000.     Feels the medication is not working.   C/O low energy.   Trouble falling asleep. Mind racing.   She has a history of poor sleep. Only 5-6 hours a night.   Tosses and turns.   Snores occasionally.   No prior sleep study.     She gets rare hot flashes. Was prescribed estradiol but did not take as she smokes.     No black or bloody stools.  She does take iron but was never told she had ASHLEY.     No palpitations or SOB.     She currently does not have a primary care MD.     Returns in 3/2020, c/o no change in hoarseness.   No recent illness. No GERD. She does have seasonal allergies.   She has to clear her throat a lot.   Smokes 1/2 PPD.     She has begun have issues with constipation. Resolves with ingestion of prunes.   No diarrhea or recent illness.     Trouble staying awake if she does not remain active.   Her sleep has not changed since last visit. She does note that she tends to sleep better if she exercises that day.     ROS: 10 point ROS neg other than the symptoms noted above in the HPI.    Exam:   Vital signs:  Temp: 97.6  F (36.4  C)   BP: 139/80 Pulse: 86   Resp: 16 SpO2: 97 %     Height: 161.3 cm (5' 3.5\") Weight: 70.8 kg (156 lb)  Estimated body mass index is 27.2 kg/m  as calculated from the following:    Height as of this encounter: 1.613 m (5' 3.5\").    Weight as of this encounter: 70.8 kg " (156 lb).  Gen: In NAD.   HEENT: no proptosis or lid lag, EOMI, no palpable thyroid tissue.  Ear canals occluded by wax. Dry nasal mucosa, faint purple hue. OP clear.   Card: S1 S2 RRR no m/r/g. no LE edema.   Pulm: CTA b/l.   GI: NT ND +BS.   MSK: no gross deformities.   Derm: no rashes or lesions.   Neuro: no tremor, +2 DTR's.     A/P:   Hypothyroidism - Extensive discussion of thyroid hormone and normal physiology. Included was discussion of thyroid in relation to weight and energy. Last TSH from 3/2019 was normal. She has poor sleep patterns and I explained the need to address these. She has convinced herself that all her problems are due to her thyroid.   Her TSH was elevated in 1/2020 with normal T4 and T3. Levothyroxine was increased from 50 to 75 mcg daily.   In 3/2020, symptoms largely unchanged and she is worried her hypothyroidism is causing hoarseness. She does have some evidence of mild congestion on exam. She is remodeling her home and might be having irritation from particulate matter (dust).   -Labs today. If normal, I will send you to ENT for the hoarseness.     I spent 25 minutes with the patient. Greater than 50% of the time spent in . Risks/benefits/alternatives reviewed.     Juan Guzman MD on 3/9/2020 at 10:58 AM        Again, thank you for allowing me to participate in the care of your patient.        Sincerely,        Juan Guzman MD

## 2020-03-09 NOTE — NURSING NOTE
"Chief Complaint   Patient presents with     RECHECK     thyroid       Initial /80 (BP Location: Right arm, Patient Position: Sitting, Cuff Size: Adult Large)   Pulse 86   Temp 97.6  F (36.4  C)   Resp 16   Ht 1.613 m (5' 3.5\")   Wt 70.8 kg (156 lb)   SpO2 97%   BMI 27.20 kg/m   Estimated body mass index is 27.2 kg/m  as calculated from the following:    Height as of this encounter: 1.613 m (5' 3.5\").    Weight as of this encounter: 70.8 kg (156 lb).  BP completed using cuff size: large  Medications and allergies reviewed.      Ioana COLEMAN MA    "

## 2020-03-09 NOTE — PROGRESS NOTES
"S: Patient presents for evaluation of hypothyroidism.   Initial diagnosis was ~5 years ago.     Initially used [place] and then after 18 months told she was \"cured\" and stopped it.   Then a few years later asked to be re-tested and found to be hypothyroid.   Started on levothyroxine. Taking 50 mcg daily.   Wakes at 0400 to take it. Takes without any other pills.   Eats breakfast at 1000.     Feels the medication is not working.   C/O low energy.   Trouble falling asleep. Mind racing.   She has a history of poor sleep. Only 5-6 hours a night.   Tosses and turns.   Snores occasionally.   No prior sleep study.     She gets rare hot flashes. Was prescribed estradiol but did not take as she smokes.     No black or bloody stools.  She does take iron but was never told she had ASHLEY.     No palpitations or SOB.     She currently does not have a primary care MD.     Returns in 3/2020, c/o no change in hoarseness.   No recent illness. No GERD. She does have seasonal allergies.   She has to clear her throat a lot.   Smokes 1/2 PPD.     She has begun have issues with constipation. Resolves with ingestion of prunes.   No diarrhea or recent illness.     Trouble staying awake if she does not remain active.   Her sleep has not changed since last visit. She does note that she tends to sleep better if she exercises that day.     ROS: 10 point ROS neg other than the symptoms noted above in the HPI.    Exam:   Vital signs:  Temp: 97.6  F (36.4  C)   BP: 139/80 Pulse: 86   Resp: 16 SpO2: 97 %     Height: 161.3 cm (5' 3.5\") Weight: 70.8 kg (156 lb)  Estimated body mass index is 27.2 kg/m  as calculated from the following:    Height as of this encounter: 1.613 m (5' 3.5\").    Weight as of this encounter: 70.8 kg (156 lb).  Gen: In NAD.   HEENT: no proptosis or lid lag, EOMI, no palpable thyroid tissue.  Ear canals occluded by wax. Dry nasal mucosa, faint purple hue. OP clear.   Card: S1 S2 RRR no m/r/g. no LE edema.   Pulm: CTA b/l.   GI: " NT ND +BS.   MSK: no gross deformities.   Derm: no rashes or lesions.   Neuro: no tremor, +2 DTR's.     A/P:   Hypothyroidism - Extensive discussion of thyroid hormone and normal physiology. Included was discussion of thyroid in relation to weight and energy. Last TSH from 3/2019 was normal. She has poor sleep patterns and I explained the need to address these. She has convinced herself that all her problems are due to her thyroid.   Her TSH was elevated in 1/2020 with normal T4 and T3. Levothyroxine was increased from 50 to 75 mcg daily.   In 3/2020, symptoms largely unchanged and she is worried her hypothyroidism is causing hoarseness. She does have some evidence of mild congestion on exam. She is remodeling her home and might be having irritation from particulate matter (dust).   -Labs today. If normal, I will send you to ENT for the hoarseness.     I spent 25 minutes with the patient. Greater than 50% of the time spent in . Risks/benefits/alternatives reviewed.     Juan Guzman MD on 3/9/2020 at 10:58 AM

## 2020-03-10 ENCOUNTER — TELEPHONE (OUTPATIENT)
Dept: ENDOCRINOLOGY | Facility: CLINIC | Age: 80
End: 2020-03-10

## 2020-03-10 NOTE — RESULT ENCOUNTER NOTE
Called patient and left VM, requested call back to the care team. Clinic number provided.     Ioana COLEMAN MA

## 2020-03-10 NOTE — RESULT ENCOUNTER NOTE
Missael Mcdonnell from [address] [place] [address] #[phone] / [facility]#[phone] Re: Refill Enalapril HCZ 10-25.  Thank you Patient called back and her results were given to her by XAVIER Ramirez MA

## 2020-03-10 NOTE — TELEPHONE ENCOUNTER
Returned call to pt and gave her lab follow directions. No change to medications thyroid labs normal and ENT referral was placed.    Fernanda Baron RN Specialty Triage 3/10/2020 3:18 PM

## 2020-03-10 NOTE — TELEPHONE ENCOUNTER
Reason for Call:  Other returning call    Detailed comments: Patient is returning call.    Phone Number Patient can be reached at: Home number on file [phone] (home)    Best Time: any    Can we leave a detailed message on this number? YES    Call taken on 3/10/2020 at 1:58 PM by Juli Cardenas

## 2021-01-12 DIAGNOSIS — E03.9 HYPOTHYROIDISM, UNSPECIFIED TYPE: ICD-10-CM

## 2021-01-12 RX ORDER — LEVOTHYROXINE SODIUM 50 UG/1
50 TABLET ORAL DAILY
Status: CANCELLED | OUTPATIENT
Start: 2021-01-12

## 2021-01-12 NOTE — TELEPHONE ENCOUNTER
"Routing to Endo.  Requested Prescriptions   Pending Prescriptions Disp Refills     levothyroxine (SYNTHROID/LEVOTHROID) 50 MCG tablet       Sig: Take 1 tablet (50 mcg) by mouth daily       Thyroid Protocol Failed - 1/12/2021  9:02 AM        Failed - Recent (12 mo) or future (30 days) visit within the authorizing provider's specialty     Patient has had an office visit with the authorizing provider or a provider within the authorizing providers department within the previous 12 mos or has a future within next 30 days. See \"Patient Info\" tab in inbasket, or \"Choose Columns\" in Meds & Orders section of the refill encounter.              Passed - Patient is 12 years or older        Passed - Medication is active on med list        Passed - Normal TSH on file in past 12 months     Recent Labs   Lab Test 03/09/20  1103   TSH 3.25              Passed - No active pregnancy on record     If patient is pregnant or has had a positive pregnancy test, please check TSH.          Passed - No positive pregnancy test in past 12 months     If patient is pregnant or has had a positive pregnancy test, please check TSH.           Rosangela GRIGGS RN, BSN      "

## 2021-01-14 DIAGNOSIS — E03.9 HYPOTHYROIDISM, UNSPECIFIED TYPE: ICD-10-CM

## 2021-01-14 RX ORDER — LEVOTHYROXINE SODIUM 75 UG/1
75 TABLET ORAL DAILY
Qty: 30 TABLET | Refills: 11 | Status: SHIPPED | OUTPATIENT
Start: 2021-01-14 | End: 2022-01-18

## 2021-01-14 NOTE — TELEPHONE ENCOUNTER
Pt has been taking 75 mcg of Synthroid. 50 mcg dose denied refill.   Susanna PALENCIA RN BSN PHN  Specialty Clinics

## 2021-05-12 ENCOUNTER — TELEPHONE (OUTPATIENT)
Dept: FAMILY MEDICINE | Facility: CLINIC | Age: 81
End: 2021-05-12

## 2021-05-12 DIAGNOSIS — T63.441A BEE STING REACTION, ACCIDENTAL OR UNINTENTIONAL, INITIAL ENCOUNTER: ICD-10-CM

## 2021-05-13 RX ORDER — EPINEPHRINE 0.3 MG/.3ML
INJECTION INTRAMUSCULAR
Refills: 0 | OUTPATIENT
Start: 2021-05-13

## 2021-05-13 NOTE — TELEPHONE ENCOUNTER
"Agreed - looks like almost 2 years since being seen in FM, >1 yr since Endo appt. Refill denied as \"needs appointment\". Team - not sure if you call pts after this so will route.  Best,  Irlanda Luciano MD  "

## 2021-05-13 NOTE — TELEPHONE ENCOUNTER
Routing refill request to provider for review/approval because:  Patient needs to be seen because it has been more than 1 year since last office visit.    Rosangela GRIGGS RN, BSN

## 2021-05-20 ENCOUNTER — OFFICE VISIT (OUTPATIENT)
Dept: FAMILY MEDICINE | Facility: CLINIC | Age: 81
End: 2021-05-20
Payer: MEDICARE

## 2021-05-20 VITALS
WEIGHT: 150 LBS | SYSTOLIC BLOOD PRESSURE: 150 MMHG | OXYGEN SATURATION: 96 % | DIASTOLIC BLOOD PRESSURE: 80 MMHG | RESPIRATION RATE: 16 BRPM | BODY MASS INDEX: 26.58 KG/M2 | HEIGHT: 63 IN | TEMPERATURE: 97.1 F | HEART RATE: 58 BPM

## 2021-05-20 DIAGNOSIS — Z00.00 ENCOUNTER FOR MEDICARE ANNUAL WELLNESS EXAM: Primary | ICD-10-CM

## 2021-05-20 DIAGNOSIS — Z72.0 TOBACCO ABUSE: ICD-10-CM

## 2021-05-20 DIAGNOSIS — E03.9 HYPOTHYROIDISM, UNSPECIFIED TYPE: ICD-10-CM

## 2021-05-20 DIAGNOSIS — E78.5 HYPERLIPIDEMIA LDL GOAL <130: ICD-10-CM

## 2021-05-20 DIAGNOSIS — I10 HYPERTENSION GOAL BP (BLOOD PRESSURE) < 140/90: ICD-10-CM

## 2021-05-20 DIAGNOSIS — T63.441A BEE STING REACTION, ACCIDENTAL OR UNINTENTIONAL, INITIAL ENCOUNTER: ICD-10-CM

## 2021-05-20 PROCEDURE — 99213 OFFICE O/P EST LOW 20 MIN: CPT | Mod: 25 | Performed by: FAMILY MEDICINE

## 2021-05-20 PROCEDURE — G0439 PPPS, SUBSEQ VISIT: HCPCS | Performed by: FAMILY MEDICINE

## 2021-05-20 RX ORDER — EPINEPHRINE 0.3 MG/.3ML
0.3 INJECTION SUBCUTANEOUS PRN
Qty: 0.6 ML | Refills: 0 | Status: SHIPPED | OUTPATIENT
Start: 2021-05-20 | End: 2021-05-20

## 2021-05-20 RX ORDER — EPINEPHRINE 0.3 MG/.3ML
0.3 INJECTION SUBCUTANEOUS PRN
Qty: 0.6 ML | Refills: 0 | Status: SHIPPED | OUTPATIENT
Start: 2021-05-20 | End: 2022-07-19

## 2021-05-20 ASSESSMENT — MIFFLIN-ST. JEOR: SCORE: 1128.49

## 2021-05-20 ASSESSMENT — PAIN SCALES - GENERAL: PAINLEVEL: MILD PAIN (2)

## 2021-05-20 NOTE — PATIENT INSTRUCTIONS
Think about Tetanus vaccine, shingles ( Zoster), and Pneumonia vaccine.     Check vitamin levels and also thyroid next week.         Patient Education   Personalized Prevention Plan  You are due for the preventive services outlined below.  Your care team is available to assist you in scheduling these services.  If you have already completed any of these items, please share that information with your care team to update in your medical record.  Health Maintenance Due   Topic Date Due     ANNUAL REVIEW OF HM ORDERS  Never done     Pneumococcal Vaccine (1 of 2 - PPSV23) Never done     Hepatitis C Screening  Never done     Zoster (Shingles) Vaccine (1 of 2) Never done     Diptheria Tetanus Pertussis (DTAP/TDAP/TD) Vaccine (2 - Td) 12/22/2002     Annual Wellness Visit  04/14/2017     FALL RISK ASSESSMENT  02/06/2018     Discuss Advance Care Planning  06/26/2018     Cholesterol Lab  04/14/2021

## 2021-05-20 NOTE — PROGRESS NOTES
"SUBJECTIVE:   Catherine Salgado is a 81 year old female who presents for Preventive Visit.      Patient has been advised of split billing requirements and indicates understanding: Yes  Are you in the first 12 months of your Medicare Part B coverage?  No    Physical Health:    In general, how would you rate your overall physical health? good    Outside of work, how many days during the week do you exercise? none, just stays busy    Outside of work, approximately how many minutes a day do you exercise?not applicable    If you drink alcohol do you typically have >3 drinks per day or >7 drinks per week? No    Do you usually eat at least 4 servings of fruit and vegetables a day, include whole grains & fiber and avoid regularly eating high fat or \"junk\" foods? NO    Do you have any problems taking medications regularly?  No    Do you have any side effects from medications? significant flushing (hotflashes)    Needs assistance for the following daily activities: no assistance needed    Which of the following safety concerns are present in your home?  throw rugs in the hallway and lack of grab bars in the bathroom     Hearing impairment: No    In the past 6 months, have you been bothered by leaking of urine? Yes, for a long time    Mental Health:    In general, how would you rate your overall mental or emotional health? good  PHQ-2 Score: 1    Do you feel safe in your environment? Yes    Have you ever done Advance Care Planning? (For example, a Health Directive, POLST, or a discussion with a medical provider or your loved ones about your wishes): Yes, advance care planning is on file.    Additional concerns to address?  YES    Fall risk:  Fallen 2 or more times in the past year?: No  Any fall with injury in the past year?: No    Cognitive Screenin) Repeat 3 items (Leader, Season, Table)    2) Clock draw: NORMAL  3) 3 item recall: Recalls 1 object   Results: NORMAL clock, 1-2 items recalled: COGNITIVE IMPAIRMENT LESS " LIKELY    Mini-CogTM Copyright S Windy. Licensed by the author for use in [facility]; reprinted with permission (chiquita@[facility]). All rights reserved.      Do you have sleep apnea, excessive snoring or daytime drowsiness?: no      Hypothyroidism Follow-up      Since last visit, patient describes the following symptoms: weight loss of 6 lbs, constipation and fatigue    Reports not taking her thyroid medication this morning. Usually takes it daily without food. Last time TSH checked 3/9/2020 with a TSH of 3.25, free T4 1.19.  Currently on levothyroxine 75 mcg daily.     Reports taking various vitamins as supplements. She wants to ensure her levels are not too high or low. Will obtain lab testing today.       Blood pressure elevation.  Reports she is not interested in blood pressure medication at this time.   No chest pain  Or shortness of breath. Discussed importance of blood pressure control. Reports it is always elevated at the doctors office but normal at home.     Tobacco use  Currently smoking 1/2 ppd. Not interested in quitting at this time.       Reviewed and updated as needed this visit by clinical staff  Tobacco  Allergies  Meds  Problems  Med Hx  Surg Hx  Fam Hx  Soc Hx          Reviewed and updated as needed this visit by Provider  Tobacco  Allergies  Meds  Problems  Med Hx  Surg Hx  Fam Hx         Social History     Tobacco Use     Smoking status: Current Some Day Smoker     Packs/day: 0.50     Years: 50.00     Pack years: 25.00     Types: Cigarettes     Smokeless tobacco: Never Used   Substance Use Topics     Alcohol use: Not Currently     Comment: rare                           Current providers sharing in care for this patient include:   Patient Care Team:  Hi Gaitan MD as PCP - General (Family Practice)  Juan Guzman MD as Assigned PCP    The following health maintenance items are reviewed in Epic and correct as of today:  Health Maintenance   Topic Date  "Due     ANNUAL REVIEW OF HM ORDERS  Never done     Pneumococcal Vaccine: 65+ Years (1 of 2 - PPSV23) Never done     HEPATITIS C SCREENING  Never done     ZOSTER IMMUNIZATION (1 of 2) Never done     DTAP/TDAP/TD IMMUNIZATION (2 - Td) 12/22/2002     FALL RISK ASSESSMENT  02/06/2018     LIPID  04/14/2021     DEXA  10/16/2021     MEDICARE ANNUAL WELLNESS VISIT  05/20/2022     ADVANCE CARE PLANNING  05/20/2026     PHQ-2  Completed     INFLUENZA VACCINE  Completed     COVID-19 Vaccine  Completed     IPV IMMUNIZATION  Aged Out     MENINGITIS IMMUNIZATION  Aged Out     HEPATITIS B IMMUNIZATION  Aged Out         ROS:  Constitutional, HEENT, cardiovascular, pulmonary, gi and gu systems are negative, except as otherwise noted.    OBJECTIVE:   BP (!) 150/80 (BP Location: Left arm, Patient Position: Chair, Cuff Size: Adult Regular)   Pulse 58   Temp 97.1  F (36.2  C) (Tympanic)   Resp 16   Ht 1.607 m (5' 3.25\")   Wt 68 kg (150 lb)   LMP  (LMP Unknown)   SpO2 96%   BMI 26.36 kg/m   Estimated body mass index is 26.36 kg/m  as calculated from the following:    Height as of this encounter: 1.607 m (5' 3.25\").    Weight as of this encounter: 68 kg (150 lb).  EXAM:   General: Alert, cooperative, no acute distress  Head: Normocephalic, atraumatic   Eyes: PERRL, no scleral icterus, no conjunctival injection   Ears: External ear without deformity, ear canals tortuous.    Nose: nares patent  Throat: Oropharynx clear, non-erythematous, tonsils non-enlarged   Neck: supple, trachea midline, no goiter   CV: RRR, no murmur   Lungs: Clear, non-labored breathing, No rales or rhonchi   Abdomen: Bowel sounds active, soft, non-tender, no guarding.   Extremities: No peripheral edema, calves non-tender   MSK: strength intact in upper and lower extremities. Gait normal.   Neuro: CN II-XII grossly intact. No focal deficits. Sensation intact.       ASSESSMENT / PLAN:   Catherine was seen today for physical and allergies.    Diagnoses and all orders " for this visit:    Encounter for Medicare annual wellness exam  Declines vaccines today.   Last colonscopy in 2005. No further screening     Vitamin supplementation   Currently on vitamins, patient wishes to have levels checked.   -     Iron; Future  -     Ferritin; Future  -     Vitamin B12; Future  -     Folate; Future    Hypothyroidism, unspecified type  Currently taking levothyroxine 75 mcg daily.  Is due for recheck.  Missed dose this morning.  Will recheck next week.  Dosage adjustments as appropriate after lab results.   -     TSH with free T4 reflex; Future    Hypertension goal BP (blood pressure) < 140/90  Has been high for numerous years. Refuses medications at this time. Reports only high at the doctors office. Discussed importance of blood pressure control. Advised low salt diet, and exercise.  She will check blood pressure at home and keep a record of this.  If persistently elevated greater than 140/90 she will consider medications but at this time refuses.  Nurse blood pressure check next week.  If elevated will need an office visit for further discussions regarding elevated blood pressure.    Hyperlipidemia LDL goal <130  -Refuses to be on a statin medication.    Bee sting reaction, accidental or unintentional, initial encounter  Needs EpiPen refill.  Ordered today.  -     Discontinue: EPINEPHrine (ADRENACLICK) 0.3 MG/0.3ML injection 2-pack; Inject 0.3 mLs (0.3 mg) into the muscle as needed for anaphylaxis  -     EPINEPHrine (ADRENACLICK) 0.3 MG/0.3ML injection 2-pack; Inject 0.3 mLs (0.3 mg) into the muscle as needed for anaphylaxis    Tobacco Abuse  Not interested in quitting at this time.     Patient has been advised of split billing requirements and indicates understanding: Yes    COUNSELING:  Reviewed preventive health counseling, as reflected in patient instructions       Regular exercise       Healthy diet/nutrition       Dental care       Alcohol Use        Osteoporosis prevention/bone  "health    Estimated body mass index is 26.36 kg/m  as calculated from the following:    Height as of this encounter: 1.607 m (5' 3.25\").    Weight as of this encounter: 68 kg (150 lb).    Weight management plan: Discussed healthy diet and exercise guidelines    She reports that she has been smoking cigarettes. She has a 25.00 pack-year smoking history. She has never used smokeless tobacco.  Tobacco Cessation Action Plan:       Appropriate preventive services were discussed with this patient, including applicable screening as appropriate for cardiovascular disease, diabetes, osteopenia/osteoporosis, and glaucoma.  As appropriate for age/gender, discussed screening for colorectal cancer, prostate cancer, breast cancer, and cervical cancer. Checklist reviewing preventive services available has been given to the patient.    Reviewed patients plan of care and provided an AVS. The Basic Care Plan (routine screening as documented in Health Maintenance) for Catherine meets the Care Plan requirement. This Care Plan has been established and reviewed with the Patient.    Counseling Resources:  ATP IV Guidelines  Pooled Cohorts Equation Calculator  Breast Cancer Risk Calculator  BRCA-Related Cancer Risk Assessment: FHS-7 Tool  FRAX Risk Assessment  ICSI Preventive Guidelines  Dietary Guidelines for Americans, 2010  USDA's MyPlate  ASA Prophylaxis  Lung CA Screening    Timmy Garcia DO  [facility]  "

## 2021-05-28 ENCOUNTER — ALLIED HEALTH/NURSE VISIT (OUTPATIENT)
Dept: FAMILY MEDICINE | Facility: CLINIC | Age: 81
End: 2021-05-28
Payer: MEDICARE

## 2021-05-28 ENCOUNTER — TELEPHONE (OUTPATIENT)
Dept: FAMILY MEDICINE | Facility: CLINIC | Age: 81
End: 2021-05-28

## 2021-05-28 VITALS — SYSTOLIC BLOOD PRESSURE: 142 MMHG | HEART RATE: 64 BPM | DIASTOLIC BLOOD PRESSURE: 82 MMHG

## 2021-05-28 DIAGNOSIS — E03.9 HYPOTHYROIDISM, UNSPECIFIED TYPE: ICD-10-CM

## 2021-05-28 DIAGNOSIS — Z00.00 ENCOUNTER FOR MEDICARE ANNUAL WELLNESS EXAM: ICD-10-CM

## 2021-05-28 DIAGNOSIS — I10 HYPERTENSION GOAL BP (BLOOD PRESSURE) < 140/90: Primary | ICD-10-CM

## 2021-05-28 LAB
FERRITIN SERPL-MCNC: 177 NG/ML (ref 8–252)
FOLATE SERPL-MCNC: >100 NG/ML
IRON SERPL-MCNC: 63 UG/DL (ref 35–180)
TSH SERPL DL<=0.005 MIU/L-ACNC: 3.17 MU/L (ref 0.4–4)
VIT B12 SERPL-MCNC: 885 PG/ML (ref 193–986)

## 2021-05-28 PROCEDURE — 82607 VITAMIN B-12: CPT | Performed by: FAMILY MEDICINE

## 2021-05-28 PROCEDURE — 36415 COLL VENOUS BLD VENIPUNCTURE: CPT | Performed by: FAMILY MEDICINE

## 2021-05-28 PROCEDURE — 82746 ASSAY OF FOLIC ACID SERUM: CPT | Performed by: FAMILY MEDICINE

## 2021-05-28 PROCEDURE — 99207 PR NO CHARGE NURSE ONLY: CPT

## 2021-05-28 PROCEDURE — 84443 ASSAY THYROID STIM HORMONE: CPT | Performed by: FAMILY MEDICINE

## 2021-05-28 PROCEDURE — 82728 ASSAY OF FERRITIN: CPT | Performed by: FAMILY MEDICINE

## 2021-05-28 PROCEDURE — 83540 ASSAY OF IRON: CPT | Performed by: FAMILY MEDICINE

## 2021-05-28 NOTE — PROGRESS NOTES
Catherine Salgado is a 81 year old year old patient who comes in today for a Blood Pressure check because of ongoing blood pressure monitoring.    Pt is not interested in taking blood pressure medication.    Vital Signs as repeated by RN:  176/96, 60 pulse  142/82, 64 pulse, rechecked 10 min later.    Patient does not take blood pressure medication.    Patient is monitoring Blood Pressure at home.  Average home BP readings are 140/ low 90's.  Pt was supposed to check home readings but did not due to too stressed this past week due to bathroom construction project.    Current complaints: none  Disposition:  Routed to Dr Garcia.    Pt get lab work today after BP visit.    Fern Waite RN

## 2021-06-02 NOTE — RESULT ENCOUNTER NOTE
Please call patient.     Most recent labs have returned satisfactory. Folate is fairly high and patient could consider decreasing folate supplementation.     Dr. Timmy Garcia

## 2021-12-27 ENCOUNTER — TELEPHONE (OUTPATIENT)
Dept: ENDOCRINOLOGY | Facility: CLINIC | Age: 81
End: 2021-12-27
Payer: COMMERCIAL

## 2021-12-27 NOTE — TELEPHONE ENCOUNTER
[facility] Call Center    Phone Message    May a detailed message be left on voicemail: yes     Reason for Call: Other: patient would like to connect with care team about the medcaition levothyroxine (SYNTHROID/LEVOTHROID) 75 MCG tablet -would first like to know if its helping and get updated lab work done.      Action Taken: Other: ENDO    Travel Screening: Not Applicable

## 2021-12-27 NOTE — TELEPHONE ENCOUNTER
Pt states she has 1 month for time.  Pt will make appt with Dr Guzman and we can ensure she has enough meds to last.  And then pt can discuss plan for when Dr Guzman steps away.

## 2022-01-17 DIAGNOSIS — E03.9 HYPOTHYROIDISM, UNSPECIFIED TYPE: ICD-10-CM

## 2022-01-18 RX ORDER — LEVOTHYROXINE SODIUM 75 UG/1
75 TABLET ORAL DAILY
Qty: 30 TABLET | Refills: 0 | Status: SHIPPED | OUTPATIENT
Start: 2022-01-18 | End: 2022-01-31

## 2022-01-18 NOTE — TELEPHONE ENCOUNTER
Prescription approved per [facility] Refill Protocol.    Luc ESPINO RN....1/18/2022 10:33 AM

## 2022-01-31 ENCOUNTER — OFFICE VISIT (OUTPATIENT)
Dept: ENDOCRINOLOGY | Facility: CLINIC | Age: 82
End: 2022-01-31
Payer: MEDICARE

## 2022-01-31 VITALS
SYSTOLIC BLOOD PRESSURE: 144 MMHG | DIASTOLIC BLOOD PRESSURE: 99 MMHG | TEMPERATURE: 97.1 F | WEIGHT: 152 LBS | HEIGHT: 63 IN | HEART RATE: 88 BPM | BODY MASS INDEX: 26.93 KG/M2

## 2022-01-31 DIAGNOSIS — E03.9 HYPOTHYROIDISM, UNSPECIFIED TYPE: Primary | ICD-10-CM

## 2022-01-31 DIAGNOSIS — E03.9 HYPOTHYROIDISM, UNSPECIFIED TYPE: ICD-10-CM

## 2022-01-31 LAB
T4 FREE SERPL-MCNC: 1.02 NG/DL (ref 0.76–1.46)
TSH SERPL DL<=0.005 MIU/L-ACNC: 5.68 MU/L (ref 0.4–4)

## 2022-01-31 PROCEDURE — 84439 ASSAY OF FREE THYROXINE: CPT | Performed by: INTERNAL MEDICINE

## 2022-01-31 PROCEDURE — 36415 COLL VENOUS BLD VENIPUNCTURE: CPT | Performed by: INTERNAL MEDICINE

## 2022-01-31 PROCEDURE — 99214 OFFICE O/P EST MOD 30 MIN: CPT | Performed by: INTERNAL MEDICINE

## 2022-01-31 PROCEDURE — 84443 ASSAY THYROID STIM HORMONE: CPT | Performed by: INTERNAL MEDICINE

## 2022-01-31 RX ORDER — LEVOTHYROXINE SODIUM 75 UG/1
75 TABLET ORAL DAILY
Qty: 90 TABLET | Refills: 3 | Status: SHIPPED | OUTPATIENT
Start: 2022-01-31 | End: 2022-05-09

## 2022-01-31 RX ORDER — PLANT STANOL ESTER 450 MG
1 TABLET ORAL DAILY
COMMUNITY
Start: 2022-01-31 | End: 2024-08-07

## 2022-01-31 ASSESSMENT — MIFFLIN-ST. JEOR: SCORE: 1133.6

## 2022-01-31 NOTE — PROGRESS NOTES
"S:   Patient presents for evaluation of hypothyroidism.   Initial diagnosis was ~5 years ago.     Initially used [place] and then after 18 months told she was \"cured\" and stopped it.   Then a few years later asked to be re-tested and found to be hypothyroid.   Started on levothyroxine. Taking 50 mcg daily.   Wakes at 0400 to take it. Takes without any other pills.   Eats breakfast at 1000.     Feels the medication is not working.   C/O low energy.   Trouble falling asleep. Mind racing.   She has a history of poor sleep. Only 5-6 hours a night.   Tosses and turns.   Snores occasionally.   No prior sleep study.     She gets rare hot flashes. Was prescribed estradiol but did not take as she smokes.     No black or bloody stools.  She does take iron but was never told she had ASHLEY.     No palpitations or SOB.     She currently does not have a primary care MD.     Returns in 3/2020, c/o no change in hoarseness.   No recent illness. No GERD. She does have seasonal allergies.   She has to clear her throat a lot.   Smokes 1/2 PPD.     She has begun have issues with constipation. Resolves with ingestion of prunes.   No diarrhea or recent illness.     Trouble staying awake if she does not remain active.   Her sleep has not changed since last visit. She does note that she tends to sleep better if she exercises that day.     In 1/2022, began having episodes of diaphoresis and insomnia.   Diaphoresis began a few months ago and unrelated to activity.   Insomnia began a few weeks ago.     She read on line and began taking 5 drops of iodine every day for ~ 1 month. Continues 75 mcg levothyroxine every day.     Weight stable. No n/v/d.     ROS: 10 point ROS neg other than the symptoms noted above in the HPI.    Exam:   Vital signs:  Temp: 97.1  F (36.2  C) Temp src: Tympanic BP: (!) 144/99 Pulse: 88           Height: 160 cm (5' 3\") Weight: 68.9 kg (152 lb)  Estimated body mass index is 26.93 kg/m  as calculated from the following:    " "Height as of this encounter: 1.6 m (5' 3\").    Weight as of this encounter: 68.9 kg (152 lb).  Gen: In NAD.   HEENT: no proptosis or lid lag, EOMI, no palpable thyroid tissue.   Card: S1 S2 RRR no m/r/g. no LE edema.   Pulm: CTA b/l.   GI: NT ND +BS.   MSK: no gross deformities.   Derm: no rashes or lesions.   Neuro: no tremor, +2 DTR's.     A/P:   Hypothyroidism - Extensive discussion of thyroid hormone and normal physiology. Included was discussion of thyroid in relation to weight and energy. Last TSH from 3/2019 was normal. She has poor sleep patterns and I explained the need to address these. She has convinced herself that all her problems are due to her thyroid.   Her TSH was elevated in 1/2020 with normal T4 and T3. Levothyroxine was increased from 50 to 75 mcg daily.   In 3/2020, symptoms largely unchanged and she is worried her hypothyroidism is causing hoarseness. She does have some evidence of mild congestion on exam. She is remodeling her home and might be having irritation from particulate matter (dust).   She read on line and began taking 5 drops of iodine every day for ~ 1 month.  Long discussion of dangers of taking iodine supplements.   -No change to medication today. We will adjust based on lab results.   -Stop taking the iodine supplement.   -RX REFILL NEEDED AFTER LABS.     Juan Guzman MD on 1/31/2022 at 9:36 AM    "

## 2022-01-31 NOTE — NURSING NOTE
Patient having some thyroid sx that may be needing a dose change per patient.  Madelin HESS   [facility] RN

## 2022-01-31 NOTE — LETTER
"    1/31/2022         RE: Catherine Salgado  [address] [id number]        Dear Colleague,    Thank you for referring your patient, Catherine Salgado, to the [facility] ENDOCR[facility]. Please see a copy of my visit note below.    S:   Patient presents for evaluation of hypothyroidism.   Initial diagnosis was ~5 years ago.     Initially used [place] and then after 18 months told she was \"cured\" and stopped it.   Then a few years later asked to be re-tested and found to be hypothyroid.   Started on levothyroxine. Taking 50 mcg daily.   Wakes at 0400 to take it. Takes without any other pills.   Eats breakfast at 1000.     Feels the medication is not working.   C/O low energy.   Trouble falling asleep. Mind racing.   She has a history of poor sleep. Only 5-6 hours a night.   Tosses and turns.   Snores occasionally.   No prior sleep study.     She gets rare hot flashes. Was prescribed estradiol but did not take as she smokes.     No black or bloody stools.  She does take iron but was never told she had ASHLEY.     No palpitations or SOB.     She currently does not have a primary care MD.     Returns in 3/2020, c/o no change in hoarseness.   No recent illness. No GERD. She does have seasonal allergies.   She has to clear her throat a lot.   Smokes 1/2 PPD.     She has begun have issues with constipation. Resolves with ingestion of prunes.   No diarrhea or recent illness.     Trouble staying awake if she does not remain active.   Her sleep has not changed since last visit. She does note that she tends to sleep better if she exercises that day.     In 1/2022, began having episodes of diaphoresis and insomnia.   Diaphoresis began a few months ago and unrelated to activity.   Insomnia began a few weeks ago.     She read on line and began taking 5 drops of iodine every day for ~ 1 month. Continues 75 mcg levothyroxine every day.     Weight stable. No n/v/d.     ROS: 10 point ROS neg other than the " "symptoms noted above in the HPI.    Exam:   Vital signs:  Temp: 97.1  F (36.2  C) Temp src: Tympanic BP: (!) 144/99 Pulse: 88           Height: 160 cm (5' 3\") Weight: 68.9 kg (152 lb)  Estimated body mass index is 26.93 kg/m  as calculated from the following:    Height as of this encounter: 1.6 m (5' 3\").    Weight as of this encounter: 68.9 kg (152 lb).  Gen: In NAD.   HEENT: no proptosis or lid lag, EOMI, no palpable thyroid tissue.   Card: S1 S2 RRR no m/r/g. no LE edema.   Pulm: CTA b/l.   GI: NT ND +BS.   MSK: no gross deformities.   Derm: no rashes or lesions.   Neuro: no tremor, +2 DTR's.     A/P:   Hypothyroidism - Extensive discussion of thyroid hormone and normal physiology. Included was discussion of thyroid in relation to weight and energy. Last TSH from 3/2019 was normal. She has poor sleep patterns and I explained the need to address these. She has convinced herself that all her problems are due to her thyroid.   Her TSH was elevated in 1/2020 with normal T4 and T3. Levothyroxine was increased from 50 to 75 mcg daily.   In 3/2020, symptoms largely unchanged and she is worried her hypothyroidism is causing hoarseness. She does have some evidence of mild congestion on exam. She is remodeling her home and might be having irritation from particulate matter (dust).   She read on line and began taking 5 drops of iodine every day for ~ 1 month.  Long discussion of dangers of taking iodine supplements.   -No change to medication today. We will adjust based on lab results.   -Stop taking the iodine supplement.   -RX REFILL NEEDED AFTER LABS.     Juan Guzman MD on 1/31/2022 at 9:36 AM        Again, thank you for allowing me to participate in the care of your patient.        Sincerely,        Juan Guzman MD    "

## 2022-05-03 ENCOUNTER — OFFICE VISIT (OUTPATIENT)
Dept: FAMILY MEDICINE | Facility: CLINIC | Age: 82
End: 2022-05-03
Payer: MEDICARE

## 2022-05-03 DIAGNOSIS — Z86.72 PERSONAL HISTORY OF THROMBOPHLEBITIS: ICD-10-CM

## 2022-05-03 DIAGNOSIS — Z13.220 SCREENING FOR HYPERLIPIDEMIA: ICD-10-CM

## 2022-05-03 DIAGNOSIS — E03.9 HYPOTHYROIDISM, UNSPECIFIED TYPE: Primary | ICD-10-CM

## 2022-05-03 LAB
ANION GAP SERPL CALCULATED.3IONS-SCNC: 7 MMOL/L (ref 3–14)
BUN SERPL-MCNC: 17 MG/DL (ref 7–30)
CALCIUM SERPL-MCNC: 9.5 MG/DL (ref 8.5–10.1)
CHLORIDE BLD-SCNC: 105 MMOL/L (ref 94–109)
CHOLEST SERPL-MCNC: 271 MG/DL
CO2 SERPL-SCNC: 29 MMOL/L (ref 20–32)
CREAT SERPL-MCNC: 0.83 MG/DL (ref 0.52–1.04)
ERYTHROCYTE [DISTWIDTH] IN BLOOD BY AUTOMATED COUNT: 15.1 % (ref 10–15)
FASTING STATUS PATIENT QL REPORTED: NO
GFR SERPL CREATININE-BSD FRML MDRD: 71 ML/MIN/1.73M2
GLUCOSE BLD-MCNC: 111 MG/DL (ref 70–99)
HCT VFR BLD AUTO: 46.1 % (ref 35–47)
HDLC SERPL-MCNC: 63 MG/DL
HGB BLD-MCNC: 15.3 G/DL (ref 11.7–15.7)
LDLC SERPL CALC-MCNC: 183 MG/DL
MCH RBC QN AUTO: 30.9 PG (ref 26.5–33)
MCHC RBC AUTO-ENTMCNC: 33.2 G/DL (ref 31.5–36.5)
MCV RBC AUTO: 93 FL (ref 78–100)
NONHDLC SERPL-MCNC: 208 MG/DL
PLATELET # BLD AUTO: 221 10E3/UL (ref 150–450)
POTASSIUM BLD-SCNC: 4.1 MMOL/L (ref 3.4–5.3)
RBC # BLD AUTO: 4.95 10E6/UL (ref 3.8–5.2)
SODIUM SERPL-SCNC: 141 MMOL/L (ref 133–144)
TRIGL SERPL-MCNC: 126 MG/DL
TSH SERPL DL<=0.005 MIU/L-ACNC: 3.41 MU/L (ref 0.4–4)
WBC # BLD AUTO: 6.5 10E3/UL (ref 4–11)

## 2022-05-03 PROCEDURE — 80061 LIPID PANEL: CPT | Performed by: NURSE PRACTITIONER

## 2022-05-03 PROCEDURE — 85027 COMPLETE CBC AUTOMATED: CPT | Performed by: NURSE PRACTITIONER

## 2022-05-03 PROCEDURE — 99214 OFFICE O/P EST MOD 30 MIN: CPT | Performed by: NURSE PRACTITIONER

## 2022-05-03 PROCEDURE — 86376 MICROSOMAL ANTIBODY EACH: CPT | Performed by: NURSE PRACTITIONER

## 2022-05-03 PROCEDURE — 84443 ASSAY THYROID STIM HORMONE: CPT | Performed by: NURSE PRACTITIONER

## 2022-05-03 PROCEDURE — 36415 COLL VENOUS BLD VENIPUNCTURE: CPT | Performed by: NURSE PRACTITIONER

## 2022-05-03 PROCEDURE — 80048 BASIC METABOLIC PNL TOTAL CA: CPT | Performed by: NURSE PRACTITIONER

## 2022-05-03 NOTE — PROGRESS NOTES
"  Assessment & Plan     (E03.9) Hypothyroidism, unspecified type  (primary encounter diagnosis)  Comment: Patient concerned with higher dose of levothyroxine that she is having some effects from it did check her thyroid levels they are within normal limits on the higher end of normal reasonable to lower her levothyroxine dose and will have her recheck in 6 weeks lab only appointment  Plan: TSH with free T4 reflex, Thyroid peroxidase         antibody, Basic metabolic panel  (Ca, Cl, CO2,         Creat, Gluc, K, Na, BUN), CBC with platelets,         levothyroxine (SYNTHROID/LEVOTHROID) 50 MCG         tablet, TSH with free T4 reflex      (Z13.220) Screening for hyperlipidemia  Comment:   Plan: Lipid panel reflex to direct LDL Fasting            (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 She has a 25.00 pack-year smoking history. She has never used smokeless tobacco.  Tobacco Cessation Action Plan: Information offered: Patient not interested at this time    BMI:   Estimated body mass index is 26.93 kg/m  as calculated from the following:    Height as of 1/31/22: 1.6 m (5' 3\").    Weight as of 1/31/22: 68.9 kg (152 lb).   Weight management plan: Discussed healthy diet and exercise guidelines    See Patient Instructions    No follow-ups on file.    ROSALEE Byrnes CNP  [facility]   Catherine is a 82 year old who presents for the following health issues     HPI     Hypothyroidism Follow-up      Since last visit, patient describes the following symptoms: hot flashes      Review of Systems   Constitutional, HEENT, cardiovascular, pulmonary, gi and gu systems are negative, except as otherwise noted.      Objective    LMP  (LMP Unknown)   There is no height or weight on file to calculate BMI.  Physical Exam   GENERAL: healthy, alert and no " distress  EYES: Eyes grossly normal to inspection, PERRL and conjunctivae and sclerae normal  NECK: no adenopathy, no asymmetry, masses, or scars and thyroid normal to palpation  RESP: lungs clear to auscultation - no rales, rhonchi or wheezes  CV: regular rate and rhythm, normal S1 S2, no S3 or S4, no murmur, click or rub, no peripheral edema and peripheral pulses strong  MS: no gross musculoskeletal defects noted, no edema  SKIN: no suspicious lesions or rashes  NEURO: Normal strength and tone, mentation intact and speech normal  PSYCH: mentation appears normal, affect normal/bright    Results for orders placed or performed in visit on 05/03/22   CBC with platelets     Status: Abnormal   Result Value Ref Range    WBC Count 6.5 4.0 - 11.0 10e3/uL    RBC Count 4.95 3.80 - 5.20 10e6/uL    Hemoglobin 15.3 11.7 - 15.7 g/dL    Hematocrit 46.1 35.0 - 47.0 %    MCV 93 78 - 100 fL    MCH 30.9 26.5 - 33.0 pg    MCHC 33.2 31.5 - 36.5 g/dL    RDW 15.1 (H) 10.0 - 15.0 %    Platelet Count 221 150 - 450 10e3/uL   Basic metabolic panel  (Ca, Cl, CO2, Creat, Gluc, K, Na, BUN)     Status: Abnormal   Result Value Ref Range    Sodium 141 133 - 144 mmol/L    Potassium 4.1 3.4 - 5.3 mmol/L    Chloride 105 94 - 109 mmol/L    Carbon Dioxide (CO2) 29 20 - 32 mmol/L    Anion Gap 7 3 - 14 mmol/L    Urea Nitrogen 17 7 - 30 mg/dL    Creatinine 0.83 0.52 - 1.04 mg/dL    Calcium 9.5 8.5 - 10.1 mg/dL    Glucose 111 (H) 70 - 99 mg/dL    GFR Estimate 71 >60 mL/min/1.73m2   Thyroid peroxidase antibody     Status: Normal   Result Value Ref Range    Thyroid Peroxidase Antibody <10 <35 IU/mL   TSH with free T4 reflex     Status: Normal   Result Value Ref Range    TSH 3.41 0.40 - 4.00 mU/L   Lipid panel reflex to direct LDL Fasting     Status: Abnormal   Result Value Ref Range    Cholesterol 271 (H) <200 mg/dL    Triglycerides 126 <150 mg/dL    Direct Measure HDL 63 >=50 mg/dL    LDL Cholesterol Calculated 183 (H) <=100 mg/dL    Non HDL Cholesterol 208  (H) <130 mg/dL    Patient Fasting > 8hrs? No     Narrative    Cholesterol  Desirable:  <200 mg/dL    Triglycerides  Normal:  Less than 150 mg/dL  Borderline High:  150-199 mg/dL  High:  200-499 mg/dL  Very High:  Greater than or equal to 500 mg/dL    Direct Measure HDL  Female:  Greater than or equal to 50 mg/dL   Male:  Greater than or equal to 40 mg/dL    LDL Cholesterol  Desirable:  <100mg/dL  Above Desirable:  100-129 mg/dL   Borderline High:  130-159 mg/dL   High:  160-189 mg/dL   Very High:  >= 190 mg/dL    Non HDL Cholesterol  Desirable:  130 mg/dL  Above Desirable:  130-159 mg/dL  Borderline High:  160-189 mg/dL  High:  190-219 mg/dL  Very High:  Greater than or equal to 220 mg/dL

## 2022-05-04 LAB — THYROPEROXIDASE AB SERPL-ACNC: <10 IU/ML

## 2022-05-05 RX ORDER — LEVOTHYROXINE SODIUM 50 UG/1
50 TABLET ORAL DAILY
Qty: 90 TABLET | Refills: 0 | Status: SHIPPED | OUTPATIENT
Start: 2022-05-05 | End: 2022-08-03

## 2022-05-09 ENCOUNTER — TELEPHONE (OUTPATIENT)
Dept: FAMILY MEDICINE | Facility: CLINIC | Age: 82
End: 2022-05-09
Payer: COMMERCIAL

## 2022-05-09 DIAGNOSIS — E03.9 HYPOTHYROIDISM, UNSPECIFIED TYPE: ICD-10-CM

## 2022-05-09 NOTE — TELEPHONE ENCOUNTER
Reviewed again with patient decreasing levothyroxine to 50 mcg and have a lab only appointment in 6 weeks    Danielle Steele CNP

## 2022-05-09 NOTE — TELEPHONE ENCOUNTER
Reason for Call:  Other     Detailed comments: Catherine says she needs to talk to Danielle Steele about her thyroid. She says she was going to talk to her but then [place].    Phone Number Patient can be reached at: Home number on file [phone] (home)    Best Time: Prefers this morning    Can we leave a detailed message on this number? YES    Call taken on 5/9/2022 at 7:20 AM by Britney Blas

## 2022-05-26 ENCOUNTER — DOCUMENTATION ONLY (OUTPATIENT)
Dept: LAB | Facility: CLINIC | Age: 82
End: 2022-05-26
Payer: COMMERCIAL

## 2022-05-26 NOTE — PROGRESS NOTES
Catherine Salgado has an upcoming lab appointment:    Future Appointments   Date Time Provider Department Center   6/2/2022 10:00 AM [facility] LAB [facility]   6/7/2022  9:30 AM Danielle Steele APRN CNP [facility]     Patient is scheduled for the following lab(s): no available orders, the TSH in chart is expected 11/2022    There is no order available. Please review and place either future orders or HMPO (Review of Health Maintenance Protocol Orders), as appropriate.    Health Maintenance Due   Topic     ANNUAL REVIEW OF HM ORDERS      Yulia Pena

## 2022-06-01 NOTE — PROGRESS NOTES
Has order for TSH recheck, okay to recheck it now? Was supposed to recheck in 6 weeks and it has only been 4 weeks

## 2022-06-01 NOTE — PROGRESS NOTES
Please review and place orders. Patient is coming in tomorrow 6/2/22. Thank you.    Yulia Pena on 6/1/2022 at 11:18 AM

## 2022-08-02 ENCOUNTER — TELEPHONE (OUTPATIENT)
Dept: FAMILY MEDICINE | Facility: CLINIC | Age: 82
End: 2022-08-02

## 2022-08-02 DIAGNOSIS — E03.9 HYPOTHYROIDISM, UNSPECIFIED TYPE: ICD-10-CM

## 2022-08-02 NOTE — TELEPHONE ENCOUNTER
Reason for Call:  Other appointment    Detailed comments: Patient has scheduled an appt for a thyroid check on 8/18, but states she will run out of her meds by then. I explained that a refill may be possible before time of appt, but she seems to be concerned about that in case the dosage needs to be reviewed and changed.    She would like to know if it's at all possible to be seen sooner that what she has scheduled.    Phone Number Patient can be reached at: Cell number on file:    No relevant phone numbers on file.       Best Time: Any time.    Can we leave a detailed message on this number? Not Applicable    Call taken on 8/2/2022 at 2:15 PM by Bertrand Luciano

## 2022-08-02 NOTE — TELEPHONE ENCOUNTER
Left message to call back, we can't get her in sooner with katherine but we can call in just a small supply of her thyroid medication

## 2022-08-03 RX ORDER — LEVOTHYROXINE SODIUM 50 UG/1
50 TABLET ORAL DAILY
Qty: 14 TABLET | Refills: 0 | Status: SHIPPED | OUTPATIENT
Start: 2022-08-03 | End: 2022-08-19 | Stop reason: ALTCHOICE

## 2022-08-18 ENCOUNTER — OFFICE VISIT (OUTPATIENT)
Dept: FAMILY MEDICINE | Facility: CLINIC | Age: 82
End: 2022-08-18
Payer: MEDICARE

## 2022-08-18 DIAGNOSIS — I10 BENIGN ESSENTIAL HYPERTENSION: ICD-10-CM

## 2022-08-18 DIAGNOSIS — Z78.0 MENOPAUSE: ICD-10-CM

## 2022-08-18 DIAGNOSIS — E03.9 HYPOTHYROIDISM, UNSPECIFIED TYPE: Primary | ICD-10-CM

## 2022-08-18 LAB
T4 FREE SERPL-MCNC: 0.96 NG/DL (ref 0.76–1.46)
TSH SERPL DL<=0.005 MIU/L-ACNC: 7.69 MU/L (ref 0.4–4)

## 2022-08-18 PROCEDURE — 84439 ASSAY OF FREE THYROXINE: CPT | Performed by: NURSE PRACTITIONER

## 2022-08-18 PROCEDURE — 36415 COLL VENOUS BLD VENIPUNCTURE: CPT | Performed by: NURSE PRACTITIONER

## 2022-08-18 PROCEDURE — 84443 ASSAY THYROID STIM HORMONE: CPT | Performed by: NURSE PRACTITIONER

## 2022-08-18 PROCEDURE — 99214 OFFICE O/P EST MOD 30 MIN: CPT | Performed by: NURSE PRACTITIONER

## 2022-08-18 ASSESSMENT — PAIN SCALES - GENERAL: PAINLEVEL: NO PAIN (0)

## 2022-08-18 NOTE — PROGRESS NOTES
"  Assessment & Plan     (E03.9) Hypothyroidism, unspecified type  (primary encounter diagnosis)  Comment: uncontrolled will increase levothyroxine to 75 mcg  recheck thyroid in 6-8 week lab only appointment    Plan: REVIEW OF HEALTH MAINTENANCE PROTOCOL ORDERS,         TSH with free T4 reflex, T4 free      (Z78.0) Menopause  Comment:   Plan: DEXA HIP/PELVIS/SPINE - Future            (I10) Benign essential hypertension  Comment: uncontrolled will start lisinopril   Plan: lisinopril (ZESTRIL) 20 MG tablet       No follow-ups on file.    Danielle Steele, ROSALEE CNP  [facility]    Chelsey Alexander is a 82 year old, presenting for the following health issues:  Thyroid Problem      HPI     Hypothyroidism Follow-up      Since last visit, patient describes the following symptoms: Weight stable, no hair loss, no skin changes, no constipation, no loose stools        Review of Systems   Constitutional, HEENT, cardiovascular, pulmonary, gi and gu systems are negative, except as otherwise noted.      Objective    LMP  (LMP Unknown)   There is no height or weight on file to calculate BMI. BP (!) 168/98 (BP Location: Right arm, Cuff Size: Adult Regular)   Pulse 76   Temp 97  F (36.1  C) (Tympanic)   Resp 18   Ht 1.6 m (5' 3\")   Wt 67.1 kg (148 lb)   LMP  (LMP Unknown)   BMI 26.22 kg/m      Physical Exam   GENERAL: healthy, alert and no distress  EYES: Eyes grossly normal to inspection, PERRL and conjunctivae and sclerae normal  HENT: ear canals and TM's normal, nose and mouth without ulcers or lesions  NECK: no adenopathy, no asymmetry, masses, or scars and thyroid normal to palpation  RESP: lungs clear to auscultation - no rales, rhonchi or wheezes  CV: regular rate and rhythm, normal S1 S2, no S3 or S4, no murmur, click or rub, no peripheral edema and peripheral pulses strong  MS: no gross musculoskeletal defects noted, no edema  SKIN: no suspicious lesions or rashes  NEURO: Normal strength and " tone, mentation intact and speech normal  PSYCH: mentation appears normal, affect normal/bright    Results for orders placed or performed in visit on 08/18/22   TSH with free T4 reflex     Status: Abnormal   Result Value Ref Range    TSH 7.69 (H) 0.40 - 4.00 mU/L   T4 free     Status: Normal   Result Value Ref Range    Free T4 0.96 0.76 - 1.46 ng/dL

## 2022-08-19 DIAGNOSIS — E03.9 HYPOTHYROIDISM, UNSPECIFIED TYPE: Primary | ICD-10-CM

## 2022-08-19 RX ORDER — LEVOTHYROXINE SODIUM 75 UG/1
75 TABLET ORAL DAILY
Qty: 90 TABLET | Refills: 0 | Status: SHIPPED | OUTPATIENT
Start: 2022-08-19 | End: 2022-12-28

## 2022-08-22 VITALS
HEIGHT: 63 IN | BODY MASS INDEX: 26.22 KG/M2 | TEMPERATURE: 97 F | RESPIRATION RATE: 18 BRPM | HEART RATE: 76 BPM | SYSTOLIC BLOOD PRESSURE: 161 MMHG | DIASTOLIC BLOOD PRESSURE: 94 MMHG | WEIGHT: 148 LBS

## 2022-08-22 RX ORDER — LISINOPRIL 20 MG/1
20 TABLET ORAL DAILY
Qty: 90 TABLET | Refills: 3 | Status: SHIPPED | OUTPATIENT
Start: 2022-08-22 | End: 2023-05-16

## 2022-11-24 ENCOUNTER — NURSE TRIAGE (OUTPATIENT)
Dept: NURSING | Facility: CLINIC | Age: 82
End: 2022-11-24

## 2022-11-24 DIAGNOSIS — E03.9 HYPOTHYROIDISM, UNSPECIFIED TYPE: Primary | ICD-10-CM

## 2022-11-24 NOTE — TELEPHONE ENCOUNTER
Pt is wanting a message left for her provider     Pt is phoning stating that she needs a refill on her Levothyroxine     levothyroxine (SYNTHROID/LEVOTHROID) 75 MCG tablet 90 tablet 0 8/19/2022  --   Sig - Route: Take 1 tablet (75 mcg) by mouth daily - Oral   Sent to pharmacy as: Levothyroxine Sodium 75 MCG Oral Tablet (SYNTHROID/LEVOTHROID)   Class: E-Prescribe   Order: [id number]   E-Prescribing Status: Receipt confirmed by pharmacy (8/19/2022  7:51 AM CDT)     Pt would like Rx called into [facility] please and is wondering if you need her to have a blood test now     Pt can be reached at [phone]    No triage     Breanna Doran RN  [place] Nurse Advisor  11:09 AM 11/24/2022

## 2022-11-25 RX ORDER — LEVOTHYROXINE SODIUM 75 UG/1
75 TABLET ORAL DAILY
Qty: 30 TABLET | Refills: 0 | Status: SHIPPED | OUTPATIENT
Start: 2022-11-25 | End: 2022-12-28

## 2022-12-26 ENCOUNTER — TELEPHONE (OUTPATIENT)
Dept: FAMILY MEDICINE | Facility: CLINIC | Age: 82
End: 2022-12-26

## 2022-12-26 NOTE — TELEPHONE ENCOUNTER
General Call      Reason for Call:  Thyroid Medication    What are your questions or concerns:  Pt calling because Danielle had started her on thyroid medication a couple months ago, but cannot remember if Danielle wanted her to come in for any repeat labs to check levels. Wondering if she needs to come in for labs, has a couple days left of medication, no refills left.      Okay to leave a detailed message?: Yes at Home number on file [phone] (home)

## 2022-12-27 ENCOUNTER — LAB (OUTPATIENT)
Dept: LAB | Facility: CLINIC | Age: 82
End: 2022-12-27
Payer: MEDICARE

## 2022-12-27 ENCOUNTER — DOCUMENTATION ONLY (OUTPATIENT)
Dept: LAB | Facility: CLINIC | Age: 82
End: 2022-12-27

## 2022-12-27 DIAGNOSIS — E03.9 HYPOTHYROIDISM, UNSPECIFIED TYPE: ICD-10-CM

## 2022-12-27 LAB
T4 FREE SERPL-MCNC: 1.29 NG/DL (ref 0.9–1.7)
TSH SERPL DL<=0.005 MIU/L-ACNC: 0.09 UIU/ML (ref 0.3–4.2)

## 2022-12-27 PROCEDURE — 84443 ASSAY THYROID STIM HORMONE: CPT

## 2022-12-27 PROCEDURE — 84439 ASSAY OF FREE THYROXINE: CPT

## 2022-12-27 PROCEDURE — 36415 COLL VENOUS BLD VENIPUNCTURE: CPT

## 2022-12-28 DIAGNOSIS — E03.9 HYPOTHYROIDISM, UNSPECIFIED TYPE: Primary | ICD-10-CM

## 2022-12-28 RX ORDER — LEVOTHYROXINE SODIUM 50 UG/1
50 TABLET ORAL DAILY
Qty: 90 TABLET | Refills: 0 | Status: SHIPPED | OUTPATIENT
Start: 2022-12-28 | End: 2023-02-16

## 2023-02-13 ENCOUNTER — LAB (OUTPATIENT)
Dept: LAB | Facility: CLINIC | Age: 83
End: 2023-02-13
Payer: MEDICARE

## 2023-02-13 DIAGNOSIS — E03.9 HYPOTHYROIDISM, UNSPECIFIED TYPE: ICD-10-CM

## 2023-02-13 LAB
T4 FREE SERPL-MCNC: 1.03 NG/DL (ref 0.9–1.7)
TSH SERPL DL<=0.005 MIU/L-ACNC: 5.02 UIU/ML (ref 0.3–4.2)

## 2023-02-13 PROCEDURE — 84439 ASSAY OF FREE THYROXINE: CPT

## 2023-02-13 PROCEDURE — 36415 COLL VENOUS BLD VENIPUNCTURE: CPT

## 2023-02-13 PROCEDURE — 84443 ASSAY THYROID STIM HORMONE: CPT

## 2023-02-16 DIAGNOSIS — E03.9 HYPOTHYROIDISM, UNSPECIFIED TYPE: Primary | ICD-10-CM

## 2023-02-16 RX ORDER — LEVOTHYROXINE SODIUM 75 UG/1
75 TABLET ORAL DAILY
Qty: 90 TABLET | Refills: 0 | Status: SHIPPED | OUTPATIENT
Start: 2023-02-16 | End: 2023-05-16

## 2023-05-01 ENCOUNTER — TELEPHONE (OUTPATIENT)
Dept: FAMILY MEDICINE | Facility: CLINIC | Age: 83
End: 2023-05-01
Payer: MEDICARE

## 2023-05-01 NOTE — TELEPHONE ENCOUNTER
Patient Quality Outreach    Patient is due for the following:   Hypertension -  Hypertension follow-up visit    Next Steps:   Schedule a office visit for blood pressure check    Type of outreach:    Phone, left message for patient/parent to call back.      Questions for provider review:    None     Debi Mccarty, CMA

## 2023-05-16 ENCOUNTER — OFFICE VISIT (OUTPATIENT)
Dept: FAMILY MEDICINE | Facility: CLINIC | Age: 83
End: 2023-05-16
Payer: MEDICARE

## 2023-05-16 VITALS
WEIGHT: 143 LBS | OXYGEN SATURATION: 96 % | SYSTOLIC BLOOD PRESSURE: 138 MMHG | RESPIRATION RATE: 20 BRPM | HEART RATE: 60 BPM | HEIGHT: 63 IN | BODY MASS INDEX: 25.34 KG/M2 | TEMPERATURE: 97.2 F | DIASTOLIC BLOOD PRESSURE: 60 MMHG

## 2023-05-16 DIAGNOSIS — E03.9 HYPOTHYROIDISM, UNSPECIFIED TYPE: ICD-10-CM

## 2023-05-16 DIAGNOSIS — Z00.00 ENCOUNTER FOR MEDICARE ANNUAL WELLNESS EXAM: ICD-10-CM

## 2023-05-16 DIAGNOSIS — I10 BENIGN ESSENTIAL HYPERTENSION: ICD-10-CM

## 2023-05-16 DIAGNOSIS — L60.8 NAIL DEFORMITY: Primary | ICD-10-CM

## 2023-05-16 LAB
ANION GAP SERPL CALCULATED.3IONS-SCNC: 9 MMOL/L (ref 7–15)
BUN SERPL-MCNC: 20.8 MG/DL (ref 8–23)
CALCIUM SERPL-MCNC: 10 MG/DL (ref 8.8–10.2)
CHLORIDE SERPL-SCNC: 105 MMOL/L (ref 98–107)
CHOLEST SERPL-MCNC: 210 MG/DL
CREAT SERPL-MCNC: 0.99 MG/DL (ref 0.51–0.95)
DEPRECATED HCO3 PLAS-SCNC: 30 MMOL/L (ref 22–29)
ERYTHROCYTE [DISTWIDTH] IN BLOOD BY AUTOMATED COUNT: 14.7 % (ref 10–15)
GFR SERPL CREATININE-BSD FRML MDRD: 57 ML/MIN/1.73M2
GLUCOSE SERPL-MCNC: 100 MG/DL (ref 70–99)
HCT VFR BLD AUTO: 42.2 % (ref 35–47)
HDLC SERPL-MCNC: 49 MG/DL
HGB BLD-MCNC: 13.9 G/DL (ref 11.7–15.7)
LDLC SERPL CALC-MCNC: 125 MG/DL
MCH RBC QN AUTO: 31.2 PG (ref 26.5–33)
MCHC RBC AUTO-ENTMCNC: 32.9 G/DL (ref 31.5–36.5)
MCV RBC AUTO: 95 FL (ref 78–100)
NONHDLC SERPL-MCNC: 161 MG/DL
PLATELET # BLD AUTO: 235 10E3/UL (ref 150–450)
POTASSIUM SERPL-SCNC: 4.2 MMOL/L (ref 3.4–5.3)
RBC # BLD AUTO: 4.46 10E6/UL (ref 3.8–5.2)
SODIUM SERPL-SCNC: 144 MMOL/L (ref 136–145)
TRIGL SERPL-MCNC: 179 MG/DL
TSH SERPL DL<=0.005 MIU/L-ACNC: 3.35 UIU/ML (ref 0.3–4.2)
WBC # BLD AUTO: 6.1 10E3/UL (ref 4–11)

## 2023-05-16 PROCEDURE — 85027 COMPLETE CBC AUTOMATED: CPT | Performed by: NURSE PRACTITIONER

## 2023-05-16 PROCEDURE — 80061 LIPID PANEL: CPT | Performed by: NURSE PRACTITIONER

## 2023-05-16 PROCEDURE — 80048 BASIC METABOLIC PNL TOTAL CA: CPT | Performed by: NURSE PRACTITIONER

## 2023-05-16 PROCEDURE — 99214 OFFICE O/P EST MOD 30 MIN: CPT | Mod: 25 | Performed by: NURSE PRACTITIONER

## 2023-05-16 PROCEDURE — 82607 VITAMIN B-12: CPT | Performed by: NURSE PRACTITIONER

## 2023-05-16 PROCEDURE — 36415 COLL VENOUS BLD VENIPUNCTURE: CPT | Performed by: NURSE PRACTITIONER

## 2023-05-16 PROCEDURE — G0439 PPPS, SUBSEQ VISIT: HCPCS | Performed by: NURSE PRACTITIONER

## 2023-05-16 PROCEDURE — 84443 ASSAY THYROID STIM HORMONE: CPT | Performed by: NURSE PRACTITIONER

## 2023-05-16 RX ORDER — LISINOPRIL 20 MG/1
20 TABLET ORAL DAILY
Qty: 90 TABLET | Refills: 3 | Status: SHIPPED | OUTPATIENT
Start: 2023-05-16 | End: 2024-04-09

## 2023-05-16 RX ORDER — LEVOTHYROXINE SODIUM 75 UG/1
75 TABLET ORAL DAILY
Qty: 90 TABLET | Refills: 3 | Status: SHIPPED | OUTPATIENT
Start: 2023-05-16 | End: 2024-04-09

## 2023-05-16 ASSESSMENT — PAIN SCALES - GENERAL: PAINLEVEL: NO PAIN (0)

## 2023-05-16 ASSESSMENT — ACTIVITIES OF DAILY LIVING (ADL): CURRENT_FUNCTION: NO ASSISTANCE NEEDED

## 2023-05-16 NOTE — LETTER
May 17, 2023      Catherine Salgado  [address] [place] [address]        Dear ,    We are writing to inform you of your test results.    CBC was within normal limits with normal hemoglobin levels.  Vitamin B12 was within normal limits continue with current vitamin supplements.  Thyroid was within normal limits.     Basic metabolic panel did show a mildly elevated creatinine at 0.99 normal is 0.95 but GFR still remains at 57 continue to monitor and recheck at your next office visit.     Cholesterol numbers remain mildly elevated with a total cholesterol at 210 relates that below 200 continue with current low-cholesterol diet and exercise.     Resulted Orders   CBC with platelets   Result Value Ref Range    WBC Count 6.1 4.0 - 11.0 10e3/uL    RBC Count 4.46 3.80 - 5.20 10e6/uL    Hemoglobin 13.9 11.7 - 15.7 g/dL    Hematocrit 42.2 35.0 - 47.0 %    MCV 95 78 - 100 fL    MCH 31.2 26.5 - 33.0 pg    MCHC 32.9 31.5 - 36.5 g/dL    RDW 14.7 10.0 - 15.0 %    Platelet Count 235 150 - 450 10e3/uL   Basic metabolic panel  (Ca, Cl, CO2, Creat, Gluc, K, Na, BUN)   Result Value Ref Range    Sodium 144 136 - 145 mmol/L    Potassium 4.2 3.4 - 5.3 mmol/L    Chloride 105 98 - 107 mmol/L    Carbon Dioxide (CO2) 30 (H) 22 - 29 mmol/L    Anion Gap 9 7 - 15 mmol/L    Urea Nitrogen 20.8 8.0 - 23.0 mg/dL    Creatinine 0.99 (H) 0.51 - 0.95 mg/dL    Calcium 10.0 8.8 - 10.2 mg/dL    Glucose 100 (H) 70 - 99 mg/dL    GFR Estimate 57 (L) >60 mL/min/1.73m2      Comment:      eGFR calculated using 2021 CKD-EPI equation.   Lipid panel reflex to direct LDL Fasting   Result Value Ref Range    Cholesterol 210 (H) <200 mg/dL    Triglycerides 179 (H) <150 mg/dL    Direct Measure HDL 49 (L) >=50 mg/dL    LDL Cholesterol Calculated 125 (H) <=100 mg/dL    Non HDL Cholesterol 161 (H) <130 mg/dL    Narrative    Cholesterol  Desirable:  <200 mg/dL    Triglycerides  Normal:  Less than 150 mg/dL  Borderline High:  150-199 mg/dL  High:  200-499  mg/dL  Very High:  Greater than or equal to 500 mg/dL    Direct Measure HDL  Female:  Greater than or equal to 50 mg/dL   Male:  Greater than or equal to 40 mg/dL    LDL Cholesterol  Desirable:  <100mg/dL  Above Desirable:  100-129 mg/dL   Borderline High:  130-159 mg/dL   High:  160-189 mg/dL   Very High:  >= 190 mg/dL    Non HDL Cholesterol  Desirable:  130 mg/dL  Above Desirable:  130-159 mg/dL  Borderline High:  160-189 mg/dL  High:  190-219 mg/dL  Very High:  Greater than or equal to 220 mg/dL   TSH with free T4 reflex   Result Value Ref Range    TSH 3.35 0.30 - 4.20 uIU/mL   Vitamin B12   Result Value Ref Range    Vitamin B12 701 232 - 1,245 pg/mL       If you have any questions or concerns, please call the clinic at the number listed above.       Sincerely,      ROSALEE Byrnes CNP

## 2023-05-16 NOTE — PROGRESS NOTES
"SUBJECTIVE:   Catherine is a 83 year old who presents for Preventive Visit.      5/16/2023    12:49 PM   Additional Questions   Roomed by Debi Mccarty   Patient has been advised of split billing requirements and indicates understanding: Yes  Are you in the first 12 months of your Medicare coverage?  No    Healthy Habits:     In general, how would you rate your overall health?  Good    Frequency of exercise:  None    Duration of exercise:  Other    Do you usually eat at least 4 servings of fruit and vegetables a day, include whole grains    & fiber and avoid regularly eating high fat or \"junk\" foods?  Yes    Taking medications regularly:  No    Barriers to taking medications:  None    Medication side effects:  None    Ability to successfully perform activities of daily living:  No assistance needed    Home Safety:  Lack of grab bars in the bathroom and throw rugs in the hallway    Hearing Impairment:  No hearing concerns    In the past 6 months, have you been bothered by leaking of urine? Yes    In general, how would you rate your overall mental or emotional health?  Good      PHQ-2 Total Score: 0    Additional concerns today:  No      Have you ever done Advance Care Planning? (For example, a Health Directive, POLST, or a discussion with a medical provider or your loved ones about your wishes): No, advance care planning information given to patient to review.  Patient declined advance care planning discussion at this time.       Fall risk  Fallen 2 or more times in the past year?: No  Any fall with injury in the past year?: No    Cognitive Screening   1) Repeat 3 items (Leader, Season, Table)    2) Clock draw: NORMAL  3) 3 item recall: Recalls 2 objects   Results: NORMAL clock, 1-2 items recalled: COGNITIVE IMPAIRMENT LESS LIKELY    Mini-CogTM Copyright ROGELIO Temple. Licensed by the author for use in [facility]; reprinted with permission (chiquita@.[facility]). All rights reserved.      Do you have sleep apnea, " excessive snoring or daytime drowsiness?: no    Reviewed and updated as needed this visit by clinical staff                  Reviewed and updated as needed this visit by Provider                 Social History     Tobacco Use     Smoking status: Some Days     Packs/day: 0.50     Years: 50.00     Pack years: 25.00     Types: Cigarettes     Smokeless tobacco: Never   Vaping Use     Vaping status: Never Used   Substance Use Topics     Alcohol use: Not Currently     Comment: rare              View : No data to display.              Do you have a current opioid prescription? No  Do you use any other controlled substances or medications that are not prescribed by a provider? None        Current providers sharing in care for this patient include:   Patient Care Team:  Danielle Steele APRN CNP as PCP - General (Family Medicine)  Juan Guzman MD as Assigned Endocrinology Provider  Danielle Steele APRN CNP as Assigned PCP    The following health maintenance items are reviewed in Epic and correct as of today:  Health Maintenance   Topic Date Due     Pneumococcal Vaccine: 65+ Years (1 - PCV) Never done     ZOSTER IMMUNIZATION (1 of 2) Never done     DTAP/TDAP/TD IMMUNIZATION (1 - Tdap) 12/23/1992     DEXA  10/16/2021     COVID-19 Vaccine (4 - Moderna series) 01/11/2022     MEDICARE ANNUAL WELLNESS VISIT  05/20/2022     PHQ-2 (once per calendar year)  01/01/2023     FALL RISK ASSESSMENT  05/03/2023     NICOTINE/TOBACCO CESSATION COUNSELING Q 1 YR  05/03/2023     ANNUAL REVIEW OF HM ORDERS  08/18/2023     TSH W/FREE T4 REFLEX  02/13/2024     ADVANCE CARE PLANNING  05/20/2026     INFLUENZA VACCINE  Completed     IPV IMMUNIZATION  Aged Out     MENINGITIS IMMUNIZATION  Aged Out     LUNG CANCER SCREENING  Discontinued     Lab work is in process  Labs reviewed in EPIC  BP Readings from Last 3 Encounters:   05/16/23 138/60   08/22/22 (!) 161/94   01/31/22 (!) 144/99    Wt Readings from Last 3 Encounters:   05/16/23  64.9 kg (143 lb)   08/18/22 67.1 kg (148 lb)   01/31/22 68.9 kg (152 lb)                  Patient Active Problem List   Diagnosis     Hypertension goal BP (blood pressure) < 140/90     Macular degeneration (senile) of retina     Hyperlipidemia     Bunion     Generalized osteoarthrosis, unspecified site     HYPERLIPIDEMIA LDL GOAL <130     Advanced directives, counseling/discussion     Hypothyroidism     Cerebral infarction (H)     Health Care Home     Tobacco abuse     Past Surgical History:   Procedure Laterality Date     COLONOSCOPY  5/27/2005    Diverticulosis     SURGICAL HISTORY OF -       T&A     SURGICAL HISTORY OF -   10/1976    Vaginal hysterectomy, A & P repair       Social History     Tobacco Use     Smoking status: Some Days     Packs/day: 0.50     Years: 50.00     Pack years: 25.00     Types: Cigarettes     Smokeless tobacco: Never   Vaping Use     Vaping status: Never Used   Substance Use Topics     Alcohol use: Not Currently     Comment: rare     Family History   Problem Relation Age of Onset     Cerebrovascular Disease Mother         sebral hemmerage     C.A.D. Father      Cancer Father         kidney CA     Cardiovascular Sister         cardiac arrest     Musculoskeletal Disorder Daughter         rotator cuff, knee repair     Diabetes Sister      Musculoskeletal Disorder Daughter         ankle and arm injuries     Musculoskeletal Disorder Daughter         Knees     Hypertension Sister          Current Outpatient Medications   Medication Sig Dispense Refill     Ascorbic Acid (VITAMIN C PO) Take 1,000 mg by mouth daily sometimes       aspirin 81 MG tablet Take 162 mg by mouth every evening        Cholecalciferol (VITAMIN D-3) 5000 UNIT TABS Take 1 tablet by mouth daily.       Coenzyme Q10 (COQ-10) 100 MG CAPS Take 1 capsule by mouth daily        EPINEPHrine (ADRENACLICK) 0.3 MG/0.3ML injection 2-pack Inject 0.3 mLs (0.3 mg) into the muscle as needed for anaphylaxis 0.6 mL 0     levothyroxine  (SYNTHROID/LEVOTHROID) 75 MCG tablet Take 1 tablet (75 mcg) by mouth daily 90 tablet 0     lisinopril (ZESTRIL) 20 MG tablet Take 1 tablet (20 mg) by mouth daily 90 tablet 3     Lutein 20 MG CAPS Take 1 capsule by mouth daily        Probiotic Product (PROBIOTIC PO)        Selenium 200 MCG CAPS Take by mouth daily       vitamin A-Beta Carotene (A-THOMAS-25) 77970 units capsule Take 1 capsule (25,000 Units) by mouth daily       vitamin B complex with vitamin C (STRESS TAB) tablet Take 1 tablet by mouth every evening        VITAMIN E COMPLEX PO Take 267 mg by mouth daily       Allergies   Allergen Reactions     Wasps [Hornets]      Was stung in face by wasp and had significant facial swelling, Aug 2018     Codeine Other (See Comments)     Confusion, was given Tylenol # 3 after hysterectomy and developed mild confusion      Gadavist [Gadobutrol] Other (See Comments)     Did ok if benadryl is given.      Ivp Dye [Contrast Dye] Other (See Comments)     Severe arthritic reaction     Pcn [Penicillins] Hives     Recent Labs   Lab Test 02/13/23  1532 12/27/22  1011 08/18/22  1347 05/03/22  1334 01/06/20  1138 05/20/19  1311 02/01/19  1019 07/01/17  0920 02/06/17  1135 04/14/16  0837 12/30/15  1808 12/22/15  0854 11/01/15  0815   A1C  --   --   --   --   --   --   --   --   --   --   --   --  6.0   LDL  --   --   --  183*  --   --   --   --   --  131*  --  51 145*   HDL  --   --   --  63  --   --   --   --   --  56  --  63 51   TRIG  --   --   --  126  --   --   --   --   --  137  --  81 124   ALT  --   --   --   --   --  21  --  20  --   --   --   --  19   CR  --   --   --  0.83  --  0.76  --  0.80  --   --    < >  --  0.73   GFRESTIMATED  --   --   --  71  --  76  --  70  --   --    < >  --  78   GFRESTBLACK  --   --   --   --   --  88  --  85  --   --    < >  --  >90   GFR Calc     POTASSIUM  --   --   --  4.1  --  4.1  --  4.0  --   --    < >  --  3.7   TSH 5.02* 0.09*   < > 3.41   < >  --    < >  --    < >  "2.57  --   --  2.49    < > = values in this interval not displayed.          Mammogram Screening: Mammogram Screening - Patient over age 75, has elected to discontinue screenings.  History of abnormal Pap smear: NO - age 65 - see link Cervical Cytology Screening Guidelines  Last 3 Pap and HPV Results:       9/13/2006    12:00 AM   PAP / HPV   PAP (Historical) NIL           Pertinent mammograms are reviewed under the imaging tab.    Review of Systems  Constitutional, HEENT, cardiovascular, pulmonary, gi and gu systems are negative, except as otherwise noted.    OBJECTIVE:   LMP  (LMP Unknown)  Estimated body mass index is 26.22 kg/m  as calculated from the following:    Height as of 8/18/22: 1.6 m (5' 3\").    Weight as of 8/18/22: 67.1 kg (148 lb). /60 (BP Location: Right arm, Cuff Size: Adult Regular)   Pulse 60   Temp 97.2  F (36.2  C) (Tympanic)   Resp 20   Ht 1.6 m (5' 2.99\")   Wt 64.9 kg (143 lb)   LMP  (LMP Unknown)   SpO2 96%   BMI 25.34 kg/m      Physical Exam  GENERAL APPEARANCE: healthy, alert and no distress  EYES: Eyes grossly normal to inspection, PERRL and conjunctivae and sclerae normal  HENT: ear canals and TM's normal, nose and mouth without ulcers or lesions, oropharynx clear and oral mucous membranes moist  NECK: no adenopathy, no asymmetry, masses, or scars and thyroid normal to palpation  RESP: lungs clear to auscultation - no rales, rhonchi or wheezes  CV: regular rate and rhythm, normal S1 S2, no S3 or S4, no murmur, click or rub, no peripheral edema and peripheral pulses strong  ABDOMEN: soft, nontender, no hepatosplenomegaly, no masses and bowel sounds normal  MS: no musculoskeletal defects are noted and gait is age appropriate without ataxia  SKIN: no suspicious lesions or rashes  NEURO: Normal strength and tone, sensory exam grossly normal, mentation intact and speech normal  PSYCH: mentation appears normal and affect normal/bright    Diagnostic Test Results:  Labs reviewed in " "Epic  No results found for any visits on 05/16/23.    ASSESSMENT / PLAN:     Problem List Items Addressed This Visit     Hypothyroidism    Relevant Medications    levothyroxine (SYNTHROID/LEVOTHROID) 75 MCG tablet    Other Relevant Orders    TSH with free T4 reflex (Completed)   Other Visit Diagnoses     Nail deformity    -  Primary    Relevant Orders    Vitamin B12 (Completed)    Benign essential hypertension        Relevant Medications    lisinopril (ZESTRIL) 20 MG tablet    Other Relevant Orders    Lipid panel reflex to direct LDL Fasting (Completed)    Basic metabolic panel  (Ca, Cl, CO2, Creat, Gluc, K, Na, BUN) (Completed)    CBC with platelets (Completed)    Encounter for Medicare annual wellness exam        Relevant Orders    PRIMARY CARE FOLLOW-UP SCHEDULING          Patient has been advised of split billing requirements and indicates understanding: Yes      COUNSELING:  Reviewed preventive health counseling, as reflected in patient instructions       Regular exercise       Healthy diet/nutrition       Vision screening       Hearing screening       Dental care       Bladder control       Fall risk prevention       Folic Acid       Osteoporosis prevention/bone health       Colon cancer screening       ([place])menopause management      BMI:   Estimated body mass index is 26.22 kg/m  as calculated from the following:    Height as of 8/18/22: 1.6 m (5' 3\").    Weight as of 8/18/22: 67.1 kg (148 lb).         She reports that she has been smoking cigarettes. She has a 25.00 pack-year smoking history. She has never used smokeless tobacco.  Nicotine/Tobacco Cessation Plan:   Information offered: Patient not interested at this time      Appropriate preventive services were discussed with this patient, including applicable screening as appropriate for cardiovascular disease, diabetes, osteopenia/osteoporosis, and glaucoma.  As appropriate for age/gender, discussed screening for colorectal cancer, prostate cancer, " breast cancer, and cervical cancer. Checklist reviewing preventive services available has been given to the patient.    Reviewed patients plan of care and provided an AVS. The Basic Care Plan (routine screening as documented in Health Maintenance) for Catherine meets the Care Plan requirement. This Care Plan has been established and reviewed with the Patient.          ROSALEE Byrnes [facility]    Identified Health Risks:    I have reviewed Opioid Use Disorder and Substance Use Disorder risk factors and made any needed referrals.       She is at risk for lack of exercise and has been provided with information to increase physical activity for the benefit of her well-being.  Information on urinary incontinence and treatment options given to patient.

## 2023-05-17 LAB — VIT B12 SERPL-MCNC: 701 PG/ML (ref 232–1245)

## 2023-05-22 NOTE — PATIENT INSTRUCTIONS
Patient Education   Personalized Prevention Plan  You are due for the preventive services outlined below.  Your care team is available to assist you in scheduling these services.  If you have already completed any of these items, please share that information with your care team to update in your medical record.  Health Maintenance Due   Topic Date Due     Pneumococcal Vaccine (1 - PCV) Never done     Zoster (Shingles) Vaccine (1 of 2) Never done     Diptheria Tetanus Pertussis (DTAP/TDAP/TD) Vaccine (1 - Tdap) 12/23/1992     Osteoporosis Screening  10/16/2021     COVID-19 Vaccine (4 - Moderna series) 01/11/2022     Annual Wellness Visit  05/20/2022       Exercise for a Healthier Heart  You may wonder how you can improve the health of your heart. If you re thinking about exercise, you re on the right track. You don t need to become an athlete. But you do need a certain amount of brisk exercise to help strengthen your heart. If you have been diagnosed with a heart condition, your healthcare provider may advise exercise to help your condition. To help make exercise a habit, choose safe, fun activities.      Exercise with a friend. When activity is fun, you're more likely to stick with it.     Before you start  Check with your healthcare provider before starting an exercise program. This is especially important if you haven't been active for a while. It's also important if you have a long-term (chronic) health problem such as heart disease, diabetes, or obesity. Also check with your provider if you're at high risk for having these problems.   Why exercise?  Exercising regularly offers many healthy rewards. It can help you do all of these:     Improve your blood cholesterol level to help prevent further heart trouble.    Lower your blood pressure to help prevent a stroke or heart attack.    Control diabetes or reduce your risk of getting this disease.    Improve your heart and lung function.    Reach and stay at a  healthy weight.    Make your muscles stronger so you can stay active.    Prevent falls and fractures by slowing the loss of bone mass (osteoporosis).    Manage stress better.    Improve your sense of self and your body image.  Exercise tips      Ease into your routine. Set small goals. Then build on them. Talk with your healthcare provider first before starting an exercise routine if you're not sure what your activity level should be.    Exercise on most days. Aim for a total of at least 150 minutes (2 hours and 30 minutes) or more of moderate-intensity aerobic activity each week. You could also do 75 minutes (1 hour and 15 minutes) or more of vigorous-intensity aerobic activity each week. Or try for a combination of both. Moderate activity means that you breathe heavier and your heart rate increases, but you can still talk. Think about doing at least 30 minutes of moderate exercise, 5 times a week. It's OK to work up to the 30-minute period over time. Examples of moderate-intensity activity are brisk walking, gardening, and water aerobics.    Step up your daily activity level.  Along with your exercise program, try being more active the whole day. Walk instead of drive. Or park further away so that you take more steps each day. Do more household tasks or yard work. You may not be able to meet the advised amount of physical activity. But doing some moderate- or vigorous-intensity aerobic activity can help reduce your risk for heart disease. Your healthcare provider can help you figure out what is best for you.    Choose 1 or more activities you enjoy.  Walking is one of the easiest things you can do. You can also try swimming, riding a bike, dancing, or taking an exercise class.    Call 911  Call 911 right away if any of these occur:     Chest pain that doesn't go away quickly with rest    New burning, tightness, pressure, or heaviness in your chest, neck, shoulders, back, or arms    Abnormal or severe shortness of  breath    A very fast or irregular heartbeat (palpitations)    Fainting  When to call your healthcare provider  Call your healthcare provider if you have any of these:     Dizziness or lightheadedness    Mild shortness of breath or chest pain    Increased or new joint or muscle pain    [employer] last reviewed this educational content on 7/1/2022 2000-2022 [employer]. All rights reserved. This information is not intended as a substitute for professional medical care. Always follow your healthcare professional's instructions.          Urinary Incontinence, Female (Adult)   Urinary incontinence means loss of bladder control. This problem affects many women, especially as they get older. If you have incontinence, you may be embarrassed to ask for help. But know that this problem can be treated.   Types of Incontinence  There are different types of incontinence. Two of the main types are described here. You can have more than one type.     Stress incontinence. With this type, urine leaks when pressure (stress) is put on the bladder. This may happen when you cough, sneeze, or laugh. Stress incontinence most often occurs because the pelvic floor muscles that support the bladder and urethra are weak. This can happen after pregnancy and vaginal childbirth or a hysterectomy. It can also be due to excess body weight or hormone changes.    Urge incontinence (also called overactive bladder). With this type, a sudden urge to urinate is felt often. This may happen even though there may not be much urine in the bladder. The need to urinate often during the night is common. Urge incontinence most often occurs because of bladder spasms. This may be due to bladder irritation or infection. Damage to bladder nerves or pelvic muscles, constipation, and certain medicines can also lead to urge incontinence.  Treatment depends on the cause. Further evaluation is needed to find the type you have. This will likely include an  exam and certain tests. Based on the results, you and your healthcare provider can then plan treatment. Until a diagnosis is made, the home care tips below can help ease symptoms.   Home care    Do pelvic floor muscle exercises, if they are prescribed. The pelvic floor muscles help support the bladder and urethra. Many women find that their symptoms improve when doing special exercises that strengthen these muscles. To do the exercises, contract the muscles you would use to stop your stream of urine. But do this when you re not urinating. Hold for 10 seconds, then relax. Repeat 10 to 20 times in a row, at least 3 times a day. Your healthcare provider may give you other instructions for how to do the exercises and how often.    Keep a bladder diary. This helps track how often and how much you urinate over a set period of time. Bring this diary with you to your next visit with the provider. The information can help your provider learn more about your bladder problem.    Lose weight, if advised to by your provider. Extra weight puts pressure on the bladder. Your provider can help you create a weight-loss plan that s right for you. This may include exercising more and making certain diet changes.    Don't have foods and drinks that may irritate the bladder. These can include alcohol and caffeinated drinks.    Quit smoking. Smoking and other tobacco use can lead to a long-term (chronic) cough that strains the pelvic floor muscles. Smoking may also damage the bladder and urethra. Talk with your provider about treatments or methods you can use to quit smoking.    If drinking large amounts of fluid makes you have symptoms, you may be advised to limit your fluid intake. You may also be advised to drink most of your fluids during the day and to limit fluids at night.    If you re worried about urine leakage or accidents, you may wear absorbent pads to catch urine. Change the pads often. This helps reduce discomfort. It may  also reduce the risk of skin or bladder infections.    Follow-up care  Follow up with your healthcare provider, or as directed. It may take some to find the right treatment for your problem. But healthy lifestyle changes can be made right away. These include such things as exercising on a regular basis, eating a healthy diet, losing weight (if needed), and quitting smoking. Your treatment plan may include special therapies or medicines. Certain procedures or surgery may also be options. Talk about any questions you have with your provider.   When to seek medical advice  Call the healthcare provider right away if any of these occur:    Fever of 100.4 F (38 C) or higher, or as directed by your provider    Bladder pain or fullness    Belly swelling    Nausea or vomiting    Back pain    Weakness, dizziness, or fainting  [employer] last reviewed this educational content on 1/1/2020 2000-2022 [employer]. All rights reserved. This information is not intended as a substitute for professional medical care. Always follow your healthcare professional's instructions.

## 2024-04-09 ENCOUNTER — OFFICE VISIT (OUTPATIENT)
Dept: FAMILY MEDICINE | Facility: CLINIC | Age: 84
End: 2024-04-09
Payer: MEDICARE

## 2024-04-09 VITALS
DIASTOLIC BLOOD PRESSURE: 70 MMHG | HEART RATE: 54 BPM | SYSTOLIC BLOOD PRESSURE: 122 MMHG | HEIGHT: 63 IN | BODY MASS INDEX: 23.57 KG/M2 | RESPIRATION RATE: 16 BRPM | WEIGHT: 133 LBS | TEMPERATURE: 97.4 F | OXYGEN SATURATION: 97 %

## 2024-04-09 DIAGNOSIS — M81.0 AGE-RELATED OSTEOPOROSIS WITHOUT CURRENT PATHOLOGICAL FRACTURE: ICD-10-CM

## 2024-04-09 DIAGNOSIS — E55.9 VITAMIN D DEFICIENCY: ICD-10-CM

## 2024-04-09 DIAGNOSIS — N18.31 STAGE 3A CHRONIC KIDNEY DISEASE (H): ICD-10-CM

## 2024-04-09 DIAGNOSIS — I10 BENIGN ESSENTIAL HYPERTENSION: ICD-10-CM

## 2024-04-09 DIAGNOSIS — N18.31 CHRONIC KIDNEY DISEASE, STAGE 3A (H): ICD-10-CM

## 2024-04-09 DIAGNOSIS — E03.9 HYPOTHYROIDISM, UNSPECIFIED TYPE: Primary | ICD-10-CM

## 2024-04-09 DIAGNOSIS — N95.1 MENOPAUSAL SYNDROME (HOT FLASHES): ICD-10-CM

## 2024-04-09 LAB
ALBUMIN SERPL BCG-MCNC: 4.2 G/DL (ref 3.5–5.2)
ALP SERPL-CCNC: 62 U/L (ref 40–150)
ALT SERPL W P-5'-P-CCNC: 18 U/L (ref 0–50)
ANION GAP SERPL CALCULATED.3IONS-SCNC: 12 MMOL/L (ref 7–15)
AST SERPL W P-5'-P-CCNC: 37 U/L (ref 0–45)
BILIRUB SERPL-MCNC: 0.4 MG/DL
BUN SERPL-MCNC: 31.9 MG/DL (ref 8–23)
CALCIUM SERPL-MCNC: 10 MG/DL (ref 8.8–10.2)
CHLORIDE SERPL-SCNC: 103 MMOL/L (ref 98–107)
CREAT SERPL-MCNC: 1.2 MG/DL (ref 0.51–0.95)
DEPRECATED HCO3 PLAS-SCNC: 26 MMOL/L (ref 22–29)
EGFRCR SERPLBLD CKD-EPI 2021: 45 ML/MIN/1.73M2
ERYTHROCYTE [DISTWIDTH] IN BLOOD BY AUTOMATED COUNT: 14.8 % (ref 10–15)
GLUCOSE SERPL-MCNC: 75 MG/DL (ref 70–99)
HCT VFR BLD AUTO: 43.7 % (ref 35–47)
HGB BLD-MCNC: 14.3 G/DL (ref 11.7–15.7)
MCH RBC QN AUTO: 31.8 PG (ref 26.5–33)
MCHC RBC AUTO-ENTMCNC: 32.7 G/DL (ref 31.5–36.5)
MCV RBC AUTO: 97 FL (ref 78–100)
PLATELET # BLD AUTO: 246 10E3/UL (ref 150–450)
POTASSIUM SERPL-SCNC: 4.6 MMOL/L (ref 3.4–5.3)
PROT SERPL-MCNC: 7.8 G/DL (ref 6.4–8.3)
RBC # BLD AUTO: 4.5 10E6/UL (ref 3.8–5.2)
SODIUM SERPL-SCNC: 141 MMOL/L (ref 135–145)
TSH SERPL DL<=0.005 MIU/L-ACNC: 1.96 UIU/ML (ref 0.3–4.2)
VIT B12 SERPL-MCNC: 917 PG/ML (ref 232–1245)
VIT D+METAB SERPL-MCNC: 62 NG/ML (ref 20–50)
WBC # BLD AUTO: 7.3 10E3/UL (ref 4–11)

## 2024-04-09 PROCEDURE — 84443 ASSAY THYROID STIM HORMONE: CPT | Performed by: NURSE PRACTITIONER

## 2024-04-09 PROCEDURE — 85027 COMPLETE CBC AUTOMATED: CPT | Performed by: NURSE PRACTITIONER

## 2024-04-09 PROCEDURE — 82607 VITAMIN B-12: CPT | Performed by: NURSE PRACTITIONER

## 2024-04-09 PROCEDURE — 80053 COMPREHEN METABOLIC PANEL: CPT | Performed by: NURSE PRACTITIONER

## 2024-04-09 PROCEDURE — G2211 COMPLEX E/M VISIT ADD ON: HCPCS | Performed by: NURSE PRACTITIONER

## 2024-04-09 PROCEDURE — 99214 OFFICE O/P EST MOD 30 MIN: CPT | Performed by: NURSE PRACTITIONER

## 2024-04-09 PROCEDURE — 82306 VITAMIN D 25 HYDROXY: CPT | Performed by: NURSE PRACTITIONER

## 2024-04-09 PROCEDURE — 36415 COLL VENOUS BLD VENIPUNCTURE: CPT | Performed by: NURSE PRACTITIONER

## 2024-04-09 RX ORDER — LEVOTHYROXINE SODIUM 75 UG/1
75 TABLET ORAL DAILY
Qty: 90 TABLET | Refills: 3 | Status: SHIPPED | OUTPATIENT
Start: 2024-04-09 | End: 2024-05-22

## 2024-04-09 RX ORDER — RESPIRATORY SYNCYTIAL VIRUS VACCINE 120MCG/0.5
0.5 KIT INTRAMUSCULAR ONCE
Qty: 1 EACH | Refills: 0 | Status: CANCELLED | OUTPATIENT
Start: 2024-04-09 | End: 2024-04-09

## 2024-04-09 RX ORDER — LISINOPRIL 20 MG/1
20 TABLET ORAL DAILY
Qty: 90 TABLET | Refills: 3 | Status: SHIPPED | OUTPATIENT
Start: 2024-04-09

## 2024-04-09 ASSESSMENT — PAIN SCALES - GENERAL: PAINLEVEL: NO PAIN (0)

## 2024-04-09 NOTE — LETTER
April 16, 2024      Catherine Salgado  [address] [place] [address]        Dear ,    We are writing to inform you of your test results.     CBC was within normal limits.  Thyroid was within normal limits.  Comprehensive panel did show increasing creatinine levels 1.2 with decreasing GFR we will continue to monitor and recommend a follow-up lab only appointment in 6 weeks for recheck.  Your vitamin D was mildly elevated would recommend that we switch to every other day of the vitamin D supplements and recheck vitamin D levels in 6 weeks when we recheck your creatinine level lab only appointment.     Resulted Orders   TSH with free T4 reflex   Result Value Ref Range    TSH 1.96 0.30 - 4.20 uIU/mL   Comprehensive metabolic panel (BMP + Alb, Alk Phos, ALT, AST, Total. Bili, TP)   Result Value Ref Range    Sodium 141 135 - 145 mmol/L      Comment:      Reference intervals for this test were updated on 09/26/2023 to more accurately reflect our healthy population. There may be differences in the flagging of prior results with similar values performed with this method. Interpretation of those prior results can be made in the context of the updated reference intervals.     Potassium 4.6 3.4 - 5.3 mmol/L    Carbon Dioxide (CO2) 26 22 - 29 mmol/L    Anion Gap 12 7 - 15 mmol/L    Urea Nitrogen 31.9 (H) 8.0 - 23.0 mg/dL    Creatinine 1.20 (H) 0.51 - 0.95 mg/dL    GFR Estimate 45 (L) >60 mL/min/1.73m2    Calcium 10.0 8.8 - 10.2 mg/dL    Chloride 103 98 - 107 mmol/L    Glucose 75 70 - 99 mg/dL    Alkaline Phosphatase 62 40 - 150 U/L      Comment:      Reference intervals for this test were updated on 11/14/2023 to more accurately reflect our healthy population. There may be differences in the flagging of prior results with similar values performed with this method. Interpretation of those prior results can be made in the context of the updated reference intervals.    AST 37 0 - 45 U/L      Comment:       Reference intervals for this test were updated on 6/12/2023 to more accurately reflect our healthy population. There may be differences in the flagging of prior results with similar values performed with this method. Interpretation of those prior results can be made in the context of the updated reference intervals.    ALT 18 0 - 50 U/L      Comment:      Reference intervals for this test were updated on 6/12/2023 to more accurately reflect our healthy population. There may be differences in the flagging of prior results with similar values performed with this method. Interpretation of those prior results can be made in the context of the updated reference intervals.      Protein Total 7.8 6.4 - 8.3 g/dL    Albumin 4.2 3.5 - 5.2 g/dL    Bilirubin Total 0.4 <=1.2 mg/dL   Vitamin B12   Result Value Ref Range    Vitamin B12 917 232 - 1,245 pg/mL   Vitamin D Deficiency   Result Value Ref Range    Vitamin D, Total (25-Hydroxy) 62 (H) 20 - 50 ng/mL      Comment:      indicates supplementation, with increased risk of hypercalciuria    Narrative    Season, race, dietary intake, and treatment affect the concentration of 25-hydroxy-Vitamin D. Values may decrease during winter months and increase during summer months.    Vitamin D determination is routinely performed by an immunoassay specific for 25 hydroxyvitamin D3.  If an individual is on vitamin D2(ergocalciferol) supplementation, please specify 25 OH vitamin D2 and D3 level determination by LCMSMS test [id number].     CBC with platelets   Result Value Ref Range    WBC Count 7.3 4.0 - 11.0 10e3/uL    RBC Count 4.50 3.80 - 5.20 10e6/uL    Hemoglobin 14.3 11.7 - 15.7 g/dL    Hematocrit 43.7 35.0 - 47.0 %    MCV 97 78 - 100 fL    MCH 31.8 26.5 - 33.0 pg    MCHC 32.7 31.5 - 36.5 g/dL    RDW 14.8 10.0 - 15.0 %    Platelet Count 246 150 - 450 10e3/uL       If you have any questions or concerns, please call the clinic at the number listed above.       Sincerely,      Danielle Singh  ROSALEE Steele CNP

## 2024-04-09 NOTE — PROGRESS NOTES
Assessment & Plan     Hypothyroidism, unspecified type  Stable   - REVIEW OF HEALTH MAINTENANCE PROTOCOL ORDERS  - levothyroxine (SYNTHROID/LEVOTHROID) 75 MCG tablet; Take 1 tablet (75 mcg) by mouth daily  - TSH with free T4 reflex; Future  - TSH with free T4 reflex    Benign essential hypertension   Controlled no change in treatment plan   - lisinopril (ZESTRIL) 20 MG tablet; Take 1 tablet (20 mg) by mouth daily  - Comprehensive metabolic panel (BMP + Alb, Alk Phos, ALT, AST, Total. Bili, TP); Future  - CBC with platelets; Future  - Comprehensive metabolic panel (BMP + Alb, Alk Phos, ALT, AST, Total. Bili, TP)  - CBC with platelets    Vitamin D deficiency  stable  - Vitamin B12; Future  - Vitamin D Deficiency; Future  - Vitamin B12  - Vitamin D Deficiency  - Vitamin D Deficiency; Future    Menopausal syndrome (hot flashes)  - DEXA HIP/PELVIS/SPINE - Future; Future    Age-related osteoporosis without current pathological fracture  - DEXA HIP/PELVIS/SPINE - Future; Future    Stage 3a chronic kidney disease (H)  - Basic metabolic panel  (Ca, Cl, CO2, Creat, Gluc, K, Na, BUN); Future    Chronic kidney disease, stage 3a (H)      The longitudinal plan of care for the diagnosis(es)/condition(s) as documented were addressed during this visit. Due to the added complexity in care, I will continue to support Catherine in the subsequent management and with ongoing continuity of care.     Nicotine/Tobacco Cessation  She reports that she has been smoking cigarettes. She has a 25 pack-year smoking history. She has never used smokeless tobacco.  Nicotine/Tobacco Cessation Plan  Information offered: Patient not interested at this time        See Patient Instructions    Subjective   Catherine is a 84 year old, presenting for the following health issues:  No chief complaint on file.      4/9/2024     9:16 AM   Additional Questions   Roomed by Debi KABA     Hyperlipidemia Follow-Up    Are you regularly taking any medication or  "supplement to lower your cholesterol?   No  Are you having muscle aches or other side effects that you think could be caused by your cholesterol lowering medication?  No    Hypertension Follow-up    Do you check your blood pressure regularly outside of the clinic? No   Are you following a low salt diet? No  Are your blood pressures ever more than 140 on the top number (systolic) OR more   than 90 on the bottom number (diastolic), for example 140/90? No    Hypothyroidism Follow-up    Since last visit, patient describes the following symptoms: Weight stable, no hair loss, no skin changes, no constipation, no loose stools        Review of Systems  Constitutional, HEENT, cardiovascular, pulmonary, gi and gu systems are negative, except as otherwise noted.      Objective    LMP  (LMP Unknown)   Body mass index is 23.57 kg/m . Vital signs:  Temp: 97.4  F (36.3  C) Temp src: Tympanic BP: 122/70 Pulse: 54   Resp: 16 SpO2: 97 %     Height: 160 cm (5' 2.99\") Weight: 60.3 kg (133 lb)  Estimated body mass index is 23.57 kg/m  as calculated from the following:    Height as of this encounter: 1.6 m (5' 2.99\").    Weight as of this encounter: 60.3 kg (133 lb).        Physical Exam   GENERAL: alert and no distress  EYES: Eyes grossly normal to inspection, PERRL and conjunctivae and sclerae normal  HENT: ear canals and TM's normal, nose and mouth without ulcers or lesions  NECK: no adenopathy, no asymmetry, masses, or scars  RESP: lungs clear to auscultation - no rales, rhonchi or wheezes  CV: regular rate and rhythm, normal S1 S2, no S3 or S4, no murmur, click or rub, no peripheral edema  MS: no gross musculoskeletal defects noted, no edema  SKIN: no suspicious lesions or rashes  NEURO: Normal strength and tone, mentation intact and speech normal  PSYCH: mentation appears normal, affect normal/bright    Results for orders placed or performed in visit on 04/09/24   TSH with free T4 reflex     Status: Normal   Result Value Ref Range "    TSH 1.96 0.30 - 4.20 uIU/mL   Comprehensive metabolic panel (BMP + Alb, Alk Phos, ALT, AST, Total. Bili, TP)     Status: Abnormal   Result Value Ref Range    Sodium 141 135 - 145 mmol/L    Potassium 4.6 3.4 - 5.3 mmol/L    Carbon Dioxide (CO2) 26 22 - 29 mmol/L    Anion Gap 12 7 - 15 mmol/L    Urea Nitrogen 31.9 (H) 8.0 - 23.0 mg/dL    Creatinine 1.20 (H) 0.51 - 0.95 mg/dL    GFR Estimate 45 (L) >60 mL/min/1.73m2    Calcium 10.0 8.8 - 10.2 mg/dL    Chloride 103 98 - 107 mmol/L    Glucose 75 70 - 99 mg/dL    Alkaline Phosphatase 62 40 - 150 U/L    AST 37 0 - 45 U/L    ALT 18 0 - 50 U/L    Protein Total 7.8 6.4 - 8.3 g/dL    Albumin 4.2 3.5 - 5.2 g/dL    Bilirubin Total 0.4 <=1.2 mg/dL   Vitamin B12     Status: Normal   Result Value Ref Range    Vitamin B12 917 232 - 1,245 pg/mL   Vitamin D Deficiency     Status: Abnormal   Result Value Ref Range    Vitamin D, Total (25-Hydroxy) 62 (H) 20 - 50 ng/mL    Narrative    Season, race, dietary intake, and treatment affect the concentration of 25-hydroxy-Vitamin D. Values may decrease during winter months and increase during summer months.    Vitamin D determination is routinely performed by an immunoassay specific for 25 hydroxyvitamin D3.  If an individual is on vitamin D2(ergocalciferol) supplementation, please specify 25 OH vitamin D2 and D3 level determination by LCMSMS test [id number].     CBC with platelets     Status: Normal   Result Value Ref Range    WBC Count 7.3 4.0 - 11.0 10e3/uL    RBC Count 4.50 3.80 - 5.20 10e6/uL    Hemoglobin 14.3 11.7 - 15.7 g/dL    Hematocrit 43.7 35.0 - 47.0 %    MCV 97 78 - 100 fL    MCH 31.8 26.5 - 33.0 pg    MCHC 32.7 31.5 - 36.5 g/dL    RDW 14.8 10.0 - 15.0 %    Platelet Count 246 150 - 450 10e3/uL           Signed Electronically by: ROASLEE Byrnes CNP

## 2024-04-12 PROBLEM — N18.31 CHRONIC KIDNEY DISEASE, STAGE 3A (H): Status: ACTIVE | Noted: 2024-04-12

## 2024-05-15 DIAGNOSIS — T63.441A BEE STING REACTION, ACCIDENTAL OR UNINTENTIONAL, INITIAL ENCOUNTER: ICD-10-CM

## 2024-05-16 RX ORDER — EPINEPHRINE 0.3 MG/.3ML
0.3 INJECTION SUBCUTANEOUS PRN
Qty: 2 EACH | Refills: 0 | Status: SHIPPED | OUTPATIENT
Start: 2024-05-16

## 2024-05-21 ENCOUNTER — LAB (OUTPATIENT)
Dept: LAB | Facility: CLINIC | Age: 84
End: 2024-05-21
Payer: MEDICARE

## 2024-05-21 ENCOUNTER — DOCUMENTATION ONLY (OUTPATIENT)
Dept: FAMILY MEDICINE | Facility: CLINIC | Age: 84
End: 2024-05-21

## 2024-05-21 DIAGNOSIS — N18.31 STAGE 3A CHRONIC KIDNEY DISEASE (H): ICD-10-CM

## 2024-05-21 DIAGNOSIS — E03.9 HYPOTHYROIDISM, UNSPECIFIED TYPE: Primary | ICD-10-CM

## 2024-05-21 DIAGNOSIS — E03.9 HYPOTHYROIDISM, UNSPECIFIED TYPE: ICD-10-CM

## 2024-05-21 DIAGNOSIS — E55.9 VITAMIN D DEFICIENCY: ICD-10-CM

## 2024-05-21 LAB
ANION GAP SERPL CALCULATED.3IONS-SCNC: 12 MMOL/L (ref 7–15)
BUN SERPL-MCNC: 25.2 MG/DL (ref 8–23)
CALCIUM SERPL-MCNC: 9.8 MG/DL (ref 8.8–10.2)
CHLORIDE SERPL-SCNC: 104 MMOL/L (ref 98–107)
CREAT SERPL-MCNC: 1.1 MG/DL (ref 0.51–0.95)
DEPRECATED HCO3 PLAS-SCNC: 25 MMOL/L (ref 22–29)
EGFRCR SERPLBLD CKD-EPI 2021: 50 ML/MIN/1.73M2
GLUCOSE SERPL-MCNC: 112 MG/DL (ref 70–99)
POTASSIUM SERPL-SCNC: 4.8 MMOL/L (ref 3.4–5.3)
SODIUM SERPL-SCNC: 141 MMOL/L (ref 135–145)
T4 FREE SERPL-MCNC: 1.21 NG/DL (ref 0.9–1.7)
TSH SERPL DL<=0.005 MIU/L-ACNC: 6.6 UIU/ML (ref 0.3–4.2)
VIT D+METAB SERPL-MCNC: 50 NG/ML (ref 20–50)

## 2024-05-21 PROCEDURE — 84439 ASSAY OF FREE THYROXINE: CPT

## 2024-05-21 PROCEDURE — 84443 ASSAY THYROID STIM HORMONE: CPT

## 2024-05-21 PROCEDURE — 80048 BASIC METABOLIC PNL TOTAL CA: CPT

## 2024-05-21 PROCEDURE — 36415 COLL VENOUS BLD VENIPUNCTURE: CPT

## 2024-05-21 PROCEDURE — 82306 VITAMIN D 25 HYDROXY: CPT

## 2024-05-21 NOTE — PROGRESS NOTES
Good morning,    This patient came in for her labs this morning. She was wondering if it was possible to add a TSH. The specimen is obtained if you feel it is necessary to be added.     Thanks,   Cintia

## 2024-05-22 DIAGNOSIS — E03.9 HYPOTHYROIDISM, UNSPECIFIED TYPE: Primary | ICD-10-CM

## 2024-05-22 RX ORDER — LEVOTHYROXINE SODIUM 100 UG/1
100 TABLET ORAL DAILY
Qty: 90 TABLET | Refills: 0 | Status: SHIPPED | OUTPATIENT
Start: 2024-05-22 | End: 2024-08-07

## 2024-05-28 ENCOUNTER — TELEPHONE (OUTPATIENT)
Dept: FAMILY MEDICINE | Facility: CLINIC | Age: 84
End: 2024-05-28
Payer: MEDICARE

## 2024-05-28 NOTE — TELEPHONE ENCOUNTER
Lab result note should be taking the 100 mcg     Basic metabolic panel shows creatinine at 1.1 which is her baseline we will continue to monitor.  Thyroid was elevated at 6.6 I recommend we increase the levothyroxine to 100 mcg and recheck lab only appointment in 6 to 8 weeks.  Vitamin D was within normal limits.     Danielle Steele CNP

## 2024-05-28 NOTE — TELEPHONE ENCOUNTER
Patient called to see what dose of levothyroxine she should be taking as she took both the 75  mcg and 100 mcg dose today.     TSH   Date Value Ref Range Status   05/21/2024 6.60 (H) 0.30 - 4.20 uIU/mL Final   08/18/2022 7.69 (H) 0.40 - 4.00 mU/L Final   05/28/2021 3.17 0.40 - 4.00 mU/L Final     Reviewed lab results and detailed message on 5/22/24 from Danielle Steele NP and patient verbalized good understanding. Patient will take the levothyroxine 100 mcg 1 tab po daily. Warm hand off given to scheduling dept to coordinate lab only appointment in NB 6-8 wks.     Update sent to Danielle Steele NP as MARIMAR.    Julie Behrendt RN

## 2024-07-03 ENCOUNTER — LAB (OUTPATIENT)
Dept: LAB | Facility: CLINIC | Age: 84
End: 2024-07-03
Payer: MEDICARE

## 2024-07-03 DIAGNOSIS — E03.9 HYPOTHYROIDISM, UNSPECIFIED TYPE: ICD-10-CM

## 2024-07-03 LAB — TSH SERPL DL<=0.005 MIU/L-ACNC: 0.53 UIU/ML (ref 0.3–4.2)

## 2024-07-03 PROCEDURE — 36415 COLL VENOUS BLD VENIPUNCTURE: CPT

## 2024-07-03 PROCEDURE — 84443 ASSAY THYROID STIM HORMONE: CPT

## 2024-07-13 ENCOUNTER — HOSPITAL ENCOUNTER (EMERGENCY)
Facility: CLINIC | Age: 84
Discharge: HOME | End: 2024-07-13
Attending: FAMILY MEDICINE | Admitting: FAMILY MEDICINE
Payer: MEDICARE

## 2024-07-13 VITALS
HEIGHT: 64 IN | BODY MASS INDEX: 20.83 KG/M2 | TEMPERATURE: 97.8 F | DIASTOLIC BLOOD PRESSURE: 74 MMHG | SYSTOLIC BLOOD PRESSURE: 125 MMHG | WEIGHT: 122 LBS | RESPIRATION RATE: 19 BRPM | HEART RATE: 56 BPM | OXYGEN SATURATION: 97 %

## 2024-07-13 DIAGNOSIS — R61 DIAPHORESIS: ICD-10-CM

## 2024-07-13 DIAGNOSIS — R55 VASOVAGAL NEAR SYNCOPE: ICD-10-CM

## 2024-07-13 LAB
ANION GAP SERPL CALCULATED.3IONS-SCNC: 10 MMOL/L (ref 7–15)
BASOPHILS # BLD AUTO: 0.1 10E3/UL (ref 0–0.2)
BASOPHILS NFR BLD AUTO: 1 %
BUN SERPL-MCNC: 28.2 MG/DL (ref 8–23)
CALCIUM SERPL-MCNC: 9.4 MG/DL (ref 8.8–10.2)
CHLORIDE SERPL-SCNC: 106 MMOL/L (ref 98–107)
CREAT SERPL-MCNC: 1.3 MG/DL (ref 0.51–0.95)
DEPRECATED HCO3 PLAS-SCNC: 24 MMOL/L (ref 22–29)
EGFRCR SERPLBLD CKD-EPI 2021: 41 ML/MIN/1.73M2
EOSINOPHIL # BLD AUTO: 0.3 10E3/UL (ref 0–0.7)
EOSINOPHIL NFR BLD AUTO: 3 %
ERYTHROCYTE [DISTWIDTH] IN BLOOD BY AUTOMATED COUNT: 14.2 % (ref 10–15)
GLUCOSE SERPL-MCNC: 124 MG/DL (ref 70–99)
HCT VFR BLD AUTO: 39.9 % (ref 35–47)
HGB BLD-MCNC: 13.3 G/DL (ref 11.7–15.7)
HOLD SPECIMEN: NORMAL
IMM GRANULOCYTES # BLD: 0 10E3/UL
IMM GRANULOCYTES NFR BLD: 1 %
LYMPHOCYTES # BLD AUTO: 2.6 10E3/UL (ref 0.8–5.3)
LYMPHOCYTES NFR BLD AUTO: 31 %
MCH RBC QN AUTO: 32.1 PG (ref 26.5–33)
MCHC RBC AUTO-ENTMCNC: 33.3 G/DL (ref 31.5–36.5)
MCV RBC AUTO: 96 FL (ref 78–100)
MONOCYTES # BLD AUTO: 0.6 10E3/UL (ref 0–1.3)
MONOCYTES NFR BLD AUTO: 8 %
NEUTROPHILS # BLD AUTO: 4.7 10E3/UL (ref 1.6–8.3)
NEUTROPHILS NFR BLD AUTO: 57 %
NRBC # BLD AUTO: 0 10E3/UL
NRBC BLD AUTO-RTO: 0 /100
PLATELET # BLD AUTO: 245 10E3/UL (ref 150–450)
POTASSIUM SERPL-SCNC: 4.5 MMOL/L (ref 3.4–5.3)
RBC # BLD AUTO: 4.14 10E6/UL (ref 3.8–5.2)
SODIUM SERPL-SCNC: 140 MMOL/L (ref 135–145)
TROPONIN T SERPL HS-MCNC: 12 NG/L
TROPONIN T SERPL HS-MCNC: 13 NG/L
TSH SERPL DL<=0.005 MIU/L-ACNC: 1.1 UIU/ML (ref 0.3–4.2)
WBC # BLD AUTO: 8.2 10E3/UL (ref 4–11)

## 2024-07-13 PROCEDURE — 93010 ELECTROCARDIOGRAM REPORT: CPT | Performed by: FAMILY MEDICINE

## 2024-07-13 PROCEDURE — 99284 EMERGENCY DEPT VISIT MOD MDM: CPT | Performed by: FAMILY MEDICINE

## 2024-07-13 PROCEDURE — 36415 COLL VENOUS BLD VENIPUNCTURE: CPT | Performed by: FAMILY MEDICINE

## 2024-07-13 PROCEDURE — 258N000003 HC RX IP 258 OP 636: Performed by: FAMILY MEDICINE

## 2024-07-13 PROCEDURE — 84443 ASSAY THYROID STIM HORMONE: CPT | Performed by: FAMILY MEDICINE

## 2024-07-13 PROCEDURE — 85025 COMPLETE CBC W/AUTO DIFF WBC: CPT | Performed by: FAMILY MEDICINE

## 2024-07-13 PROCEDURE — 93005 ELECTROCARDIOGRAM TRACING: CPT | Performed by: FAMILY MEDICINE

## 2024-07-13 PROCEDURE — 93308 TTE F-UP OR LMTD: CPT | Mod: 26 | Performed by: FAMILY MEDICINE

## 2024-07-13 PROCEDURE — 96360 HYDRATION IV INFUSION INIT: CPT | Mod: 59 | Performed by: FAMILY MEDICINE

## 2024-07-13 PROCEDURE — 99285 EMERGENCY DEPT VISIT HI MDM: CPT | Mod: 25 | Performed by: FAMILY MEDICINE

## 2024-07-13 PROCEDURE — 80048 BASIC METABOLIC PNL TOTAL CA: CPT | Performed by: FAMILY MEDICINE

## 2024-07-13 PROCEDURE — 93308 TTE F-UP OR LMTD: CPT | Performed by: FAMILY MEDICINE

## 2024-07-13 PROCEDURE — 84484 ASSAY OF TROPONIN QUANT: CPT | Performed by: FAMILY MEDICINE

## 2024-07-13 RX ADMIN — SODIUM CHLORIDE 500 ML: 9 INJECTION, SOLUTION INTRAVENOUS at 20:01

## 2024-07-13 ASSESSMENT — COLUMBIA-SUICIDE SEVERITY RATING SCALE - C-SSRS
2. HAVE YOU ACTUALLY HAD ANY THOUGHTS OF KILLING YOURSELF IN THE PAST MONTH?: NO
6. HAVE YOU EVER DONE ANYTHING, STARTED TO DO ANYTHING, OR PREPARED TO DO ANYTHING TO END YOUR LIFE?: NO
1. IN THE PAST MONTH, HAVE YOU WISHED YOU WERE DEAD OR WISHED YOU COULD GO TO SLEEP AND NOT WAKE UP?: NO

## 2024-07-13 ASSESSMENT — ACTIVITIES OF DAILY LIVING (ADL)
ADLS_ACUITY_SCORE: 35

## 2024-07-13 NOTE — ED TRIAGE NOTES
"   Pt was out to eat, standing in line and suddenly became very weak and sweaty and felt like she was going to pass out.  Pt sat down.   Staff put a cold rag aound her neck and on her head and felt better.  Pt then drank ice water and then pt became nauseated.  Pt states she has not vomitied.   had staff call 911.   EMS 20g IV in L arm, 500ml bolus of NS infused, 4mg zofran given.   Pt nauseated on arrival, dry heaved x 1.  Nausea comes and goes.   Pt reports no pain.   P reports feeling fine all day yesterday and today.  Pt was not outside today, did nothing unusual, sat in house and relaxed.  Pt with PMH of \"tiny stroke\".  Neuro exam;   WNL.       Triage Assessment (Adult)       Row Name 07/13/24 2440          Triage Assessment    Airway WDL WDL        Respiratory WDL    Respiratory WDL WDL        Skin Circulation/Temperature WDL    Skin Circulation/Temperature WDL WDL        Cardiac WDL    Cardiac WDL WDL        Peripheral/Neurovascular WDL    Peripheral Neurovascular WDL WDL        Cognitive/Neuro/Behavioral WDL    Cognitive/Neuro/Behavioral WDL WDL                     "

## 2024-07-13 NOTE — ED PROVIDER NOTES
"  HPI   Patient is an 84-year-old female presenting by EMS transport with syncope.  Per my chart review, the patient has a known history of CVA.  She has hypertension and hyperlipidemia, taking aspirin 81 mg daily and lisinopril.  She has hypothyroidism, taking Synthroid.  She has CKD is stage IIIa.  She is a smoker, occasionally.  She has rare alcohol use.  No recent clinic visits.  Clinic visit in April showing blood pressure 122/70 and pulse 54.  She tells me her thyroid medication increased to 100 mcg within the past month.    The patient was standing in line at a restaurant today, waiting to sit down, when she became suddenly flushed and sweaty and then lightheaded.  She sat down and her  described her as \"staring off and monitor self for about 8 or 9 minutes.  She did not lose consciousness.  The patient was retching.  There was no evidence of pain.  The patient denies having palpitations or chest pain leading into the event.  She denies palpitations or chest pain after the event.  She has some loss of time, thinking that the event was much faster than it was.  No obvious seizure activity.  No biting of the mouth or the tongue.  No tics were seen.  No loss of bowel or bladder.  She reports intermittent episodes of sweatiness over the past week but not before that.  There were no associated episodes of lightheadedness or nausea with these flushing episodes.  No recent chest pain.  No leg pain or swelling.  She denies recent urinary symptoms.  No diarrhea.  No hematochezia or melena.  No recent cough or congestion.  No fever.  No trauma or injury.  She has been eating and drinking normally.  No obvious weight loss.      Allergies:  Allergies   Allergen Reactions    Wasps [Hornets]      Was stung in face by wasp and had significant facial swelling, Aug 2018    Bees     Codeine Other (See Comments)     Confusion, was given Tylenol # 3 after hysterectomy and developed mild confusion     Gadavist [Gadobutrol] " Other (See Comments)     Did ok if benadryl is given.     Ivp Dye [Contrast Dye] Other (See Comments)     Severe arthritic reaction    Pcn [Penicillins] Hives     Problem List:    Patient Active Problem List    Diagnosis Date Noted    Chronic kidney disease, stage 3a (H) 04/12/2024     Priority: Medium    Tobacco abuse 05/20/2021     Priority: Medium    Cerebral infarction (H) 10/31/2015     Priority: Medium     Diagnosis updated by automated process. Provider to review and confirm.      Hypothyroidism 08/14/2013     Priority: Medium    HYPERLIPIDEMIA LDL GOAL <130 10/31/2010     Priority: Medium    Hypertension goal BP (blood pressure) < 140/90      Priority: Medium     dx date - ?  CV risk factors previous smoker, family history and lipids  FH positive for diabetes mellitus and cardiovascular disease  history of renal disease negative      Macular degeneration (senile) of retina      Priority: Medium     Problem list name updated by automated process. Provider to review      Hyperlipidemia      Priority: Medium     CHOL      282   03/11/2005  LDL      177   03/11/2005  HDL       35   03/11/2005  Problem list name updated by automated process. Provider to review      Bunion      Priority: Medium     Bilateral      Generalized osteoarthrosis, unspecified site      Priority: Medium     Cervical        Past Medical History:    Past Medical History:   Diagnosis Date    Bunion     Chest pain, unspecified     Generalized osteoarthrosis, unspecified site     Macular degeneration (senile) of retina, unspecified     Other and unspecified hyperlipidemia     Phlebitis and thrombophlebitis of other deep vessels of lower extremities     TOBACCO ABUSE-CONTINUOUS     Unspecified essential hypertension      Past Surgical History:    Past Surgical History:   Procedure Laterality Date    COLONOSCOPY  5/27/2005    Diverticulosis    SURGICAL HISTORY OF -       T&A    SURGICAL HISTORY OF -   10/1976    Vaginal hysterectomy, A & P  "repair     Family History:    Family History   Problem Relation Age of Onset    Cerebrovascular Disease Mother         bianca hemmerage    C.A.D. Father     Cancer Father         kidney CA    Cardiovascular Sister         cardiac arrest    Musculoskeletal Disorder Daughter         rotator cuff, knee repair    Diabetes Sister     Musculoskeletal Disorder Daughter         ankle and arm injuries    Musculoskeletal Disorder Daughter         Knees    Hypertension Sister      Social History:  Marital Status:   [2]  Social History     Tobacco Use    Smoking status: Some Days     Current packs/day: 0.50     Average packs/day: 0.5 packs/day for 50.0 years (25.0 ttl pk-yrs)     Types: Cigarettes    Smokeless tobacco: Never   Vaping Use    Vaping status: Never Used   Substance Use Topics    Alcohol use: Not Currently     Comment: rare    Drug use: No      Medications:    Ascorbic Acid (VITAMIN C PO)  aspirin 81 MG tablet  Cholecalciferol (VITAMIN D-3) 5000 UNIT TABS  Coenzyme Q10 (COQ-10) 100 MG CAPS  EPINEPHrine (ANY BX GENERIC EQUIV) 0.3 MG/0.3ML injection 2-pack  levothyroxine (SYNTHROID/LEVOTHROID) 100 MCG tablet  lisinopril (ZESTRIL) 20 MG tablet  Lutein 20 MG CAPS  Probiotic Product (PROBIOTIC PO)  Selenium 200 MCG CAPS  vitamin A-Beta Carotene (A-THOMAS-25) 35471 units capsule  vitamin B complex with vitamin C (STRESS TAB) tablet  VITAMIN E COMPLEX PO      Review of Systems   All other systems reviewed and are negative.      PE   BP: (!) 142/73  Pulse: 61  Temp: 97.8  F (36.6  C)  Resp: 16  Height: 162.6 cm (5' 4\")  Weight: 55.3 kg (122 lb)  SpO2: 94 %  Physical Exam  Vitals reviewed.   Constitutional:       General: She is not in acute distress.     Appearance: She is well-developed.   HENT:      Head: Normocephalic and atraumatic.      Right Ear: External ear normal.      Left Ear: External ear normal.      Nose: Nose normal.      Mouth/Throat:      Mouth: Mucous membranes are moist.      Pharynx: Oropharynx is " clear.   Eyes:      Extraocular Movements: Extraocular movements intact.      Conjunctiva/sclera: Conjunctivae normal.      Pupils: Pupils are equal, round, and reactive to light.   Cardiovascular:      Rate and Rhythm: Normal rate and regular rhythm.      Heart sounds: Normal heart sounds.   Pulmonary:      Effort: Pulmonary effort is normal.      Breath sounds: Normal breath sounds.   Abdominal:      Palpations: Abdomen is soft.      Tenderness: There is no abdominal tenderness.   Musculoskeletal:         General: Normal range of motion.      Cervical back: Normal range of motion.   Skin:     General: Skin is warm and dry.   Neurological:      Mental Status: She is alert and oriented to person, place, and time.   Psychiatric:         Behavior: Behavior normal.         ED COURSE and MDM   1903.  Patient with a prolonged episode of lightheadedness during which she was not as responsive as normal and during which she does not remember the details.  She does remember feeling lightheaded beforehand.  She remembers feeling sweaty before and after.  She had nausea with retching though she does not remember this.  Her EKG shows sinus bradycardia but is otherwise unremarkable.  She has been experiencing episodic diaphoresis over the past week.  Lab is pending.  Normal saline fluid bolus.    2003.  Ultrasound showing collapsing vena cava, consistent with some decreased volume/dehydration.  The patient was asking about this earlier, questioning whether or not dehydration might contribute to her symptoms.  Dehydration may certainly contribute but I doubt her symptoms are all related to being mildly dry.  Repeat the 500 mL bolus (initially given by EMS).  Creatinine slightly worse than previous though similar.  Initial troponin negative, repeat pending at 8:30 PM.  Remaining ultrasound unremarkable for obvious/significant cardiac pathology.    2130.  Repeat troponin negative, low concern for myocardial infarction.  No  dysrhythmias here.  She explained her episode again and it is certainly consistent with a vasovagal syncope problem.  Source?  Heat related?  However, she is also having these diaphoretic episodes that have occurred multiple times over the past week.  These are not occurring with lightheadedness.  These are not occurring with palpitations or chest pain or shortness of breath either.  Zio patch ordered.  Primary care follow-up ordered.  Unlike seizure activity, low concern for this.  No headache and no concern for stroke.  Low concern for infectious related etiology.    EKG  (1904)   Interpretation performed by me.  Rate: 56     Rhythm: sinus     Axis: nl  Intervals: SD (12-2) 178, QRS (<12) 86, QTc (>5) 457  P wave: down in V1     QRS complex: poor R wave progression   ST segment / T-wave: nl  Conclusion: Possible left atrial enlargement, poor R wave progression.  Last EKG was 2017 and is almost identical.    Electronic medical chart reviewed, including medical problems, medications, medical allergies, social history.  Recent hospitalizations and surgical procedures reviewed.  Recent clinic visits and consultations reviewed.  Recent labs and test results reviewed.  Nursing notes reviewed.    The patient, their parent if applicable, and/or their medical decision maker(s) and I have reviewed all of the available historical information, applicable PMH, physical exam findings, and objective diagnostic data gathered during this ED visit.  We then discussed all work-up options and then together agreed upon the course taken during this visit.  The ultimate disposition and plan was a cooperative decision made between myself and the patient, their parent if applicable, and/or their legal decision maker(s).  The risks and benefits of all decisions made during this visit were discussed to the best of my abilities given the circumstances, and all parties are understanding of the pertinent ramifications of these decisions.       LABS  Labs Ordered and Resulted from Time of ED Arrival to Time of ED Departure   BASIC METABOLIC PANEL - Abnormal       Result Value    Sodium 140      Potassium 4.5      Chloride 106      Carbon Dioxide (CO2) 24      Anion Gap 10      Urea Nitrogen 28.2 (*)     Creatinine 1.30 (*)     GFR Estimate 41 (*)     Calcium 9.4      Glucose 124 (*)    TROPONIN T, HIGH SENSITIVITY - Normal    Troponin T, High Sensitivity 13     TSH WITH FREE T4 REFLEX - Normal    TSH 1.10     TROPONIN T, HIGH SENSITIVITY - Normal    Troponin T, High Sensitivity 12     CBC WITH PLATELETS AND DIFFERENTIAL    WBC Count 8.2      RBC Count 4.14      Hemoglobin 13.3      Hematocrit 39.9      MCV 96      MCH 32.1      MCHC 33.3      RDW 14.2      Platelet Count 245      % Neutrophils 57      % Lymphocytes 31      % Monocytes 8      % Eosinophils 3      % Basophils 1      % Immature Granulocytes 1      NRBCs per 100 WBC 0      Absolute Neutrophils 4.7      Absolute Lymphocytes 2.6      Absolute Monocytes 0.6      Absolute Eosinophils 0.3      Absolute Basophils 0.1      Absolute Immature Granulocytes 0.0      Absolute NRBCs 0.0         IMAGING  Images reviewed by me.  Radiology report also reviewed.  POC US ECHO LIMITED   Preliminary Result   [facility] Procedure Note        Limited Bedside ED Cardiac Ultrasound:      PROCEDURE: PERFORMED BY: Dr. Maxwell Chandler MD   INDICATIONS/SYMPTOM:  syncope   PROBE: Cardiac phased array probe   BODY LOCATION: Chest   FINDINGS:    The ultrasound was performed utilizing the subcostal, parasternal long axis, parasternal short axis, and apical 4 chamber views.   Cardiac contractility:  Present   Gross estimation of cardiac kinesis: normal   Pericardial Effusion:  None   RV:LV ratio: LV > RV   IVC:     Diameter:  IVC diameter expiration (IVCe) 2 cm                                                    IVC diameter inspiration (IVCi) 0-1 cm                                                         Collapsibility:  IVC collapses > 50% with inspiration   INTERPRETATION:    Chamber size and motion were grossly normal with LV > RV, normal cardiac kinesis.  No pericardial effusion was found.  IVC visualized and findings indicate hypovolemia.   IMAGE DOCUMENTATION: Images were archived to hard drive.                  Procedures    Medications   sodium chloride 0.9% BOLUS 500 mL (0 mLs Intravenous Stopped 7/13/24 2130)         IMPRESSION       ICD-10-CM    1. Vasovagal near syncope  R55 ZIO PATCH MAIL OUT     Primary Care Referral      2. Diaphoresis  R61 ZIO PATCH MAIL OUT     Primary Care Referral               Medication List      There are no discharge medications for this visit.                             Maxwell Chandler MD  07/13/24 0364

## 2024-07-14 ENCOUNTER — ORDERS ONLY (AUTO-RELEASED) (OUTPATIENT)
Dept: EMERGENCY MEDICINE | Facility: CLINIC | Age: 84
End: 2024-07-14
Payer: MEDICARE

## 2024-07-14 DIAGNOSIS — R55 VASOVAGAL NEAR SYNCOPE: ICD-10-CM

## 2024-07-14 DIAGNOSIS — R61 DIAPHORESIS: ICD-10-CM

## 2024-07-14 NOTE — DISCHARGE INSTRUCTIONS
RETURN TO THE EMERGENCY ROOM FOR THE FOLLOWING:    Recurring episodes of fainting, racing heart or palpitations that is unrelenting over the course of hours, new trouble with breathing, new chest pain, or at anytime for any concern.    FOLLOW UP:    Zio patch ( cardiac monitor) order placed.  Expect a mailing with instructions.  Wear the patch for a week and then mail back as directed.  You should get follow-up with your primary clinic thereafter.  I will place a referral order for primary care follow up at the time of discharge, expect a phone call within the next few days to help with scheduling.    TREATMENT RECOMMENDATIONS:    Increase oral fluids daily.    NURSE ADVICE LINE:  [phone] or [phone]

## 2024-07-24 ENCOUNTER — TELEPHONE (OUTPATIENT)
Dept: FAMILY MEDICINE | Facility: CLINIC | Age: 84
End: 2024-07-24
Payer: COMMERCIAL

## 2024-07-24 NOTE — TELEPHONE ENCOUNTER
Reason for Call:  Appointment Request    Patient requesting this type of appt:  discuss health concerns    Requested provider:  Natalie Zayas    Reason patient unable to be scheduled: Not within requested timeframe    When does patient want to be seen/preferred time:  anytime on 7/30 or 8/7    Comments: Madelin Salgado has been seen by Natalie Zayas and would her mom to be seen by her because she is so good and found things no other Drs have    Okay to leave a detailed message?: Yes at Other phone number:  daughter Madelin [phone]     Call taken on 7/24/2024 at 6:23 PM by Galina Chua

## 2024-08-05 PROCEDURE — 93244 EXT ECG>48HR<7D REV&INTERPJ: CPT | Performed by: INTERNAL MEDICINE

## 2024-08-07 ENCOUNTER — OFFICE VISIT (OUTPATIENT)
Dept: FAMILY MEDICINE | Facility: CLINIC | Age: 84
End: 2024-08-07
Payer: MEDICARE

## 2024-08-07 VITALS
OXYGEN SATURATION: 98 % | RESPIRATION RATE: 20 BRPM | HEART RATE: 54 BPM | BODY MASS INDEX: 22.02 KG/M2 | TEMPERATURE: 98.4 F | SYSTOLIC BLOOD PRESSURE: 148 MMHG | HEIGHT: 64 IN | DIASTOLIC BLOOD PRESSURE: 76 MMHG | WEIGHT: 129 LBS

## 2024-08-07 DIAGNOSIS — I47.10 SVT (SUPRAVENTRICULAR TACHYCARDIA) (H): ICD-10-CM

## 2024-08-07 DIAGNOSIS — N18.31 CHRONIC KIDNEY DISEASE, STAGE 3A (H): ICD-10-CM

## 2024-08-07 DIAGNOSIS — E03.9 HYPOTHYROIDISM, UNSPECIFIED TYPE: ICD-10-CM

## 2024-08-07 DIAGNOSIS — R73.09 ELEVATED GLUCOSE LEVEL: ICD-10-CM

## 2024-08-07 DIAGNOSIS — Z86.73 HISTORY OF CVA (CEREBROVASCULAR ACCIDENT): ICD-10-CM

## 2024-08-07 DIAGNOSIS — R55 NEAR SYNCOPE: Primary | ICD-10-CM

## 2024-08-07 PROCEDURE — 99214 OFFICE O/P EST MOD 30 MIN: CPT | Performed by: NURSE PRACTITIONER

## 2024-08-07 RX ORDER — ASPIRIN 81 MG/1
81 TABLET ORAL DAILY
COMMUNITY
Start: 2024-08-07

## 2024-08-07 ASSESSMENT — PAIN SCALES - GENERAL: PAINLEVEL: NO PAIN (0)

## 2024-08-07 NOTE — PATIENT INSTRUCTIONS
Stop taking your Levothyroxine for the next 6 weeks and then schedule a lab only visit between 6-8 weeks for recheck.    Schedule to see me within a week for follow up     Stop taking the medications below     Change taking Aspirin to 81 mg/day

## 2024-08-07 NOTE — PROGRESS NOTES
Assessment & Plan     Near syncope  SVT (supraventricular tachycardia) (H24)  Patient had a near syncopal episode accompanied with diaphoresis and weakness approximately 3 weeks ago, evaluated in the emergency department with relatively normal workup including cardiac workup.  Was sent home with a cardiac monitor, this is discussed further below.  Patient has since decreased her levothyroxine to 75 mcg and symptoms have significantly improved if not resolved.  Patient does report symptoms did start around the time she increased her dose to 100 mcg and may.  Her symptoms could likely be related, addressed as below.  Patient is also on several supplements, discussed discontinuing some of these as well per after visit summary.  Patient denies any symptoms today.  Also discussed her low heart rate which may also be contributing to symptoms.  Her 6-day cardiac monitor showed frequent PACs at 11% burden and 16 SVTs with the lowest heart rate of 41.  Patient denies any chest pain, palpitations or abnormal heart sensations.  Discussed SVTs and treatment.  Patient is not having any symptoms and with a low heart rate, would be cautious to start a beta-blocker.  If symptoms recur despite plan as below, will likely refer to cardiology for further evaluation and treatment options.    Hypothyroidism, unspecified type  Several year history of hypothyroidism, started treatment in 2013 with an elevated TSH and normal T4.  Patient's dosing has been relatively the same until recently increased to 100 mcg in May due to a slightly elevated TSH and normal T4.  Patient denied any symptoms at that time.  Patient started having symptoms as above.  Since patient has decreased down to 75 mcg and symptoms have resolved.  Patient wondering if she needs treatment at all.  Discussed we can certainly trial a short period of time off of medication and see if her TSH normalizes.  Patient and family are in agreements with this as she is feeling  much better with the lower dose.  Will have her stop for the next 6 to 8 weeks and schedule lab only appointment for recheck around that time and follow-up with this provider in clinic shortly thereafter for reevaluation.  - TSH with free T4 reflex; Future    Chronic kidney disease, stage 3a (H)  Chronic, slightly worse while in ER, could have been secondary to dehydration.  Patient has been staying well-hydrated.  Will get recheck metabolic panel with next lab check.  - Basic metabolic panel  (Ca, Cl, CO2, Creat, Gluc, K, Na, BUN); Future    History of CVA (cerebrovascular accident)  History of CVA, patient was taking 3 - 81 mg tablets.  Did advise patient okay to go down to 181 mg tablet a day.  - aspirin 81 MG EC tablet; Take 1 tablet (81 mg) by mouth daily    Elevated glucose level  Previously elevated glucose levels, will also check A1c to rule out diabetes causing any symptoms.  - Hemoglobin A1c; Future             See Patient Instructions    Chelsey Alexander is a 84 year old, presenting for the following health issues:  Thyroid Problem (Discuss thyroid, has been having sweating, feeling of all over fatigued/ weak at once and needs to lay down. ), Results (Go over heart monitor results. ), and Labs Only (Would like discuss blood work and vitamins. )        8/7/2024     2:03 PM   Additional Questions   Roomed by Brandy CLEANING   Accompanied by Daughter Julián and  Narayan          8/7/2024     2:03 PM   Patient Reported Additional Medications   Patient reports taking the following new medications Magnesium, free form lysene 500mg, vit B12 500mcg, zinc 30mg, and turmeric     HPI     ED/UC Followup:    Facility:  [facility] Emergency Dept  Date of visit: 07/13/24  Reason for visit: Vasovagal near syncope & Diaphoresis  Current Status: Has not had any weakness episodes since cutting back on thyroid meds. Wondering if does have an actual thyroid problem, so would like to discuss thyroid meds.  "Wondering if the meds could be causing side effects. Discuss BP meds as well. Pt reports no episodes since.    6-day cardiac monitor.  Frequent PACs (~11%) and 16 SVT runs with longest being 20 beats.  Lowest heart rate was 41 bpm (sinus bradycardia at 9 AM).  No symptoms reported.      Symptoms started approximately around the time she increased her dose to 100 mcg of levothyroxine.  Decreased dose on her own to 75 mcg and symptoms seemed to improve ~ for the last couple of weeks   Has always been up and down with her thyroid, has had negative TPO antibodies.  Started treatment in 2013.  Denies any symptoms of hypo or hyperthyroidism  A lot if not all of her symptoms have improved since decreasing her dose.  Denies any palpitations or abnormal heart rate ~         Review of Systems  Constitutional, HEENT, cardiovascular, pulmonary, gi and gu systems are negative, except as otherwise noted.      Objective    BP (!) 148/76   Pulse 54   Temp 98.4  F (36.9  C) (Tympanic)   Resp 20   Ht 1.626 m (5' 4\")   Wt 58.5 kg (129 lb)   LMP  (LMP Unknown)   SpO2 98%   BMI 22.14 kg/m    Body mass index is 22.14 kg/m .  Physical Exam   GENERAL: alert and no distress  EYES: Eyes grossly normal to inspection, PERRL and conjunctivae and sclerae normal  HENT: ear canals and TM's normal, nose and mouth without ulcers or lesions  NECK: no adenopathy, no asymmetry, masses, or scars  RESP: lungs clear to auscultation - no rales, rhonchi or wheezes  CV: regular rate and rhythm, normal S1 S2, no S3 or S4, no murmur, click or rub, no peripheral edema  ABDOMEN: soft, nontender, no hepatosplenomegaly, no masses and bowel sounds normal  MS: no gross musculoskeletal defects noted, no edema  SKIN: no suspicious lesions or rashes  NEURO: Normal strength and tone, sensory exam grossly normal, mentation intact, and cranial nerves 2-12 intact  PSYCH: mentation appears normal, affect normal/bright    Diagnostic Test Results:  Labs reviewed in " Epic  Pending future          Signed Electronically by: Carleen Walton DNP,, ROSALEE CNP      Chart documentation with Dragon Voice recognition Software. Although reviewed after completion, some words and grammatical errors may remain.

## 2024-08-08 ENCOUNTER — TELEPHONE (OUTPATIENT)
Dept: FAMILY MEDICINE | Facility: CLINIC | Age: 84
End: 2024-08-08
Payer: MEDICARE

## 2024-08-08 DIAGNOSIS — R00.1 BRADYCARDIA: Primary | ICD-10-CM

## 2024-08-08 DIAGNOSIS — R23.2 FLUSHING: ICD-10-CM

## 2024-08-08 DIAGNOSIS — R55 NEAR SYNCOPE: ICD-10-CM

## 2024-08-08 DIAGNOSIS — I47.10 SVT (SUPRAVENTRICULAR TACHYCARDIA) (H): ICD-10-CM

## 2024-08-08 NOTE — TELEPHONE ENCOUNTER
Dtr Madelin calling to get TSH checked and all other hormone levels checked. Mother called Dtr stating she had another episode last night where she could not stop sweating profusely and is thinking this all hormonal. Pt seen PCP yesterday 8/7. Pt is having a really hard time wants the labs done ASAP.     Julián said she has consent to communicate all paperwork was filled out yesterday.     .Natalie Walsh PSC

## 2024-08-08 NOTE — TELEPHONE ENCOUNTER
Daughter Madelin called a 2nd time (C2C on file) wanting to see lab orders had been approved yet. Please see telephone encounter from earlier today.     Message routed again to Natalie Durant DNP.    Julie Behrendt RN

## 2024-08-09 ENCOUNTER — LAB (OUTPATIENT)
Dept: LAB | Facility: CLINIC | Age: 84
End: 2024-08-09
Payer: MEDICARE

## 2024-08-09 DIAGNOSIS — R73.09 ELEVATED GLUCOSE LEVEL: ICD-10-CM

## 2024-08-09 DIAGNOSIS — E78.5 HYPERLIPIDEMIA LDL GOAL <130: ICD-10-CM

## 2024-08-09 DIAGNOSIS — N18.31 CHRONIC KIDNEY DISEASE, STAGE 3A (H): Primary | ICD-10-CM

## 2024-08-09 LAB
ANION GAP SERPL CALCULATED.3IONS-SCNC: 9 MMOL/L (ref 7–15)
BUN SERPL-MCNC: 33.2 MG/DL (ref 8–23)
CALCIUM SERPL-MCNC: 10.2 MG/DL (ref 8.8–10.4)
CHLORIDE SERPL-SCNC: 106 MMOL/L (ref 98–107)
CHOLEST SERPL-MCNC: 225 MG/DL
CREAT SERPL-MCNC: 1.21 MG/DL (ref 0.51–0.95)
CREAT UR-MCNC: 95.6 MG/DL
EGFRCR SERPLBLD CKD-EPI 2021: 45 ML/MIN/1.73M2
FASTING STATUS PATIENT QL REPORTED: NO
FASTING STATUS PATIENT QL REPORTED: NO
GLUCOSE SERPL-MCNC: 146 MG/DL (ref 70–99)
HBA1C MFR BLD: 5.8 % (ref 0–5.6)
HCO3 SERPL-SCNC: 28 MMOL/L (ref 22–29)
HDLC SERPL-MCNC: 57 MG/DL
LDLC SERPL CALC-MCNC: 140 MG/DL
MICROALBUMIN UR-MCNC: 215.6 MG/L
MICROALBUMIN/CREAT UR: 225.52 MG/G CR (ref 0–25)
NONHDLC SERPL-MCNC: 168 MG/DL
POTASSIUM SERPL-SCNC: 4.7 MMOL/L (ref 3.4–5.3)
SODIUM SERPL-SCNC: 143 MMOL/L (ref 135–145)
TRIGL SERPL-MCNC: 138 MG/DL

## 2024-08-09 PROCEDURE — 80048 BASIC METABOLIC PNL TOTAL CA: CPT

## 2024-08-09 PROCEDURE — 36415 COLL VENOUS BLD VENIPUNCTURE: CPT

## 2024-08-09 PROCEDURE — 80061 LIPID PANEL: CPT

## 2024-08-09 PROCEDURE — 83036 HEMOGLOBIN GLYCOSYLATED A1C: CPT

## 2024-08-09 PROCEDURE — 82570 ASSAY OF URINE CREATININE: CPT

## 2024-08-09 PROCEDURE — 82043 UR ALBUMIN QUANTITATIVE: CPT

## 2024-08-09 NOTE — TELEPHONE ENCOUNTER
Called and relayed the below message. Daughter comprehends understanding.    Bailee Kahler on 8/9/2024 at 9:29 AM

## 2024-08-09 NOTE — TELEPHONE ENCOUNTER
No further labs recommended. Would however have her schedule an echocardiogram (ultrasound of the heart) and see Cardiology, both of which we discussed as possible next steps.   She can call [phone] to schedule Echo and should get a call to schedule with Cardiology. I put it in as a priority referral.    Thanks,  Natalie Durant, DNP, APRN-CNP

## 2024-08-09 NOTE — TELEPHONE ENCOUNTER
There are lab orders already in place per our visit if they want to get them drawn sooner.  Her TSH was just completed a few weeks ago.  Have him schedule lab only appointment.    Thanks,  Natalie Durant, DNP, APRN-CNP

## 2024-08-09 NOTE — TELEPHONE ENCOUNTER
"Called daughter, Madelin, and relayed the below message from Natalie.  Madelin wants to know if there are various hormone levels that can be drawn, like \"menopause type hormones\".  Wondering if she can still be going through menopause, or if it is a reaction from her blood pressure pills? Catherine is getting scared because she keeps having these episodes where she starts to sweat profusely.  Madelin also states she(Catherine) has been having some memory and confusion issues.  Like-she thought she needed to stop taking her BP pills, but her last visit summary clearly stated she needed to stop thyroid medications.      Bailee Kahler on 8/9/2024 at 8:40 AM    "

## 2024-08-12 ENCOUNTER — TELEPHONE (OUTPATIENT)
Dept: FAMILY MEDICINE | Facility: CLINIC | Age: 84
End: 2024-08-12
Payer: MEDICARE

## 2024-08-12 DIAGNOSIS — N18.31 CHRONIC KIDNEY DISEASE, STAGE 3A (H): Primary | ICD-10-CM

## 2024-08-12 NOTE — TELEPHONE ENCOUNTER
Natalie Zayas/ Ram[facility] nurse care team:    Daughter Madelin called the clinic, she is concerned about why no one had talked to her about the stage 3 chronic kidney disease, Madelin would like you to help her understand when patient developed this and did you want to put in an order for referral for nephrology? Did review the result note on 4-16-24 per Danielle Steele and that care team read the info to patient and patient verbalized understanding regarding the increasing creatinine levels.  Madelin says patient never talked to her about it. Referral is cued, please advise.      XAVIER Hinojosa

## 2024-08-12 NOTE — TELEPHONE ENCOUNTER
General Call      Reason for Call:  Patients daughter calling regarding recent lab results and chronic kidney disease. She states they were not aware of any type of kidney disease and are looking for additional information regarding this. Should they be doing something to help this? Please advise.       [phone] BROOKLYNN Nesbitt LM

## 2024-08-12 NOTE — TELEPHONE ENCOUNTER
"Called back to discuss She states that neither her or patient were aware of her CKD diagnosis. She states she is busy and unable to talk right now and requests to call back later.    Sydnie Celeste RN on 8/12/2024 at 11:32 AM    OV: 4/9/24   \"Stage 3a chronic kidney disease (H)  - Basic metabolic panel  (Ca, Cl, CO2, Creat, Gluc, K, Na, BUN); Future     Chronic kidney disease, stage 3a (H)\"    OV 8/7/24\"Chronic kidney disease, stage 3a (H)  Chronic, slightly worse while in ER, could have been secondary to dehydration.  Patient has been staying well-hydrated.  Will get recheck metabolic panel with next lab check.  - Basic metabolic panel  (Ca, Cl, CO2, Creat, Gluc, K, Na, BUN); Future\"    "

## 2024-08-16 NOTE — TELEPHONE ENCOUNTER
Nephrology referral placed.  Please update patient should be receiving a phone call to schedule.  Per communication, appears patient was aware of this and did not update daughter, looks like kidney functions have been decreased since last year sometime..    Natalie Durant, DNP, APRN-CNP

## 2024-08-16 NOTE — TELEPHONE ENCOUNTER
Called patient's daughter Lorraine and relayed Natalie Zayas's message. Phone number given to schedule nephrology appt. Lorraine states she and the family are trying to get more involved and help understand patient's health conditions. Lorraine says she plans to have Catherine see cardiology first for the scheduled echo and follow up on 08/21 and 08/23 before getting involved with other specialties, so as not to overwhelm Catherine. Advised she call nephrology to schedule since they book far out in advance and can cancel if they decide to at a later date. Phone number given.   Discussed basic information about chronic kidney disease, that is can happen over the course of months to many years. Adequate hydration is important as well as limiting salt intake. Avoid otc NSAIDS. NO further questions at this time.   Lilibeth GRIGGS RN

## 2024-08-20 NOTE — PATIENT INSTRUCTIONS
"UROGYNECOLOGY    POST-OP DAY #1    S: Doing well overall  Ambulating: Walking in halls  Diet: Tolerating regular diet  Flatus: Feels flatus  Pain control: Well controlled  Vaginal Pack: In place  Simon catheter: In place    O:  Vitals: /67 (BP Location: Right arm, Patient Position: Semi-Hernandez's, Cuff Size: Adult Regular)   Pulse 65   Temp 98  F (36.7  C) (Oral)   Resp 16   Ht 1.689 m (5' 6.5\")   Wt 84.6 kg (186 lb 6.4 oz)   SpO2 98%   BMI 29.64 kg/m    BMI= Body mass index is 29.64 kg/m .      Intake/Output Summary (Last 24 hours) at 8/20/2024 0700  Last data filed at 8/20/2024 0445  Gross per 24 hour   Intake 1700 ml   Output 1500 ml   Net 200 ml       Exam:  Appears healthy and well, A&O x3  Abdomen is soft, slight bloating, incisions C/D/I, good BS  Ext SCD, no edema  Simon catheter in place  Vaginal packing in place  no perineal edema, incisions intact.    Labs:  HGB:  pre-op 12.6   Post-op 10.4    Assessment and Plan:  POD# 1  A) Post-Operative Care:  ambulate   ADAT  continue with pain control strategies.  perform voiding trial when able to ambulate without assistance.  I reviewed post-operative instructions and precautions/ written information provided.  Discharge home anticipated today  Follow-up based on findings of voiding trial.    Melanie Art PA-C  " -No change to medication today. We will adjust based on lab results.   -Stop taking the iodine supplement.

## 2024-08-21 ENCOUNTER — HOSPITAL ENCOUNTER (OUTPATIENT)
Dept: CARDIOLOGY | Facility: CLINIC | Age: 84
Discharge: HOME | End: 2024-08-21
Attending: NURSE PRACTITIONER | Admitting: NURSE PRACTITIONER
Payer: MEDICARE

## 2024-08-21 DIAGNOSIS — R00.1 BRADYCARDIA: ICD-10-CM

## 2024-08-21 DIAGNOSIS — R23.2 FLUSHING: ICD-10-CM

## 2024-08-21 DIAGNOSIS — I47.10 SVT (SUPRAVENTRICULAR TACHYCARDIA) (H): ICD-10-CM

## 2024-08-21 DIAGNOSIS — R55 NEAR SYNCOPE: ICD-10-CM

## 2024-08-21 LAB — LVEF ECHO: NORMAL

## 2024-08-21 PROCEDURE — 93306 TTE W/DOPPLER COMPLETE: CPT

## 2024-08-21 PROCEDURE — 93306 TTE W/DOPPLER COMPLETE: CPT | Mod: 26 | Performed by: INTERNAL MEDICINE

## 2024-08-23 ENCOUNTER — OFFICE VISIT (OUTPATIENT)
Dept: CARDIOLOGY | Facility: CLINIC | Age: 84
End: 2024-08-23
Attending: NURSE PRACTITIONER
Payer: COMMERCIAL

## 2024-08-23 VITALS
HEART RATE: 53 BPM | BODY MASS INDEX: 22.45 KG/M2 | RESPIRATION RATE: 16 BRPM | WEIGHT: 130.8 LBS | OXYGEN SATURATION: 92 % | DIASTOLIC BLOOD PRESSURE: 62 MMHG | SYSTOLIC BLOOD PRESSURE: 128 MMHG

## 2024-08-23 DIAGNOSIS — R00.1 BRADYCARDIA: ICD-10-CM

## 2024-08-23 DIAGNOSIS — I47.10 SVT (SUPRAVENTRICULAR TACHYCARDIA) (H): ICD-10-CM

## 2024-08-23 PROCEDURE — 99244 OFF/OP CNSLTJ NEW/EST MOD 40: CPT | Performed by: INTERNAL MEDICINE

## 2024-08-23 RX ORDER — LEVOTHYROXINE SODIUM 75 UG/1
75 TABLET ORAL DAILY
COMMUNITY
Start: 2024-05-23

## 2024-08-23 NOTE — PROGRESS NOTES
Thank you, Dr. Zayas, for asking [facility] to evaluate Ms. Catherine Salgado.      Assessment/Recommendations   Assessment:    Syncope, likely vasovagal mechanism  Sinus bradycardia, mild, asymptomatic  Atrial tachycardia, short runs, asymptomatic    Plan:  We went over the results of echo and event monitor.  She does not have any significant heart rhythm abnormalities to explain syncope.  Echo and physical exam were unremarkable today.  I am highly suspicious that she had a vasovagal episode similar to what she was experiencing as a child.  I recommended proper hydration and avoidance of prolonged standing.  I encouraged her to contact me if she were to have more episodes in the future.  We may consider implantable loop recorder       History of Present Illness    Ms. Catherine Salgado is a 84 year old female who comes in for cardiac evaluation.  1 month ago she was standing in a crowded restaurant for about 35 to 40 minutes when she became very lightheaded and sweaty.  She went down to sit at the table and then passed out.  She did not have any chest pain, heart palpitation or shortness of breath.  Paramedics were summoned and she was brought to emergency room.  She had normal ER evaluation.  She underwent outpatient echo and Zio patch.  She is here to discuss results.  She reports that she has not had any recurrent syncope since ER visit.  Overall she feels well.  She has no chest pain or shortness of breath.  She reports that she had frequent syncope as a child growing up especially when she was in [employer] waiting for the [employer].  She has no history of known heart disease    ECG: Personally reviewed.  7/13/2024 sinus bradycardia rate of 56 bpm borderline first-degree AV block otherwise normal    Echocardiogram: August 2024  1. The left ventricle is normal in size. Proximal septal thickening is noted.  Left ventricular systolic function is normal. The visual ejection fraction  is  60-65%. Diastolic Doppler findings (E/E' ratio and/or other parameters)  suggest left ventricular filling pressures are normal. No regional wall motion  abnormalities noted.  2. The right ventricle is normal size. The right ventricular systolic function  is normal.  3. Normal left atrial size. Right atrial size is normal. The atrial septum is  aneurysmal. There is no color Doppler evidence of an atrial shunt (previous  study in 11/2015 reported a positive bubble study consistent with a small  PFO).  4. Trace to mild mitral and tricuspid regurgitation.  5. No pericardial effusion.  6. In comparison to the previous report dated 11/01/2015, the findings are  similar.    Zio patch: August 2024  No profound bradycardia or pauses , slowest heart rate in 40s  A few brief(a few seconds long) runs of atrial tachycardia     Physical Examination Review of Systems   /62 (BP Location: Right arm, Patient Position: Sitting, Cuff Size: Adult Regular)   Pulse 53   Resp 16   Wt 59.3 kg (130 lb 12.8 oz)   LMP  (LMP Unknown)   SpO2 92%   BMI 22.45 kg/m    Body mass index is 22.45 kg/m .  Wt Readings from Last 3 Encounters:   08/23/24 59.3 kg (130 lb 12.8 oz)   08/07/24 58.5 kg (129 lb)   07/13/24 55.3 kg (122 lb)     General Appearance:   Alert, cooperative, no distress, appears stated age   Head/ENT: Normocephalic, without obvious abnormality. Membranes moist      EYES:  no scleral icterus, normal conjunctivae   Neck: Supple, symmetrical, trachea midline, no adenopathy, thyroid: not enlarged, symmetric, no carotid bruit or JVD   Chest/Lungs:   Lungs are clear to auscultation, respirations unlabored. No tenderness or deformity    Cardiovascular:   Regular rhythm, S1, S2 normal, no murmur, rub or gallop.   Abdomen:  Soft, non-tender, bowel sounds active all four quadrants,  no masses, no organomegaly   Extremities: no cyanosis or clubbing. No edema   Skin: Skin color, texture, turgor normal, no rashes or lesions.   "  Psychiatric: Normal affect, calm   Neurologic: Alert and oriented x 3, moving all four extremities.     Enc Vitals  BP: 128/62  Pulse: 53  Resp: 16  SpO2: 92 %  Weight: 59.3 kg (130 lb 12.8 oz)                                          Lab Results    Chemistry/lipid CBC Cardiac Enzymes/BNP/TSH/INR   Recent Labs   Lab Test 08/09/24  1412   CHOL 225*   HDL 57   *   TRIG 138     Recent Labs   Lab Test 08/09/24  1412 05/16/23  1341 05/03/22  1334   * 125* 183*     Recent Labs   Lab Test 08/09/24  1412      POTASSIUM 4.7   CHLORIDE 106   CO2 28   *   BUN 33.2*   CR 1.21*   GFRESTIMATED 45*   ANDREY 10.2     Recent Labs   Lab Test 08/09/24  1412 07/13/24  1836 05/21/24  1046   CR 1.21* 1.30* 1.10*     Recent Labs   Lab Test 08/09/24  1412   A1C 5.8*          Recent Labs   Lab Test 07/13/24  1836   WBC 8.2   HGB 13.3   HCT 39.9   MCV 96        Recent Labs   Lab Test 07/13/24  1836 04/09/24  1004 05/16/23  1341   HGB 13.3 14.3 13.9    No results for input(s): \"TROPONINI\" in the last 23974 hours.  No results for input(s): \"BNP\", \"NTBNPI\", \"NTBNP\" in the last 64253 hours.  Recent Labs   Lab Test 07/13/24  1836   TSH 1.10     No results for input(s): \"INR\" in the last 37474 hours.     Medical History  Surgical History Family History Social History   Past Medical History:   Diagnosis Date    Bunion     Bilateral    Chest pain, unspecified     Generalized osteoarthrosis, unspecified site     Cervical    Macular degeneration (senile) of retina, unspecified     Other and unspecified hyperlipidemia     Phlebitis and thrombophlebitis of other deep vessels of lower extremities     DVT on OCP    TOBACCO ABUSE-CONTINUOUS     Unspecified essential hypertension      Past Surgical History:   Procedure Laterality Date    COLONOSCOPY  5/27/2005    Diverticulosis    SURGICAL HISTORY OF -       T&A    SURGICAL HISTORY OF -   10/1976    Vaginal hysterectomy, A & P repair     No premature CAD, " SCD,cardiomyopathy   Social History     Socioeconomic History    Marital status:      Spouse name: Not on file    Number of children: Not on file    Years of education: Not on file    Highest education level: Not on file   Occupational History    Not on file   Tobacco Use    Smoking status: Every Day     Current packs/day: 0.50     Average packs/day: 0.5 packs/day for 50.0 years (25.0 ttl pk-yrs)     Types: Cigarettes    Smokeless tobacco: Never    Tobacco comments:     Under a half a pack a day about    Vaping Use    Vaping status: Never Used   Substance and Sexual Activity    Alcohol use: Not Currently     Comment: rare    Drug use: No    Sexual activity: Yes     Partners: Male     Birth control/protection: Surgical   Other Topics Concern    Parent/sibling w/ CABG, MI or angioplasty before 65F 55M? Yes     Comment: father   Social History Narrative    Not on file     Social Determinants of Health     Financial Resource Strain: Not on file   Food Insecurity: Not on file   Transportation Needs: Not on file   Physical Activity: Not on file   Stress: Not on file   Social Connections: Not on file   Interpersonal Safety: Low Risk  (4/9/2024)    Interpersonal Safety     Do you feel physically and emotionally safe where you currently live?: Yes     Within the past 12 months, have you been hit, slapped, kicked or otherwise physically hurt by someone?: No     Within the past 12 months, have you been humiliated or emotionally abused in other ways by your partner or ex-partner?: No   Housing Stability: Not on file         Medications  Allergies   Current Outpatient Medications   Medication Sig Dispense Refill    aspirin 81 MG EC tablet Take 1 tablet (81 mg) by mouth daily (Patient taking differently: Take 162 mg by mouth daily.)      Cholecalciferol (VITAMIN D-3) 5000 UNIT TABS Take 1 tablet by mouth daily.      EPINEPHrine (ANY BX GENERIC EQUIV) 0.3 MG/0.3ML injection 2-pack Inject 0.3 mLs (0.3 mg) into the muscle as  needed for anaphylaxis 2 each 0    lisinopril (ZESTRIL) 20 MG tablet Take 1 tablet (20 mg) by mouth daily 90 tablet 3    vitamin B complex with vitamin C (STRESS TAB) tablet Take 1 tablet by mouth every evening       levothyroxine (SYNTHROID/LEVOTHROID) 75 MCG tablet Take 75 mcg by mouth daily. (Patient not taking: Reported on 8/23/2024)          Allergies   Allergen Reactions    Wasps [Hornets]      Was stung in face by wasp and had significant facial swelling, Aug 2018    Bees     Codeine Other (See Comments)     Confusion, was given Tylenol # 3 after hysterectomy and developed mild confusion     Gadavist [Gadobutrol] Other (See Comments)     Did ok if benadryl is given.     Ivp Dye [Contrast Dye] Other (See Comments)     Severe arthritic reaction    Pcn [Penicillins] Hives

## 2024-08-23 NOTE — LETTER
8/23/2024    Carleen Walton DNP,, APRN CNP  [address] [facility] [address]    RE: Catherine Salgado       Dear Colleague,     I had the pleasure of seeing Catherine Salgado in the [facility].        Thank you, Dr. Zayas, for asking [facility] to evaluate Ms. Catherine Salgado.      Assessment/Recommendations   Assessment:    Syncope, likely vasovagal mechanism  Sinus bradycardia, mild, asymptomatic  Atrial tachycardia, short runs, asymptomatic    Plan:  We went over the results of echo and event monitor.  She does not have any significant heart rhythm abnormalities to explain syncope.  Echo and physical exam were unremarkable today.  I am highly suspicious that she had a vasovagal episode similar to what she was experiencing as a child.  I recommended proper hydration and avoidance of prolonged standing.  I encouraged her to contact me if she were to have more episodes in the future.  We may consider implantable loop recorder       History of Present Illness    Ms. Catherine Salgado is a 84 year old female who comes in for cardiac evaluation.  1 month ago she was standing in a crowded restaurant for about 35 to 40 minutes when she became very lightheaded and sweaty.  She went down to sit at the table and then passed out.  She did not have any chest pain, heart palpitation or shortness of breath.  Paramedics were summoned and she was brought to emergency room.  She had normal ER evaluation.  She underwent outpatient echo and Zio patch.  She is here to discuss results.  She reports that she has not had any recurrent syncope since ER visit.  Overall she feels well.  She has no chest pain or shortness of breath.  She reports that she had frequent syncope as a child growing up especially when she was in [place] waiting for the [employer].  She has no history of known heart disease    ECG: Personally reviewed.  7/13/2024 sinus bradycardia rate of 56 bpm borderline first-degree AV  block otherwise normal    Echocardiogram: August 2024  1. The left ventricle is normal in size. Proximal septal thickening is noted.  Left ventricular systolic function is normal. The visual ejection fraction is  60-65%. Diastolic Doppler findings (E/E' ratio and/or other parameters)  suggest left ventricular filling pressures are normal. No regional wall motion  abnormalities noted.  2. The right ventricle is normal size. The right ventricular systolic function  is normal.  3. Normal left atrial size. Right atrial size is normal. The atrial septum is  aneurysmal. There is no color Doppler evidence of an atrial shunt (previous  study in 11/2015 reported a positive bubble study consistent with a small  PFO).  4. Trace to mild mitral and tricuspid regurgitation.  5. No pericardial effusion.  6. In comparison to the previous report dated 11/01/2015, the findings are  similar.    Zio patch: August 2024  No profound bradycardia or pauses , slowest heart rate in 40s  A few brief(a few seconds long) runs of atrial tachycardia     Physical Examination Review of Systems   /62 (BP Location: Right arm, Patient Position: Sitting, Cuff Size: Adult Regular)   Pulse 53   Resp 16   Wt 59.3 kg (130 lb 12.8 oz)   LMP  (LMP Unknown)   SpO2 92%   BMI 22.45 kg/m    Body mass index is 22.45 kg/m .  Wt Readings from Last 3 Encounters:   08/23/24 59.3 kg (130 lb 12.8 oz)   08/07/24 58.5 kg (129 lb)   07/13/24 55.3 kg (122 lb)     General Appearance:   Alert, cooperative, no distress, appears stated age   Head/ENT: Normocephalic, without obvious abnormality. Membranes moist      EYES:  no scleral icterus, normal conjunctivae   Neck: Supple, symmetrical, trachea midline, no adenopathy, thyroid: not enlarged, symmetric, no carotid bruit or JVD   Chest/Lungs:   Lungs are clear to auscultation, respirations unlabored. No tenderness or deformity    Cardiovascular:   Regular rhythm, S1, S2 normal, no murmur, rub or gallop.   Abdomen:   "Soft, non-tender, bowel sounds active all four quadrants,  no masses, no organomegaly   Extremities: no cyanosis or clubbing. No edema   Skin: Skin color, texture, turgor normal, no rashes or lesions.    Psychiatric: Normal affect, calm   Neurologic: Alert and oriented x 3, moving all four extremities.     Enc Vitals  BP: 128/62  Pulse: 53  Resp: 16  SpO2: 92 %  Weight: 59.3 kg (130 lb 12.8 oz)                                          Lab Results    Chemistry/lipid CBC Cardiac Enzymes/BNP/TSH/INR   Recent Labs   Lab Test 08/09/24  1412   CHOL 225*   HDL 57   *   TRIG 138     Recent Labs   Lab Test 08/09/24  1412 05/16/23  1341 05/03/22  1334   * 125* 183*     Recent Labs   Lab Test 08/09/24  1412      POTASSIUM 4.7   CHLORIDE 106   CO2 28   *   BUN 33.2*   CR 1.21*   GFRESTIMATED 45*   ANDREY 10.2     Recent Labs   Lab Test 08/09/24  1412 07/13/24  1836 05/21/24  1046   CR 1.21* 1.30* 1.10*     Recent Labs   Lab Test 08/09/24  1412   A1C 5.8*          Recent Labs   Lab Test 07/13/24  1836   WBC 8.2   HGB 13.3   HCT 39.9   MCV 96        Recent Labs   Lab Test 07/13/24  1836 04/09/24  1004 05/16/23  1341   HGB 13.3 14.3 13.9    No results for input(s): \"TROPONINI\" in the last 63966 hours.  No results for input(s): \"BNP\", \"NTBNPI\", \"NTBNP\" in the last 73152 hours.  Recent Labs   Lab Test 07/13/24  1836   TSH 1.10     No results for input(s): \"INR\" in the last 48327 hours.     Medical History  Surgical History Family History Social History   Past Medical History:   Diagnosis Date     Bunion     Bilateral     Chest pain, unspecified      Generalized osteoarthrosis, unspecified site     Cervical     Macular degeneration (senile) of retina, unspecified      Other and unspecified hyperlipidemia      Phlebitis and thrombophlebitis of other deep vessels of lower extremities     DVT on OCP     TOBACCO ABUSE-CONTINUOUS      Unspecified essential hypertension      Past Surgical History:   Procedure " Laterality Date     COLONOSCOPY  5/27/2005    Diverticulosis     SURGICAL HISTORY OF -       T&A     SURGICAL HISTORY OF -   10/1976    Vaginal hysterectomy, A & P repair     No premature CAD, SCD,cardiomyopathy   Social History     Socioeconomic History     Marital status:      Spouse name: Not on file     Number of children: Not on file     Years of education: Not on file     Highest education level: Not on file   Occupational History     Not on file   Tobacco Use     Smoking status: Every Day     Current packs/day: 0.50     Average packs/day: 0.5 packs/day for 50.0 years (25.0 ttl pk-yrs)     Types: Cigarettes     Smokeless tobacco: Never     Tobacco comments:     Under a half a pack a day about    Vaping Use     Vaping status: Never Used   Substance and Sexual Activity     Alcohol use: Not Currently     Comment: rare     Drug use: No     Sexual activity: Yes     Partners: Male     Birth control/protection: Surgical   Other Topics Concern     Parent/sibling w/ CABG, MI or angioplasty before 65F 55M? Yes     Comment: father   Social History Narrative     Not on file     Social Determinants of Health     Financial Resource Strain: Not on file   Food Insecurity: Not on file   Transportation Needs: Not on file   Physical Activity: Not on file   Stress: Not on file   Social Connections: Not on file   Interpersonal Safety: Low Risk  (4/9/2024)    Interpersonal Safety      Do you feel physically and emotionally safe where you currently live?: Yes      Within the past 12 months, have you been hit, slapped, kicked or otherwise physically hurt by someone?: No      Within the past 12 months, have you been humiliated or emotionally abused in other ways by your partner or ex-partner?: No   Housing Stability: Not on file         Medications  Allergies   Current Outpatient Medications   Medication Sig Dispense Refill     aspirin 81 MG EC tablet Take 1 tablet (81 mg) by mouth daily (Patient taking differently: Take 162 mg  by mouth daily.)       Cholecalciferol (VITAMIN D-3) 5000 UNIT TABS Take 1 tablet by mouth daily.       EPINEPHrine (ANY BX GENERIC EQUIV) 0.3 MG/0.3ML injection 2-pack Inject 0.3 mLs (0.3 mg) into the muscle as needed for anaphylaxis 2 each 0     lisinopril (ZESTRIL) 20 MG tablet Take 1 tablet (20 mg) by mouth daily 90 tablet 3     vitamin B complex with vitamin C (STRESS TAB) tablet Take 1 tablet by mouth every evening        levothyroxine (SYNTHROID/LEVOTHROID) 75 MCG tablet Take 75 mcg by mouth daily. (Patient not taking: Reported on 8/23/2024)          Allergies   Allergen Reactions     Wasps [Hornets]      Was stung in face by wasp and had significant facial swelling, Aug 2018     Bees      Codeine Other (See Comments)     Confusion, was given Tylenol # 3 after hysterectomy and developed mild confusion      Gadavist [Gadobutrol] Other (See Comments)     Did ok if benadryl is given.      Ivp Dye [Contrast Dye] Other (See Comments)     Severe arthritic reaction     Pcn [Penicillins] Hives                        Thank you for allowing me to participate in the care of your patient.      Sincerely,     Sergio Jean MD     [facility]  cc:   Natalie Zayas APRN CNP  [address] [place] [id number]

## 2024-08-24 ENCOUNTER — HEALTH MAINTENANCE LETTER (OUTPATIENT)
Age: 84
End: 2024-08-24

## 2024-09-09 ENCOUNTER — TRANSFERRED RECORDS (OUTPATIENT)
Dept: HEALTH INFORMATION MANAGEMENT | Facility: CLINIC | Age: 84
End: 2024-09-09

## 2024-09-16 ENCOUNTER — ALLIED HEALTH/NURSE VISIT (OUTPATIENT)
Dept: FAMILY MEDICINE | Facility: CLINIC | Age: 84
End: 2024-09-16
Payer: MEDICARE

## 2024-09-16 DIAGNOSIS — Z23 ENCOUNTER FOR IMMUNIZATION: Primary | ICD-10-CM

## 2024-09-16 PROCEDURE — 90471 IMMUNIZATION ADMIN: CPT

## 2024-09-16 PROCEDURE — 90656 IIV3 VACC NO PRSV 0.5 ML IM: CPT

## 2024-09-16 PROCEDURE — 99207 PR NO CHARGE NURSE ONLY: CPT

## 2024-09-26 ENCOUNTER — TELEPHONE (OUTPATIENT)
Dept: FAMILY MEDICINE | Facility: CLINIC | Age: 84
End: 2024-09-26
Payer: MEDICARE

## 2024-09-26 NOTE — TELEPHONE ENCOUNTER
Patients daughter calling. Catherine would like to have lab orders placed for her lab visit on 9/14. Patient specifically requesting to have hormone and hemoglobin done.     Patient has office visit with Natalie on 10/25       No call back needed.

## 2024-10-01 NOTE — TELEPHONE ENCOUNTER
Please notify patient's daughter, orders are already placed for her thyroid.  No need to repeat her hemoglobin as that was recently done and has been normal.  Please also verify that she has been off of her levothyroxine.    Thanks,  Natalie Durant, DNP, APRN-CNP

## 2024-10-14 ENCOUNTER — LAB (OUTPATIENT)
Dept: LAB | Facility: CLINIC | Age: 84
End: 2024-10-14
Payer: MEDICARE

## 2024-10-14 DIAGNOSIS — E03.9 HYPOTHYROIDISM, UNSPECIFIED TYPE: ICD-10-CM

## 2024-10-14 LAB
T4 FREE SERPL-MCNC: 0.92 NG/DL (ref 0.9–1.7)
TSH SERPL DL<=0.005 MIU/L-ACNC: 15.56 UIU/ML (ref 0.3–4.2)

## 2024-10-14 PROCEDURE — 84439 ASSAY OF FREE THYROXINE: CPT

## 2024-10-14 PROCEDURE — 84443 ASSAY THYROID STIM HORMONE: CPT

## 2024-10-14 PROCEDURE — 36415 COLL VENOUS BLD VENIPUNCTURE: CPT

## 2024-10-25 ENCOUNTER — OFFICE VISIT (OUTPATIENT)
Dept: FAMILY MEDICINE | Facility: CLINIC | Age: 84
End: 2024-10-25
Payer: MEDICARE

## 2024-10-25 VITALS
HEART RATE: 64 BPM | WEIGHT: 130.6 LBS | SYSTOLIC BLOOD PRESSURE: 118 MMHG | BODY MASS INDEX: 22.3 KG/M2 | RESPIRATION RATE: 16 BRPM | DIASTOLIC BLOOD PRESSURE: 82 MMHG | OXYGEN SATURATION: 96 % | HEIGHT: 64 IN | TEMPERATURE: 96.9 F

## 2024-10-25 DIAGNOSIS — E03.9 HYPOTHYROIDISM, UNSPECIFIED TYPE: Primary | ICD-10-CM

## 2024-10-25 PROCEDURE — 99213 OFFICE O/P EST LOW 20 MIN: CPT | Performed by: NURSE PRACTITIONER

## 2024-10-25 RX ORDER — LEVOTHYROXINE SODIUM 25 UG/1
25 TABLET ORAL DAILY
Qty: 90 TABLET | Refills: 0 | Status: SHIPPED | OUTPATIENT
Start: 2024-10-25

## 2024-10-25 ASSESSMENT — ENCOUNTER SYMPTOMS: SYNCOPE: 1

## 2024-10-25 ASSESSMENT — PAIN SCALES - GENERAL: PAINLEVEL_OUTOF10: NO PAIN (0)

## 2024-10-25 NOTE — PROGRESS NOTES
Assessment & Plan     Hypothyroidism, unspecified type  Patient has had a several year history of hypothyroidism, started treatment in 2013.  Had a normal T4 at that time.  Most recently patient's dosing was increased to 100 mcg due to slight elevation in TSH and normal T4, shortly after patient started having symptoms of near syncopal episodes accompanied by diaphoresis and weakness.  Patient stopped medication for a while and symptoms completely resolved.  Patient has been off of medication for couple months, most recently checked and TSH elevated at 15.56 with a normal circulating hormone.  Did discuss subclinical hypothyroidism with patient and .  Typically would not treat if TSH below 10-12.  Due to elevation, would recommend starting on a low-dose of levothyroxine, however avoiding higher doses.  Patient and  are agreeable to this.  Would recommend rechecking levels in approximately 6 to 8 weeks, patient to schedule lab only appointment.  Will determine any dosage adjustments at that time.  - levothyroxine (SYNTHROID/LEVOTHROID) 25 MCG tablet; Take 1 tablet (25 mcg) by mouth daily.  - TSH with free T4 reflex; Future            See Patient Instructions    Chelsey Alexander is a 84 year old, presenting for the following health issues:  Thyroid Problem, Hypertension, and Syncope ( Vasovagal near syncope & Diaphoresis follow up from 8/17/24)        10/25/2024    12:37 PM   Additional Questions   Roomed by Rosa GUTIÉRREZ([facility])     Syncope     History of Present Illness       Hypertension: She presents for follow up of hypertension.  She does not check blood pressure  regularly outside of the clinic. Outpatient blood pressures have not been over 140/90. She follows a low salt diet.     Hypothyroidism:     Since last visit, patient describes the following symptoms::  Dry skin and Weight loss    Weight loss::  Less than 5 lbs.    She eats 4 or more servings of fruits and vegetables daily.She consumes 0  sweetened beverage(s) daily.She exercises with enough effort to increase her heart rate 9 or less minutes per day.  She exercises with enough effort to increase her heart rate 3 or less days per week.   She is taking medications regularly.     Taking Asprin 81 mg - keep taking because was concern related to her previous stroke.          Review of Systems  Constitutional, HEENT, cardiovascular, pulmonary, gi and gu systems are negative, except as otherwise noted.      Objective    LMP  (LMP Unknown)   There is no height or weight on file to calculate BMI.  Physical Exam   GENERAL: alert and no distress  NECK: no adenopathy, no asymmetry, masses, or scars  RESP: lungs clear to auscultation - no rales, rhonchi or wheezes  CV: regular rate and rhythm, normal S1 S2, no S3 or S4, no murmur, click or rub, no peripheral edema  ABDOMEN: soft, nontender, no hepatosplenomegaly, no masses and bowel sounds normal  MS: no gross musculoskeletal defects noted, no edema  SKIN: no suspicious lesions or rashes  PSYCH: mentation appears normal, affect normal/bright    Diagnostic Test Results:  Labs reviewed in Epic         Signed Electronically by: Carleen Walton DNP,, ROSALEE CNP      Chart documentation with Dragon Voice recognition Software. Although reviewed after completion, some words and grammatical errors may remain.

## 2024-10-25 NOTE — NURSING NOTE
"Chief Complaint   Patient presents with    Thyroid Problem    Hypertension    Syncope      Vasovagal near syncope & Diaphoresis follow up from 8/17/24       Initial LMP  (LMP Unknown)  Estimated body mass index is 22.45 kg/m  as calculated from the following:    Height as of 8/7/24: 1.626 m (5' 4\").    Weight as of 8/23/24: 59.3 kg (130 lb 12.8 oz).    Patient presents to the clinic using No DME    Is there anyone who you would like to be able to receive your results? No  If yes have patient fill out SIMIN      "

## 2024-10-25 NOTE — PATIENT INSTRUCTIONS
Thyroid was elevated ~ higher than I'd like it even though the circulating is normal.    Would recommend starting on low dose Levothyroxine ~ 25 mcg     Schedule a lab only appointment in 6-8 weeks for a recheck of your levels

## 2024-11-02 DIAGNOSIS — E78.5 HYPERLIPEMIA: ICD-10-CM

## 2024-11-02 DIAGNOSIS — N18.31 CHRONIC KIDNEY DISEASE (CKD) STAGE G3A/A1, MODERATELY DECREASED GLOMERULAR FILTRATION RATE (GFR) BETWEEN 45-59 ML/MIN/1.73 SQUARE METER AND ALBUMINURIA CREATININE RATIO LESS THAN 30 MG/G (H): Primary | ICD-10-CM

## 2024-11-02 DIAGNOSIS — I10 ESSENTIAL HYPERTENSION, MALIGNANT: ICD-10-CM

## 2024-11-02 DIAGNOSIS — R80.9 PROTEINURIA: ICD-10-CM

## 2024-11-08 ENCOUNTER — HOSPITAL ENCOUNTER (OUTPATIENT)
Dept: ULTRASOUND IMAGING | Facility: CLINIC | Age: 84
Discharge: HOME | End: 2024-11-08
Attending: INTERNAL MEDICINE | Admitting: INTERNAL MEDICINE
Payer: MEDICARE

## 2024-11-08 DIAGNOSIS — N18.31 STAGE 3A CHRONIC KIDNEY DISEASE (H): ICD-10-CM

## 2024-11-08 DIAGNOSIS — E78.5 HYPERLIPIDEMIA, UNSPECIFIED HYPERLIPIDEMIA TYPE: ICD-10-CM

## 2024-11-08 DIAGNOSIS — R80.9 PROTEINURIA, UNSPECIFIED: ICD-10-CM

## 2024-11-08 DIAGNOSIS — I10 HYPERTENSIVE DISORDER: ICD-10-CM

## 2024-11-08 PROCEDURE — 76770 US EXAM ABDO BACK WALL COMP: CPT

## 2024-11-14 DIAGNOSIS — R80.9 PROTEINURIA: ICD-10-CM

## 2024-11-14 DIAGNOSIS — N18.31 CHRONIC KIDNEY DISEASE (CKD) STAGE G3A/A1, MODERATELY DECREASED GLOMERULAR FILTRATION RATE (GFR) BETWEEN 45-59 ML/MIN/1.73 SQUARE METER AND ALBUMINURIA CREATININE RATIO LESS THAN 30 MG/G (H): Primary | ICD-10-CM

## 2024-11-18 ENCOUNTER — TELEPHONE (OUTPATIENT)
Dept: FAMILY MEDICINE | Facility: CLINIC | Age: 84
End: 2024-11-18

## 2024-11-18 NOTE — TELEPHONE ENCOUNTER
Patient calling regarding lab orders  She has an order for 24 hour urine  Super saturation profile, 24 hour urine  Advised she can  the container at [facility] lab  Appointment scheduled today, requested to go in before 430 (last appointment)    Lilibeth Ramirez RN on 11/18/2024 at 11:18 AM

## 2024-11-19 ENCOUNTER — LAB (OUTPATIENT)
Dept: LAB | Facility: CLINIC | Age: 84
End: 2024-11-19
Payer: COMMERCIAL

## 2024-11-19 DIAGNOSIS — N18.31 CHRONIC KIDNEY DISEASE (CKD) STAGE G3A/A1, MODERATELY DECREASED GLOMERULAR FILTRATION RATE (GFR) BETWEEN 45-59 ML/MIN/1.73 SQUARE METER AND ALBUMINURIA CREATININE RATIO LESS THAN 30 MG/G (H): ICD-10-CM

## 2024-11-19 DIAGNOSIS — R80.9 PROTEINURIA: ICD-10-CM

## 2024-11-19 PROCEDURE — 99000 SPECIMEN HANDLING OFFICE-LAB: CPT

## 2025-01-22 ENCOUNTER — MYC MEDICAL ADVICE (OUTPATIENT)
Dept: FAMILY MEDICINE | Facility: CLINIC | Age: 85
End: 2025-01-22
Payer: MEDICARE

## 2025-01-22 DIAGNOSIS — E03.9 HYPOTHYROIDISM, UNSPECIFIED TYPE: ICD-10-CM

## 2025-01-22 RX ORDER — LEVOTHYROXINE SODIUM 25 UG/1
25 TABLET ORAL DAILY
Qty: 90 TABLET | Refills: 0 | OUTPATIENT
Start: 2025-01-22

## 2025-01-22 NOTE — TELEPHONE ENCOUNTER
Overdue for needed care. Please call to schedule lab only appt for TSH recheck, has order in place. Once appt is scheduled, route back to med refill pool.    Julie Behrendt RN

## 2025-01-23 RX ORDER — LEVOTHYROXINE SODIUM 25 UG/1
25 TABLET ORAL DAILY
Qty: 30 TABLET | Refills: 0 | Status: SHIPPED | OUTPATIENT
Start: 2025-01-23

## 2025-01-23 NOTE — TELEPHONE ENCOUNTER
Requesting bridge refill of her synthroid 25 mcg, has lab appointment on 1/24/25.    TSH   Date Value Ref Range Status   10/14/2024 15.56 (H) 0.30 - 4.20 uIU/mL Final   08/18/2022 7.69 (H) 0.40 - 4.00 mU/L Final   05/28/2021 3.17 0.40 - 4.00 mU/L Final     Last office visit: 10/25/2024 ; last virtual visit: Visit date not found with prescribing provider:     Future Office Visit:        Julie Behrendt RN

## 2025-01-27 DIAGNOSIS — E03.9 HYPOTHYROIDISM, UNSPECIFIED TYPE: Primary | ICD-10-CM

## 2025-01-27 RX ORDER — LEVOTHYROXINE SODIUM 50 UG/1
50 TABLET ORAL DAILY
Qty: 90 TABLET | Refills: 0 | Status: SHIPPED | OUTPATIENT
Start: 2025-01-27

## 2025-01-27 NOTE — TELEPHONE ENCOUNTER
Pt called back and advised of Provider note. Has already had labs drawn, see lab result note from 1-24-25.    Daly Gutierrez RN

## 2025-01-27 NOTE — PROGRESS NOTES
Called patient she scheduled a lab appt and follow up with her PCP for 3/10/25. She is aware of the medication change and will  at pharmacy.     Brandy Gutierrez/ Patient

## 2025-01-27 NOTE — PROGRESS NOTES
Higher dose of Levothyroxine was placed. Please tell patient to  and start taking. Needs repeat TSH in 6 weeks. Future lab orders are in.     Carlitos De La Torre PA-C

## 2025-02-11 ENCOUNTER — HOSPITAL ENCOUNTER (OUTPATIENT)
Facility: CLINIC | Age: 85
Setting detail: OBSERVATION
Discharge: HOME | End: 2025-02-12
Attending: EMERGENCY MEDICINE | Admitting: INTERNAL MEDICINE
Payer: MEDICARE

## 2025-02-11 ENCOUNTER — APPOINTMENT (OUTPATIENT)
Dept: CT IMAGING | Facility: CLINIC | Age: 85
End: 2025-02-11
Attending: EMERGENCY MEDICINE
Payer: MEDICARE

## 2025-02-11 DIAGNOSIS — R55 SYNCOPE, UNSPECIFIED SYNCOPE TYPE: ICD-10-CM

## 2025-02-11 LAB
ALBUMIN SERPL BCG-MCNC: 3.7 G/DL (ref 3.5–5.2)
ALP SERPL-CCNC: 45 U/L (ref 40–150)
ALT SERPL W P-5'-P-CCNC: 13 U/L (ref 0–50)
ANION GAP SERPL CALCULATED.3IONS-SCNC: 11 MMOL/L (ref 7–15)
AST SERPL W P-5'-P-CCNC: 26 U/L (ref 0–45)
BASOPHILS # BLD AUTO: 0.1 10E3/UL (ref 0–0.2)
BASOPHILS NFR BLD AUTO: 1 %
BILIRUB DIRECT SERPL-MCNC: <0.2 MG/DL (ref 0–0.3)
BILIRUB SERPL-MCNC: 0.5 MG/DL
BUN SERPL-MCNC: 30.1 MG/DL (ref 8–23)
CALCIUM SERPL-MCNC: 9.1 MG/DL (ref 8.8–10.4)
CHLORIDE SERPL-SCNC: 106 MMOL/L (ref 98–107)
CREAT SERPL-MCNC: 1.35 MG/DL (ref 0.51–0.95)
D DIMER PPP FEU-MCNC: 1.19 UG/ML FEU (ref 0–0.5)
EGFRCR SERPLBLD CKD-EPI 2021: 39 ML/MIN/1.73M2
EOSINOPHIL # BLD AUTO: 0.2 10E3/UL (ref 0–0.7)
EOSINOPHIL NFR BLD AUTO: 2 %
ERYTHROCYTE [DISTWIDTH] IN BLOOD BY AUTOMATED COUNT: 14.4 % (ref 10–15)
GLUCOSE SERPL-MCNC: 132 MG/DL (ref 70–99)
HCO3 SERPL-SCNC: 26 MMOL/L (ref 22–29)
HCT VFR BLD AUTO: 38.7 % (ref 35–47)
HGB BLD-MCNC: 12.6 G/DL (ref 11.7–15.7)
HOLD SPECIMEN: NORMAL
HOLD SPECIMEN: NORMAL
IMM GRANULOCYTES # BLD: 0 10E3/UL
IMM GRANULOCYTES NFR BLD: 0 %
LIPASE SERPL-CCNC: 38 U/L (ref 13–60)
LYMPHOCYTES # BLD AUTO: 1.7 10E3/UL (ref 0.8–5.3)
LYMPHOCYTES NFR BLD AUTO: 20 %
MCH RBC QN AUTO: 31.4 PG (ref 26.5–33)
MCHC RBC AUTO-ENTMCNC: 32.6 G/DL (ref 31.5–36.5)
MCV RBC AUTO: 97 FL (ref 78–100)
MONOCYTES # BLD AUTO: 0.6 10E3/UL (ref 0–1.3)
MONOCYTES NFR BLD AUTO: 7 %
NEUTROPHILS # BLD AUTO: 6 10E3/UL (ref 1.6–8.3)
NEUTROPHILS NFR BLD AUTO: 70 %
NRBC # BLD AUTO: 0 10E3/UL
NRBC BLD AUTO-RTO: 0 /100
PLATELET # BLD AUTO: 218 10E3/UL (ref 150–450)
POTASSIUM SERPL-SCNC: 4.5 MMOL/L (ref 3.4–5.3)
PROT SERPL-MCNC: 6.7 G/DL (ref 6.4–8.3)
RBC # BLD AUTO: 4.01 10E6/UL (ref 3.8–5.2)
SODIUM SERPL-SCNC: 143 MMOL/L (ref 135–145)
T4 FREE SERPL-MCNC: 1.16 NG/DL (ref 0.9–1.7)
TROPONIN T SERPL HS-MCNC: 12 NG/L
TROPONIN T SERPL HS-MCNC: 13 NG/L
TSH SERPL DL<=0.005 MIU/L-ACNC: 8.08 UIU/ML (ref 0.3–4.2)
WBC # BLD AUTO: 8.6 10E3/UL (ref 4–11)

## 2025-02-11 PROCEDURE — 84484 ASSAY OF TROPONIN QUANT: CPT | Performed by: EMERGENCY MEDICINE

## 2025-02-11 PROCEDURE — G0378 HOSPITAL OBSERVATION PER HR: HCPCS

## 2025-02-11 PROCEDURE — 93005 ELECTROCARDIOGRAM TRACING: CPT

## 2025-02-11 PROCEDURE — 71275 CT ANGIOGRAPHY CHEST: CPT

## 2025-02-11 PROCEDURE — 99285 EMERGENCY DEPT VISIT HI MDM: CPT | Mod: 25

## 2025-02-11 PROCEDURE — 96374 THER/PROPH/DIAG INJ IV PUSH: CPT | Mod: 59

## 2025-02-11 PROCEDURE — 82310 ASSAY OF CALCIUM: CPT | Performed by: EMERGENCY MEDICINE

## 2025-02-11 PROCEDURE — 99285 EMERGENCY DEPT VISIT HI MDM: CPT | Mod: 25 | Performed by: EMERGENCY MEDICINE

## 2025-02-11 PROCEDURE — 84443 ASSAY THYROID STIM HORMONE: CPT | Performed by: EMERGENCY MEDICINE

## 2025-02-11 PROCEDURE — 85379 FIBRIN DEGRADATION QUANT: CPT | Performed by: EMERGENCY MEDICINE

## 2025-02-11 PROCEDURE — 258N000003 HC RX IP 258 OP 636: Performed by: EMERGENCY MEDICINE

## 2025-02-11 PROCEDURE — 84439 ASSAY OF FREE THYROXINE: CPT | Performed by: EMERGENCY MEDICINE

## 2025-02-11 PROCEDURE — 250N000009 HC RX 250: Performed by: EMERGENCY MEDICINE

## 2025-02-11 PROCEDURE — 250N000011 HC RX IP 250 OP 636: Performed by: EMERGENCY MEDICINE

## 2025-02-11 PROCEDURE — 83690 ASSAY OF LIPASE: CPT | Performed by: EMERGENCY MEDICINE

## 2025-02-11 PROCEDURE — 36415 COLL VENOUS BLD VENIPUNCTURE: CPT | Performed by: EMERGENCY MEDICINE

## 2025-02-11 PROCEDURE — 82374 ASSAY BLOOD CARBON DIOXIDE: CPT | Performed by: EMERGENCY MEDICINE

## 2025-02-11 PROCEDURE — 96375 TX/PRO/DX INJ NEW DRUG ADDON: CPT

## 2025-02-11 PROCEDURE — 93010 ELECTROCARDIOGRAM REPORT: CPT | Performed by: EMERGENCY MEDICINE

## 2025-02-11 PROCEDURE — 99222 1ST HOSP IP/OBS MODERATE 55: CPT

## 2025-02-11 PROCEDURE — 85025 COMPLETE CBC W/AUTO DIFF WBC: CPT | Performed by: EMERGENCY MEDICINE

## 2025-02-11 PROCEDURE — 82248 BILIRUBIN DIRECT: CPT | Performed by: EMERGENCY MEDICINE

## 2025-02-11 RX ORDER — LISINOPRIL 20 MG/1
20 TABLET ORAL DAILY
Status: DISCONTINUED | OUTPATIENT
Start: 2025-02-12 | End: 2025-02-12 | Stop reason: HOSPADM

## 2025-02-11 RX ORDER — CALCIUM CARBONATE 500 MG/1
1000 TABLET, CHEWABLE ORAL 4 TIMES DAILY PRN
Status: DISCONTINUED | OUTPATIENT
Start: 2025-02-11 | End: 2025-02-11

## 2025-02-11 RX ORDER — POLYETHYLENE GLYCOL 3350 17 G/17G
17 POWDER, FOR SOLUTION ORAL 2 TIMES DAILY PRN
Status: DISCONTINUED | OUTPATIENT
Start: 2025-02-11 | End: 2025-02-12 | Stop reason: HOSPADM

## 2025-02-11 RX ORDER — IOPAMIDOL 755 MG/ML
66 INJECTION, SOLUTION INTRAVASCULAR ONCE
Status: COMPLETED | OUTPATIENT
Start: 2025-02-11 | End: 2025-02-11

## 2025-02-11 RX ORDER — DIPHENHYDRAMINE HYDROCHLORIDE 50 MG/ML
25 INJECTION INTRAMUSCULAR; INTRAVENOUS ONCE
Status: COMPLETED | OUTPATIENT
Start: 2025-02-11 | End: 2025-02-11

## 2025-02-11 RX ORDER — ONDANSETRON 2 MG/ML
4 INJECTION INTRAMUSCULAR; INTRAVENOUS EVERY 6 HOURS PRN
Status: DISCONTINUED | OUTPATIENT
Start: 2025-02-11 | End: 2025-02-12 | Stop reason: HOSPADM

## 2025-02-11 RX ORDER — AMOXICILLIN 250 MG
2 CAPSULE ORAL 2 TIMES DAILY PRN
Status: DISCONTINUED | OUTPATIENT
Start: 2025-02-11 | End: 2025-02-12 | Stop reason: HOSPADM

## 2025-02-11 RX ORDER — DEXTROSE MONOHYDRATE, SODIUM CHLORIDE, AND POTASSIUM CHLORIDE 50; 1.49; 4.5 G/1000ML; G/1000ML; G/1000ML
INJECTION, SOLUTION INTRAVENOUS CONTINUOUS
Status: DISCONTINUED | OUTPATIENT
Start: 2025-02-11 | End: 2025-02-11

## 2025-02-11 RX ORDER — ONDANSETRON 4 MG/1
4 TABLET, ORALLY DISINTEGRATING ORAL EVERY 6 HOURS PRN
Status: DISCONTINUED | OUTPATIENT
Start: 2025-02-11 | End: 2025-02-12 | Stop reason: HOSPADM

## 2025-02-11 RX ORDER — BISACODYL 10 MG
10 SUPPOSITORY, RECTAL RECTAL DAILY PRN
Status: DISCONTINUED | OUTPATIENT
Start: 2025-02-11 | End: 2025-02-12 | Stop reason: HOSPADM

## 2025-02-11 RX ORDER — LEVOTHYROXINE SODIUM 50 UG/1
50 TABLET ORAL DAILY
Status: DISCONTINUED | OUTPATIENT
Start: 2025-02-12 | End: 2025-02-12 | Stop reason: HOSPADM

## 2025-02-11 RX ORDER — METHYLPREDNISOLONE SODIUM SUCCINATE 125 MG/2ML
125 INJECTION INTRAMUSCULAR; INTRAVENOUS ONCE
Status: COMPLETED | OUTPATIENT
Start: 2025-02-11 | End: 2025-02-11

## 2025-02-11 RX ORDER — AMOXICILLIN 250 MG
1 CAPSULE ORAL 2 TIMES DAILY PRN
Status: DISCONTINUED | OUTPATIENT
Start: 2025-02-11 | End: 2025-02-12 | Stop reason: HOSPADM

## 2025-02-11 RX ORDER — TETRAHYDROZOLINE HCL 0.05 %
1 DROPS OPHTHALMIC (EYE) DAILY
COMMUNITY

## 2025-02-11 RX ADMIN — DIPHENHYDRAMINE HYDROCHLORIDE 25 MG: 50 INJECTION INTRAMUSCULAR; INTRAVENOUS at 19:55

## 2025-02-11 RX ADMIN — SODIUM CHLORIDE 94 ML: 9 INJECTION, SOLUTION INTRAVENOUS at 20:05

## 2025-02-11 RX ADMIN — SODIUM CHLORIDE 500 ML: 9 INJECTION, SOLUTION INTRAVENOUS at 18:19

## 2025-02-11 RX ADMIN — IOPAMIDOL 66 ML: 755 INJECTION, SOLUTION INTRAVENOUS at 20:04

## 2025-02-11 RX ADMIN — SODIUM CHLORIDE 500 ML: 9 INJECTION, SOLUTION INTRAVENOUS at 17:39

## 2025-02-11 RX ADMIN — METHYLPREDNISOLONE SODIUM SUCCINATE 125 MG: 125 INJECTION, POWDER, FOR SOLUTION INTRAMUSCULAR; INTRAVENOUS at 19:55

## 2025-02-11 ASSESSMENT — ENCOUNTER SYMPTOMS
ABDOMINAL PAIN: 0
COLOR CHANGE: 0
AGITATION: 0
LIGHT-HEADEDNESS: 1
VOMITING: 1
SHORTNESS OF BREATH: 0
WEAKNESS: 0
EYE DISCHARGE: 0
FEVER: 0
CHILLS: 0
DIARRHEA: 0
SORE THROAT: 0
HEADACHES: 0
DIAPHORESIS: 1
NAUSEA: 1
DYSURIA: 0
FATIGUE: 1
MYALGIAS: 0
ADENOPATHY: 0
COUGH: 0
CONFUSION: 0
FREQUENCY: 0

## 2025-02-11 ASSESSMENT — ACTIVITIES OF DAILY LIVING (ADL)
ADLS_ACUITY_SCORE: 43
ADLS_ACUITY_SCORE: 41
ADLS_ACUITY_SCORE: 43

## 2025-02-11 NOTE — ED PROVIDER NOTES
History     Chief Complaint   Patient presents with    Syncope     Syncope - syncopal and vomiting after leaving grocery store. EMS notes bradicardic episodes associated with symptoms, HR intermittently into 40's.       HPI  Catherine Salgado is a 84 year old female who has a history of chronic kidney disease and hyperlipidemia who presents after a syncopal episode lasting about 30 seconds.  She was apparently feeling well this morning and went to the grocery store.  While she and her  were driving home, she experienced flushing, diaphoresis, nausea, vomiting, and nearly passed out.  For about 30 seconds,  reports that she seemed to have problems breathing when she had almost lost consciousness.  She last ate breakfast around 9:00 and has had nothing since that time.  She reports a similar episode last July in which she was admitted and they did not find a source of her symptoms other than potentially confusion being related to her blood pressure medications.  Patient denies any chest pain.  She has no known exposures.  She denies abdominal pain.  When she vomited, she reports that there was baked beans in her vomitus which she has not eaten for about 3 days.  She has no bowel or urine complaints.    Patient was brought in by EMS.  They reported her heart rate going into the 40s and she was given a dose of atropine.    Allergies:  Allergies   Allergen Reactions    Wasps [Hornets]      Was stung in face by wasp and had significant facial swelling, Aug 2018    Bees     Codeine Other (See Comments)     Confusion, was given Tylenol # 3 after hysterectomy and developed mild confusion     Contrast Dye Other (See Comments)     Severe arthritic reaction    Gadavist [Gadobutrol] Other (See Comments)     Did ok if benadryl is given.     Pcn [Penicillins] Hives       Problem List:    Patient Active Problem List    Diagnosis Date Noted    Chronic kidney disease, stage 3a (H) 04/12/2024     Priority: Medium     Tobacco abuse 05/20/2021     Priority: Medium    Cerebral infarction (H) 10/31/2015     Priority: Medium     Diagnosis updated by automated process. Provider to review and confirm.      Hypothyroidism 08/14/2013     Priority: Medium    HYPERLIPIDEMIA LDL GOAL <130 10/31/2010     Priority: Medium    Hypertension goal BP (blood pressure) < 140/90      Priority: Medium     dx date - ?  CV risk factors previous smoker, family history and lipids  FH positive for diabetes mellitus and cardiovascular disease  history of renal disease negative      Macular degeneration (senile) of retina      Priority: Medium     Problem list name updated by automated process. Provider to review      Hyperlipidemia      Priority: Medium     CHOL      282   03/11/2005  LDL      177   03/11/2005  HDL       35   03/11/2005  Problem list name updated by automated process. Provider to review      Bunion      Priority: Medium     Bilateral      Generalized osteoarthrosis, unspecified site      Priority: Medium     Cervical          Past Medical History:    Past Medical History:   Diagnosis Date    Bunion     Chest pain, unspecified     Generalized osteoarthrosis, unspecified site     Macular degeneration (senile) of retina, unspecified     Other and unspecified hyperlipidemia     Phlebitis and thrombophlebitis of other deep vessels of lower extremities     TOBACCO ABUSE-CONTINUOUS     Unspecified essential hypertension        Past Surgical History:    Past Surgical History:   Procedure Laterality Date    COLONOSCOPY  5/27/2005    Diverticulosis    SURGICAL HISTORY OF -       T&A    SURGICAL HISTORY OF -   10/1976    Vaginal hysterectomy, A & P repair       Family History:    Family History   Problem Relation Age of Onset    Cerebrovascular Disease Mother         bianca JULIO Father     Cancer Father         kidney CA    Cardiovascular Sister         cardiac arrest    Musculoskeletal Disorder Daughter         rotator cuff, knee  "repair    Diabetes Sister     Musculoskeletal Disorder Daughter         ankle and arm injuries    Musculoskeletal Disorder Daughter         Knees    Hypertension Sister        Social History:  Marital Status:   [2]  Social History     Tobacco Use    Smoking status: Every Day     Current packs/day: 0.50     Average packs/day: 0.5 packs/day for 50.0 years (25.0 ttl pk-yrs)     Types: Cigarettes    Smokeless tobacco: Never    Tobacco comments:     Under a half a pack a day about    Vaping Use    Vaping status: Never Used   Substance Use Topics    Alcohol use: Not Currently     Comment: rare    Drug use: No        Medications:    EPINEPHrine (ANY BX GENERIC EQUIV) 0.3 MG/0.3ML injection 2-pack  levothyroxine (SYNTHROID/LEVOTHROID) 50 MCG tablet  lisinopril (ZESTRIL) 20 MG tablet  OVER-THE-COUNTER  tetrahydrozoline (VISINE) 0.05 % ophthalmic solution  VITAMIN E PO          Review of Systems   Constitutional:  Positive for diaphoresis and fatigue. Negative for chills and fever.   HENT:  Negative for congestion and sore throat.    Eyes:  Negative for discharge.   Respiratory:  Negative for cough and shortness of breath.    Cardiovascular:  Negative for chest pain and leg swelling.   Gastrointestinal:  Positive for nausea and vomiting. Negative for abdominal pain and diarrhea.   Genitourinary:  Negative for dysuria and frequency.   Musculoskeletal:  Negative for myalgias.   Skin:  Negative for color change and rash.   Neurological:  Positive for syncope and light-headedness. Negative for weakness and headaches.   Hematological:  Negative for adenopathy.   Psychiatric/Behavioral:  Negative for agitation, behavioral problems and confusion.        Physical Exam   BP: 108/71  Pulse: 72  Temp: 97.9  F (36.6  C)  Resp: 20  Height: 160 cm (5' 3\")  Weight: 59.9 kg (132 lb)  SpO2: 97 %      Physical Exam  Constitutional:       General: She is not in acute distress.     Appearance: She is well-developed.   HENT:      Head: " Normocephalic and atraumatic.   Eyes:      Conjunctiva/sclera: Conjunctivae normal.      Pupils: Pupils are equal, round, and reactive to light.   Neck:      Thyroid: No thyromegaly.      Trachea: No tracheal deviation.   Cardiovascular:      Rate and Rhythm: Normal rate and regular rhythm.      Heart sounds: Normal heart sounds. No murmur heard.  Pulmonary:      Effort: Pulmonary effort is normal. No respiratory distress.      Breath sounds: Normal breath sounds. No wheezing.   Chest:      Chest wall: No tenderness.   Abdominal:      General: There is no distension.      Palpations: Abdomen is soft.      Tenderness: There is no abdominal tenderness.   Musculoskeletal:         General: No tenderness.      Cervical back: Normal range of motion and neck supple.   Skin:     General: Skin is warm.      Findings: No rash.   Neurological:      Mental Status: She is alert and oriented to person, place, and time.      Sensory: No sensory deficit.   Psychiatric:         Behavior: Behavior normal.         ED Course        Procedures             An EKG was performed.  It was read by me at 1641 PM.  It shows a normal sinus rhythm with a sinus arrhythmia and a rate of 68.  There are no acute ST-T segment abnormalities.    Critical Care time:  none         Results for orders placed or performed during the hospital encounter of 02/11/25 (from the past 24 hours)   Basic metabolic panel   Result Value Ref Range    Sodium 143 135 - 145 mmol/L    Potassium 4.5 3.4 - 5.3 mmol/L    Chloride 106 98 - 107 mmol/L    Carbon Dioxide (CO2) 26 22 - 29 mmol/L    Anion Gap 11 7 - 15 mmol/L    Urea Nitrogen 30.1 (H) 8.0 - 23.0 mg/dL    Creatinine 1.35 (H) 0.51 - 0.95 mg/dL    GFR Estimate 39 (L) >60 mL/min/1.73m2    Calcium 9.1 8.8 - 10.4 mg/dL    Glucose 132 (H) 70 - 99 mg/dL   CBC with platelets and differential   Result Value Ref Range    WBC Count 8.6 4.0 - 11.0 10e3/uL    RBC Count 4.01 3.80 - 5.20 10e6/uL    Hemoglobin 12.6 11.7 - 15.7 g/dL     Hematocrit 38.7 35.0 - 47.0 %    MCV 97 78 - 100 fL    MCH 31.4 26.5 - 33.0 pg    MCHC 32.6 31.5 - 36.5 g/dL    RDW 14.4 10.0 - 15.0 %    Platelet Count 218 150 - 450 10e3/uL    % Neutrophils 70 %    % Lymphocytes 20 %    % Monocytes 7 %    % Eosinophils 2 %    % Basophils 1 %    % Immature Granulocytes 0 %    NRBCs per 100 WBC 0 <1 /100    Absolute Neutrophils 6.0 1.6 - 8.3 10e3/uL    Absolute Lymphocytes 1.7 0.8 - 5.3 10e3/uL    Absolute Monocytes 0.6 0.0 - 1.3 10e3/uL    Absolute Eosinophils 0.2 0.0 - 0.7 10e3/uL    Absolute Basophils 0.1 0.0 - 0.2 10e3/uL    Absolute Immature Granulocytes 0.0 <=0.4 10e3/uL    Absolute NRBCs 0.0 10e3/uL   Hepatic function panel   Result Value Ref Range    Protein Total 6.7 6.4 - 8.3 g/dL    Albumin 3.7 3.5 - 5.2 g/dL    Bilirubin Total 0.5 <=1.2 mg/dL    Alkaline Phosphatase 45 40 - 150 U/L    AST 26 0 - 45 U/L    ALT 13 0 - 50 U/L    Bilirubin Direct <0.20 0.00 - 0.30 mg/dL   Lipase   Result Value Ref Range    Lipase 38 13 - 60 U/L   Troponin T, High Sensitivity   Result Value Ref Range    Troponin T, High Sensitivity 12 <=14 ng/L   TSH with free T4 reflex   Result Value Ref Range    TSH 8.08 (H) 0.30 - 4.20 uIU/mL   T4 free   Result Value Ref Range    Free T4 1.16 0.90 - 1.70 ng/dL   D dimer quantitative   Result Value Ref Range    D-Dimer Quantitative 1.19 (H) 0.00 - 0.50 ug/mL FEU    Narrative    This D-dimer assay is intended for use in conjunction with a clinical pretest probability assessment model to exclude pulmonary embolism (PE) and deep venous thrombosis (DVT) in outpatients suspected of PE or DVT. The cut-off value is 0.50 ug/mL FEU.    For patients 50 years of age or older, the application of age-adjusted cut-off values for D-Dimer may increase the specificity without significant effect on sensitivity. The literature suggested calculation age adjusted cut-off in ug/L = age in years x 10 ug/L. The results in this laboratory are reported as ug/mL rather than ug/L.  The calculation for age adjusted cut off in ug/mL= age in years x 0.01 ug/mL. For example, the cut off for a 76 year old male is 76 x 0.01 ug/mL = 0.76 ug/mL (760 ug/L).    M Lian et al. Age adjusted D-dimer cut-off levels to rule out pulmonary embolism: The ADJUST-PE Study. GM 2014;311:0594-4436.; HJ Ashley et al. Diagnostic accuracy of conventional or age adjusted D-dimer cutoff values in older patients with suspected venous thromboembolism. Systemic review and meta-analysis. BMJ 2013:346:f2492.   EKG 12-lead, tracing only   Result Value Ref Range    Systolic Blood Pressure  mmHg    Diastolic Blood Pressure  mmHg    Ventricular Rate 68 BPM    Atrial Rate 68 BPM    MO Interval 182 ms    QRS Duration 78 ms     ms    QTc 455 ms    P Axis 50 degrees    R AXIS 30 degrees    T Axis 74 degrees    Interpretation ECG       Sinus rhythm with marked sinus arrhythmia  Septal infarct , age undetermined  Abnormal ECG  No previous ECGs available     Troponin T, High Sensitivity   Result Value Ref Range    Troponin T, High Sensitivity 13 <=14 ng/L   CT Chest Pulmonary Embolism w Contrast    Narrative    EXAM: CT CHEST PULMONARY EMBOLISM W CONTRAST  LOCATION: [facility]  DATE: 2/11/2025    INDICATION: syncope, elevated D dimer  COMPARISON: CTA chest 07/01/2017  TECHNIQUE: CT chest pulmonary angiogram during arterial phase injection of IV contrast. Multiplanar reformats and MIP reconstructions were performed. Dose reduction techniques were used.   CONTRAST: 66mL Isovue 370    FINDINGS:  ANGIOGRAM CHEST: Pulmonary arteries are normal caliber and negative for pulmonary emboli. Thoracic aorta is negative for dissection. No CT evidence of right heart strain.    LUNGS AND PLEURA: Moderate changes of centrilobular emphysema, predominantly involving the upper lobes. Dependent atelectasis posterior aspects of both lower lobes. No acute infiltrates or effusions. 4 mm pulmonary nodule right upper lobe  along the   lateral pleural surface on image 159 is minimally changed. No further follow-up required. Calcified granuloma medial aspect of the right lower lobe.    MEDIASTINUM/AXILLAE: Atherosclerotic disease thoracic aorta. No adenopathy. Mild aneurysmal dilatation ascending thoracic aorta measuring 4.1 x 4.1 cm, unchanged.    CORONARY ARTERY CALCIFICATION: Moderate.    UPPER ABDOMEN: Atherosclerotic disease abdominal aorta. Numerous tiny stones at the corticomedullary junction of the left kidney. Right kidney not imaged. Gallstones. Hepatic and splenic granulomas.    MUSCULOSKELETAL: Osteopenia. Degenerative disc disease thoracic spine.      Impression    IMPRESSION:  1.  No evidence for pulmonary emboli.  2.  Dependent subsegmental atelectasis both lower lobes. No acute pulmonary disease.  3.  Emphysema.  4.  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  5.  Gallstones and left kidney stones.       Medications   sodium chloride 0.9% BOLUS 500 mL (0 mLs Intravenous Stopped 2/11/25 1746)   sodium chloride 0.9% BOLUS 500 mL (0 mLs Intravenous Stopped 2/11/25 1846)   methylPREDNISolone Na Suc (solu-MEDROL) injection 125 mg (125 mg Intravenous $Given 2/11/25 1955)   diphenhydrAMINE (BENADRYL) injection 25 mg (25 mg Intravenous $Given 2/11/25 1955)   iopamidol (ISOVUE-370) solution 66 mL (66 mLs Intravenous $Given 2/11/25 2004)   sodium chloride 0.9 % bag 500 mL for CT scan flush use (94 mLs Intravenous $Given 2/11/25 2005)       Assessments & Plan (with Medical Decision Making)     I have reviewed the nursing notes.    I have reviewed the findings, diagnosis, plan and need for follow up with the patient.    84 year old female who has a history of chronic kidney disease and hyperlipidemia who presents after a near syncopal episode.  She was apparently feeling well this morning and went to the grocery store.  While she and her  were driving home, she experienced flushing, diaphoresis, nausea, vomiting,  and passed out for about 30 seconds,  reports that she seemed to have problems breathing when she had lost consciousness.  She last ate breakfast around 9:00 and has had nothing since that time.  She reports a similar episode last July in which she was admitted and they did not find a source of her symptoms other than potentially confusion being related to her blood pressure medications.  Patient denies any chest pain.  She has no known exposures.  She denies abdominal pain.  When she vomited, she reports that there was baked beans in her vomitus which she has not eaten for about 3 days.  She has no bowel or urine complaints.    Patient was brought in by EMS.  They reported her heart rate going into the 40s and she was given a dose of atropine.  She was also given Zofran.    On arrival here, patient's blood pressure was 108/71 with a temperature of 97.9 and pulse rate of 72.  Respirations are 20 with O2 saturations at 97% on O2 4 L nasal cannula.  Patient has no respiratory distress and good breath sounds.  She has a nonfocal neurologic exam.    EKG was performed and showed a sinus arrhythmia with a rate of 68.  There is no acute ST-T segment abnormalities.    It is uncertain why she had this near syncopal episode.  She could have had a vasovagal reaction to her vomiting.  We will look for GI or cardiac sources for her vomiting.  Blood work will be obtained.    Patient's white count was 8.6 with a hemoglobin of 12.6.  Her platelet count was 218.  Hemoglobin 2 months ago was 13.5.    Her basic metabolic profile was remarkable for a BUN of 30.1 and creatinine of 1.35.  Her GFR was 39.  Over the past 10 months, her creatinine has been from 1.2-1.34.    Her hepatic profile and lipase were normal.    High-sensitivity troponin returned at 12.  7 months ago her troponin was 12.    Patient's TSH was elevated at 8.08.  It was 12.2 twelve weeks ago.  Her free T4 was 1.16.    I did review her past cardiac workup.  She  had a 6-day cardiac monitor.  It showed frequent PACs and 16 SVT runs with the longest being 20 beats.  Her lowest heart rate was 41.  Cardiac echo showed trace to mild mitral and tricuspid regurgitation with no pericardial effusion with a normal ejection fraction.    Patient's D-dimer was elevated.  A CT scan of the chest will be ordered to evaluate for pulmonary embolism.    CT scan of the chest was performed and independently read by me.  There is no evidence of infiltrate, pneumothorax, or cardiomegaly.  Formal read showed no evidence of pulmonary emboli with dependent subsegmental atelectasis of both lower lobes.  She also had a stable ascending thoracic aortic aneurysm and evidence of emphysema.  She had gallstones and left kidney stones.    I do not believe that her gallstones or kidney stones caused her vomiting, however this is a possibility that this was biliary colic and a vasovagal syncopal episode.  Patient did have bradycardia which she has had in the past and this was treated with atropine by EMS.    I do believe patient would benefit from an overnight observation admission for continued cardiac monitoring to evaluate for potential bradycardia or tachy dysrhythmia.  She would also benefit from continuing to monitor her vital signs.    I discussed the case with the hospitalist who accepted the patient for admission.  Transitional orders were written.          Medical Decision Making  The patient's presentation was of high complexity (an acute health issue posing potential threat to life or bodily function).    The patient's evaluation involved:  review of external note(s) from 1 sources (see separate area of note for details)  review of 3+ test result(s) ordered prior to this encounter (see separate area of note for details)  ordering and/or review of 3+ test(s) in this encounter (see separate area of note for details)  independent interpretation of testing performed by another health professional (see  separate area of note for details)  discussion of management or test interpretation with another health professional (see separate area of note for details)    The patient's management necessitated high risk (a decision regarding hospitalization).        New Prescriptions    No medications on file       Final diagnoses:   Syncope, unspecified syncope type       2/11/2025   [facility] EMERGENCY DEPT       Ramón Lea MD  02/12/25 0918

## 2025-02-12 ENCOUNTER — HOSPITAL ENCOUNTER (OUTPATIENT)
Dept: CARDIOLOGY | Facility: CLINIC | Age: 85
Discharge: HOME | End: 2025-02-12
Attending: INTERNAL MEDICINE
Payer: MEDICARE

## 2025-02-12 VITALS
TEMPERATURE: 98.1 F | RESPIRATION RATE: 20 BRPM | WEIGHT: 132.06 LBS | HEART RATE: 45 BPM | SYSTOLIC BLOOD PRESSURE: 110 MMHG | OXYGEN SATURATION: 96 % | BODY MASS INDEX: 22.55 KG/M2 | DIASTOLIC BLOOD PRESSURE: 59 MMHG | HEIGHT: 64 IN

## 2025-02-12 DIAGNOSIS — R55 SYNCOPE, UNSPECIFIED SYNCOPE TYPE: ICD-10-CM

## 2025-02-12 PROBLEM — N18.31 CHRONIC KIDNEY DISEASE, STAGE 3A (H): Status: ACTIVE | Noted: 2024-04-12

## 2025-02-12 PROBLEM — Z72.0 TOBACCO ABUSE: Status: ACTIVE | Noted: 2021-05-20

## 2025-02-12 LAB
ALBUMIN SERPL BCG-MCNC: 3.5 G/DL (ref 3.5–5.2)
ALP SERPL-CCNC: 41 U/L (ref 40–150)
ALT SERPL W P-5'-P-CCNC: 12 U/L (ref 0–50)
ANION GAP SERPL CALCULATED.3IONS-SCNC: 13 MMOL/L (ref 7–15)
AST SERPL W P-5'-P-CCNC: 24 U/L (ref 0–45)
ATRIAL RATE - MUSE: 68 BPM
BASOPHILS # BLD AUTO: 0 10E3/UL (ref 0–0.2)
BASOPHILS NFR BLD AUTO: 0 %
BILIRUB SERPL-MCNC: 0.4 MG/DL
BUN SERPL-MCNC: 34.4 MG/DL (ref 8–23)
CALCIUM SERPL-MCNC: 9 MG/DL (ref 8.8–10.4)
CHLORIDE SERPL-SCNC: 108 MMOL/L (ref 98–107)
CREAT SERPL-MCNC: 1.31 MG/DL (ref 0.51–0.95)
DIASTOLIC BLOOD PRESSURE - MUSE: NORMAL MMHG
EGFRCR SERPLBLD CKD-EPI 2021: 40 ML/MIN/1.73M2
EOSINOPHIL # BLD AUTO: 0 10E3/UL (ref 0–0.7)
EOSINOPHIL NFR BLD AUTO: 0 %
ERYTHROCYTE [DISTWIDTH] IN BLOOD BY AUTOMATED COUNT: 14.4 % (ref 10–15)
GLUCOSE SERPL-MCNC: 170 MG/DL (ref 70–99)
HCO3 SERPL-SCNC: 21 MMOL/L (ref 22–29)
HCT VFR BLD AUTO: 35.5 % (ref 35–47)
HGB BLD-MCNC: 11.7 G/DL (ref 11.7–15.7)
IMM GRANULOCYTES # BLD: 0 10E3/UL
IMM GRANULOCYTES NFR BLD: 1 %
INTERPRETATION ECG - MUSE: NORMAL
LYMPHOCYTES # BLD AUTO: 0.6 10E3/UL (ref 0.8–5.3)
LYMPHOCYTES NFR BLD AUTO: 9 %
MCH RBC QN AUTO: 31.6 PG (ref 26.5–33)
MCHC RBC AUTO-ENTMCNC: 33 G/DL (ref 31.5–36.5)
MCV RBC AUTO: 96 FL (ref 78–100)
MONOCYTES # BLD AUTO: 0.1 10E3/UL (ref 0–1.3)
MONOCYTES NFR BLD AUTO: 1 %
NEUTROPHILS # BLD AUTO: 5.5 10E3/UL (ref 1.6–8.3)
NEUTROPHILS NFR BLD AUTO: 89 %
NRBC # BLD AUTO: 0 10E3/UL
NRBC BLD AUTO-RTO: 0 /100
P AXIS - MUSE: 50 DEGREES
PLATELET # BLD AUTO: 217 10E3/UL (ref 150–450)
POTASSIUM SERPL-SCNC: 4.8 MMOL/L (ref 3.4–5.3)
PR INTERVAL - MUSE: 182 MS
PROT SERPL-MCNC: 6.3 G/DL (ref 6.4–8.3)
QRS DURATION - MUSE: 78 MS
QT - MUSE: 428 MS
QTC - MUSE: 455 MS
R AXIS - MUSE: 30 DEGREES
RBC # BLD AUTO: 3.7 10E6/UL (ref 3.8–5.2)
SODIUM SERPL-SCNC: 142 MMOL/L (ref 135–145)
SYSTOLIC BLOOD PRESSURE - MUSE: NORMAL MMHG
T AXIS - MUSE: 74 DEGREES
VENTRICULAR RATE- MUSE: 68 BPM
WBC # BLD AUTO: 6.2 10E3/UL (ref 4–11)

## 2025-02-12 PROCEDURE — G0378 HOSPITAL OBSERVATION PER HR: HCPCS

## 2025-02-12 PROCEDURE — 258N000003 HC RX IP 258 OP 636

## 2025-02-12 PROCEDURE — 99239 HOSP IP/OBS DSCHRG MGMT >30: CPT | Performed by: INTERNAL MEDICINE

## 2025-02-12 PROCEDURE — 85041 AUTOMATED RBC COUNT: CPT

## 2025-02-12 PROCEDURE — 85004 AUTOMATED DIFF WBC COUNT: CPT

## 2025-02-12 PROCEDURE — 85014 HEMATOCRIT: CPT

## 2025-02-12 PROCEDURE — 36415 COLL VENOUS BLD VENIPUNCTURE: CPT

## 2025-02-12 PROCEDURE — 80053 COMPREHEN METABOLIC PANEL: CPT

## 2025-02-12 PROCEDURE — 250N000013 HC RX MED GY IP 250 OP 250 PS 637

## 2025-02-12 PROCEDURE — 93270 REMOTE 30 DAY ECG REV/REPORT: CPT

## 2025-02-12 RX ADMIN — SODIUM CHLORIDE 1000 ML: 9 INJECTION, SOLUTION INTRAVENOUS at 00:11

## 2025-02-12 RX ADMIN — LEVOTHYROXINE SODIUM 50 MCG: 0.05 TABLET ORAL at 02:53

## 2025-02-12 ASSESSMENT — ACTIVITIES OF DAILY LIVING (ADL)
ADLS_ACUITY_SCORE: 47

## 2025-02-12 NOTE — ED NOTES
"[facility]   Admission Handoff    The patient is Catherine Salgado, 84 year old who arrived in the ED by AMBULANCE from home with a complaint of Syncope (Syncope - syncopal and vomiting after leaving grocery store. EMS notes bradicardic episodes associated with symptoms, HR intermittently into 40's./)  . The patient's current symptoms are new and during this time the symptoms have decreased. In the ED, patient was diagnosed with   Final diagnoses:   Syncope, unspecified syncope type         Needed?: No    Allergies:    Allergies   Allergen Reactions    Wasps [Hornets]      Was stung in face by wasp and had significant facial swelling, Aug 2018    Bees     Codeine Other (See Comments)     Confusion, was given Tylenol # 3 after hysterectomy and developed mild confusion     Contrast Dye Other (See Comments)     Severe arthritic reaction    Gadavist [Gadobutrol] Other (See Comments)     Did ok if benadryl is given.     Pcn [Penicillins] Hives       Past Medical Hx:   Past Medical History:   Diagnosis Date    Bunion     Bilateral    Chest pain, unspecified     Generalized osteoarthrosis, unspecified site     Cervical    Macular degeneration (senile) of retina, unspecified     Other and unspecified hyperlipidemia     Phlebitis and thrombophlebitis of other deep vessels of lower extremities     DVT on OCP    TOBACCO ABUSE-CONTINUOUS     Unspecified essential hypertension        Initial vitals were: BP: 108/71  Pulse: 72  Temp: 97.9  F (36.6  C)  Resp: 20  Height: 160 cm (5' 3\")  Weight: 59.9 kg (132 lb)  SpO2: 97 %   Recent vital Signs: /67   Pulse 61   Temp 97.9  F (36.6  C) (Tympanic)   Resp 22   Ht 1.6 m (5' 3\")   Wt 59.9 kg (132 lb)   LMP  (LMP Unknown)   SpO2 97%   BMI 23.38 kg/m      Elimination Status: Continent: Yes     Activity Level: SBA    Fall Status: Reason for falls risk:  Mobility  bed/chair alarm on, nonskid shoes/slippers when out of bed, and arm band in " place    Baseline Mental status: WDL  Current Mental Status changes: at basesline    Infection present or suspected this encounter: no  Sepsis suspected: No    Isolation type:     Bariatric equipment needed?: No    In the ED these meds were given:   Medications   sodium chloride 0.9% BOLUS 500 mL (0 mLs Intravenous Stopped 2/11/25 1746)   sodium chloride 0.9% BOLUS 500 mL (0 mLs Intravenous Stopped 2/11/25 1846)   methylPREDNISolone Na Suc (solu-MEDROL) injection 125 mg (125 mg Intravenous $Given 2/11/25 1955)   diphenhydrAMINE (BENADRYL) injection 25 mg (25 mg Intravenous $Given 2/11/25 1955)   iopamidol (ISOVUE-370) solution 66 mL (66 mLs Intravenous $Given 2/11/25 2004)   sodium chloride 0.9 % bag 500 mL for CT scan flush use (94 mLs Intravenous $Given 2/11/25 2005)       Drips running?  No    Home pump  No    Current LDAs: Peripheral IV: Site L wrist; Gauge 20  none     Results:   Labs/Imaging  Ordered and Resulted from Time of ED Arrival Up to the Time of Departure from the ED  Results for orders placed or performed during the hospital encounter of 02/11/25 (from the past 24 hours)   [place] Draw    Narrative    The following orders were created for panel order [place] Draw.  Procedure                               Abnormality         Status                     ---------                               -----------         ------                     Extra Blue Top Tube[[id number]]                              Final result               Extra Red Top Tube[[id number]]                               Final result                 Please view results for these tests on the individual orders.   CBC with platelets, differential    Narrative    The following orders were created for panel order CBC with platelets, differential.  Procedure                               Abnormality         Status                     ---------                               -----------         ------                     CBC with platelets and  clinton..[[id number]]                      Final result                 Please view results for these tests on the individual orders.   Basic metabolic panel   Result Value Ref Range    Sodium 143 135 - 145 mmol/L    Potassium 4.5 3.4 - 5.3 mmol/L    Chloride 106 98 - 107 mmol/L    Carbon Dioxide (CO2) 26 22 - 29 mmol/L    Anion Gap 11 7 - 15 mmol/L    Urea Nitrogen 30.1 (H) 8.0 - 23.0 mg/dL    Creatinine 1.35 (H) 0.51 - 0.95 mg/dL    GFR Estimate 39 (L) >60 mL/min/1.73m2    Calcium 9.1 8.8 - 10.4 mg/dL    Glucose 132 (H) 70 - 99 mg/dL   Extra Blue Top Tube   Result Value Ref Range    Hold Specimen JIC    Extra Red Top Tube   Result Value Ref Range    Hold Specimen JIC    CBC with platelets and differential   Result Value Ref Range    WBC Count 8.6 4.0 - 11.0 10e3/uL    RBC Count 4.01 3.80 - 5.20 10e6/uL    Hemoglobin 12.6 11.7 - 15.7 g/dL    Hematocrit 38.7 35.0 - 47.0 %    MCV 97 78 - 100 fL    MCH 31.4 26.5 - 33.0 pg    MCHC 32.6 31.5 - 36.5 g/dL    RDW 14.4 10.0 - 15.0 %    Platelet Count 218 150 - 450 10e3/uL    % Neutrophils 70 %    % Lymphocytes 20 %    % Monocytes 7 %    % Eosinophils 2 %    % Basophils 1 %    % Immature Granulocytes 0 %    NRBCs per 100 WBC 0 <1 /100    Absolute Neutrophils 6.0 1.6 - 8.3 10e3/uL    Absolute Lymphocytes 1.7 0.8 - 5.3 10e3/uL    Absolute Monocytes 0.6 0.0 - 1.3 10e3/uL    Absolute Eosinophils 0.2 0.0 - 0.7 10e3/uL    Absolute Basophils 0.1 0.0 - 0.2 10e3/uL    Absolute Immature Granulocytes 0.0 <=0.4 10e3/uL    Absolute NRBCs 0.0 10e3/uL   Hepatic function panel   Result Value Ref Range    Protein Total 6.7 6.4 - 8.3 g/dL    Albumin 3.7 3.5 - 5.2 g/dL    Bilirubin Total 0.5 <=1.2 mg/dL    Alkaline Phosphatase 45 40 - 150 U/L    AST 26 0 - 45 U/L    ALT 13 0 - 50 U/L    Bilirubin Direct <0.20 0.00 - 0.30 mg/dL   Lipase   Result Value Ref Range    Lipase 38 13 - 60 U/L   Troponin T, High Sensitivity   Result Value Ref Range    Troponin T, High Sensitivity 12 <=14 ng/L   TSH with free  T4 reflex   Result Value Ref Range    TSH 8.08 (H) 0.30 - 4.20 uIU/mL   T4 free   Result Value Ref Range    Free T4 1.16 0.90 - 1.70 ng/dL   D dimer quantitative   Result Value Ref Range    D-Dimer Quantitative 1.19 (H) 0.00 - 0.50 ug/mL FEU    Narrative    This D-dimer assay is intended for use in conjunction with a clinical pretest probability assessment model to exclude pulmonary embolism (PE) and deep venous thrombosis (DVT) in outpatients suspected of PE or DVT. The cut-off value is 0.50 ug/mL FEU.    For patients 50 years of age or older, the application of age-adjusted cut-off values for D-Dimer may increase the specificity without significant effect on sensitivity. The literature suggested calculation age adjusted cut-off in ug/L = age in years x 10 ug/L. The results in this laboratory are reported as ug/mL rather than ug/L. The calculation for age adjusted cut off in ug/mL= age in years x 0.01 ug/mL. For example, the cut off for a 76 year old male is 76 x 0.01 ug/mL = 0.76 ug/mL (760 ug/L).    M Marekhinitza et al. Age adjusted D-dimer cut-off levels to rule out pulmonary embolism: The ADJUST-PE Study. GM 2014;311:9360-1987.; HJ Ashley et al. Diagnostic accuracy of conventional or age adjusted D-dimer cutoff values in older patients with suspected venous thromboembolism. Systemic review and meta-analysis. BMJ 2013:346:f2492.   EKG 12-lead, tracing only   Result Value Ref Range    Systolic Blood Pressure  mmHg    Diastolic Blood Pressure  mmHg    Ventricular Rate 68 BPM    Atrial Rate 68 BPM    NE Interval 182 ms    QRS Duration 78 ms     ms    QTc 455 ms    P Axis 50 degrees    R AXIS 30 degrees    T Axis 74 degrees    Interpretation ECG       Sinus rhythm with marked sinus arrhythmia  Septal infarct , age undetermined  Abnormal ECG  No previous ECGs available     Troponin T, High Sensitivity   Result Value Ref Range    Troponin T, High Sensitivity 13 <=14 ng/L   CT Chest Pulmonary Embolism w Contrast     Narrative    EXAM: CT CHEST PULMONARY EMBOLISM W CONTRAST  LOCATION: [facility]  DATE: 2/11/2025    INDICATION: syncope, elevated D dimer  COMPARISON: CTA chest 07/01/2017  TECHNIQUE: CT chest pulmonary angiogram during arterial phase injection of IV contrast. Multiplanar reformats and MIP reconstructions were performed. Dose reduction techniques were used.   CONTRAST: 66mL Isovue 370    FINDINGS:  ANGIOGRAM CHEST: Pulmonary arteries are normal caliber and negative for pulmonary emboli. Thoracic aorta is negative for dissection. No CT evidence of right heart strain.    LUNGS AND PLEURA: Moderate changes of centrilobular emphysema, predominantly involving the upper lobes. Dependent atelectasis posterior aspects of both lower lobes. No acute infiltrates or effusions. 4 mm pulmonary nodule right upper lobe along the   lateral pleural surface on image 159 is minimally changed. No further follow-up required. Calcified granuloma medial aspect of the right lower lobe.    MEDIASTINUM/AXILLAE: Atherosclerotic disease thoracic aorta. No adenopathy. Mild aneurysmal dilatation ascending thoracic aorta measuring 4.1 x 4.1 cm, unchanged.    CORONARY ARTERY CALCIFICATION: Moderate.    UPPER ABDOMEN: Atherosclerotic disease abdominal aorta. Numerous tiny stones at the corticomedullary junction of the left kidney. Right kidney not imaged. Gallstones. Hepatic and splenic granulomas.    MUSCULOSKELETAL: Osteopenia. Degenerative disc disease thoracic spine.      Impression    IMPRESSION:  1.  No evidence for pulmonary emboli.  2.  Dependent subsegmental atelectasis both lower lobes. No acute pulmonary disease.  3.  Emphysema.  4.  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  5.  Gallstones and left kidney stones.       For the majority of the shift this patient's behavior was Green     Cardiac Rhythm: Normal Sinus  Pt needs tele? Yes  Skin/wound Issues: None    Code Status: Full Code    Pain  control: good    Nausea control: good    Abnormal labs/tests/findings requiring intervention:     Patient tested for COVID 19 prior to admission: YES     OBS brochure/video discussed/provided to patient/family: Yes     Family present during ED course? Yes     Family Comments/Social Situation comments:     Tasks needing completion: None    Yelena Calloway RN

## 2025-02-12 NOTE — PLAN OF CARE
"[facility] ADMISSION NOTE    Patient admitted to room 2400 at approximately 23:00 via wheel chair from emergency room. Patient was accompanied by transport tech.     Verbal SBAR report received from Yelena Calloway RN prior to patient arrival.     Patient ambulated to bed with stand-by assist. Patient alert and oriented X 3. The patient is not having any pain. Admission vital signs: Blood pressure 113/56, pulse 52, temperature 97.7  F (36.5  C), temperature source Oral, resp. rate 18, height 1.626 m (5' 4\"), weight 59.9 kg (132 lb 0.9 oz), SpO2 95%. Patient was oriented to plan of care, call light, bed controls, tv, telephone, bathroom, and visiting hours.     Risk Assessment    The following safety risks were identified during admission: fall. Yellow risk band applied: YES.     Skin Initial Assessment    This writer admitted this patient and completed a full skin assessment and Artur score in the Adult PCS flowsheet.   Photo documentation of skin problem and/or wound competed via [employer] application (located under Media):  N/A    Appropriate interventions initiated as needed.     Secondary skin check completed by Yulia Swift RN.    Artur Risk Assessment  Sensory Perception: 4-->no impairment  Moisture: 3-->occasionally moist  Activity: 3-->walks occasionally  Mobility: 3-->slightly limited  Nutrition: 3-->adequate  Friction and Shear: 3-->no apparent problem  Artur Score: 19  Moisture Interventions: Encourage regular toileting, No brief in bed, Incontinence pad  Mattress: Standard gel/foam mattress (IsoFlex, Atmos Air, etc.)    Education    Patient has a [place] to Observation order: Yes  Observation education completed and documented: Yes      Anup Vásquez RN    Problem: Syncope  Goal: Absence of Syncopal Symptoms  Outcome: Progressing  Intervention: Manage Effect of Syncopal Symptoms  Recent Flowsheet Documentation  Taken 2/12/2025 0000 by Anup Vásquez RN  Syncope Management: position changed slowly  Safety " "Promotion/Fall Prevention:   lighting adjusted   nonskid shoes/slippers when out of bed   patient and family education   room near nurse's station   safety round/check completed   supervised activity  Supportive Measures:   active listening utilized   relaxation techniques promoted   self-care encouraged   verbalization of feelings encouraged     Problem: Adult Inpatient Plan of Care  Goal: Absence of Hospital-Acquired Illness or Injury  Intervention: Identify and Manage Fall Risk  Recent Flowsheet Documentation  Taken 2/12/2025 0000 by Anup Vásquez RN  Safety Promotion/Fall Prevention:   lighting adjusted   nonskid shoes/slippers when out of bed   patient and family education   room near nurse's station   safety round/check completed   supervised activity   Goal Outcome Evaluation:           Pt is A/O X3. Continues to be bradycardic, other vital signs stable on RA. Denies pain, N/V, SOB, dizziness/ lightheadedness, and changes in vision. IVF infusing at 100 mL/hr. Up with SBA. Will continue to monitor.      BP 91/51 (BP Location: Left arm)   Pulse 52   Temp 97.4  F (36.3  C) (Oral)   Resp 18   Ht 1.626 m (5' 4\")   Wt 59.9 kg (132 lb 0.9 oz)   LMP  (LMP Unknown)   SpO2 94%   BMI 22.67 kg/m          "

## 2025-02-12 NOTE — PROGRESS NOTES
Addendum: Noted on Tele on going bradycardia with rate of 40s at 2230.   Spoke to patient and asymptomatic and instructed to call with any sx, she agreed. AVIS Jose updated with order to continue to monitor per tele and updated of symptomatic or if HR <40.

## 2025-02-12 NOTE — MEDICATION SCRIBE - ADMISSION MEDICATION HISTORY
Medication Scribe Admission Medication History    Admission medication history is complete. The information provided in this note is only as accurate as the sources available at the time of the update.    Information Source(s): Patient and CareEverywhere/SureScripts via with patient in room and finished at desk.    Pertinent Information:   She states that she was told to take Aspirin 81 mg bid, so that is what she has been doing.  She has an epi pen at home for bee stings.  She is a night owl, so that is why Levothyroxine and Lisinopril are taken at 0300    Changes made to PTA medication list:  Added:   OTC Aspirin 81 mg bid(taking the plain pink swallow tab that is not an option in our system list of meds)  Visine  Vitamin E unknown strength    Deleted:   Vitamin D 125 mcg  Vitamin B Complex  Aspirin 81 mg EC daily    Changed: None    Allergies reviewed with patient and updates made in EHR: yes, no change.    Medication History Completed By: Tala Zhao 2/11/2025 7:08 PM    PTA Med List   Medication Sig Last Dose/Taking    EPINEPHrine (ANY BX GENERIC EQUIV) 0.3 MG/0.3ML injection 2-pack Inject 0.3 mLs (0.3 mg) into the muscle as needed for anaphylaxis Taking As Needed    levothyroxine (SYNTHROID/LEVOTHROID) 50 MCG tablet Take 1 tablet (50 mcg) by mouth daily. NEEDS LAB VISIT FOR FURTHER REFILLS 2/11/2025 at  3:00 AM    lisinopril (ZESTRIL) 20 MG tablet Take 1 tablet (20 mg) by mouth daily 2/11/2025 at  3:00 AM    OVER-THE-COUNTER Take 81 mg by mouth 2 times daily. Regular pink swallow tab 2/11/2025 Morning    tetrahydrozoline (VISINE) 0.05 % ophthalmic solution Place 1 drop into both eyes daily. 2/10/2025 Evening    VITAMIN E PO Take 1 capsule by mouth daily. 2/11/2025 Morning

## 2025-02-12 NOTE — PLAN OF CARE
Skin affirmation note    Admitting nurse completed full skin assessment, Artur score and Artur interventions. This writer agrees with the initial skin assessment findings.

## 2025-02-12 NOTE — PROGRESS NOTES
[facility] DISCHARGE NOTE    Patient discharged to home at 12:19 PM via wheel chair. Accompanied by spouse and daughter and staff. Discharge instructions reviewed with patient and daughter, opportunity offered to ask questions. Prescriptions - None ordered for discharge. All belongings sent with patient.    Jose Connor RN

## 2025-02-12 NOTE — H&P
[facility]    History and Physical  Hospital Medicine       Date of Admission:  2/11/2025  Date of Service: 2/11/2025     Assessment & Plan   Catherine Salgado is a 84 year old female with past medical history significant for SVT with near syncope, mild sinus bradycardia, atrial tachycardia, hypothyroidism, CKD stage 3a, hx of CVA. Admitted 02/11/25 after near syncopal event with bradycardia and vomiting.      Syncope  Bradycardia   Syncopal event ~1600 on day of admission with associated shortness of breath, diaphoresis, vomiting x2. EMS noted HR ~40bpm and administered atropine. HR stable around 60-70 while in the ED. No obvious triggers. Patient was feeling well, at baseline prior to this event, but had not eaten since 0900.    Had similar presentation 07/13/2024, ED workup unremarkable at that time. Ziopatch 08/2024 with no profound bradycardia or pauses, slowest HR in 40s. A few brief (seconds long) runs of atrial tachycardia. Echocardiogram 08/2024 with EF 60-65%. Previous episode ultimately thought to be vasovagal syncope.   Patient reports longstanding history of episodic diaphoresis and facial flushing which typically resolves if she lies down.   EKG with sinus arrhythmia rate 68, no acute ST-T segment abnormalities. CBC unremarkable. Elevated D dimer (1.19), CT PE study negative for acute abnormalities. Troponin 12 ? 13.  VS stable, reassuring on admission exam with HR ~50bpm.   - Telemetry   - IVF: NS @ 100mL/hr   - AM BMP, CBC     Hypothyroidism   Started treatment in 2013. Recently noted to have elevated TSH (15) after being off levothyroxine, this was restarted in 10/2024.  Admission TSH 8.08. Of note, had levothyroxine dose increase (100mcg) in 05/2024, 2 months prior to previous syncopal event.  Managed prior to admission with levothyroxine 50 mcg daily.   - Continue pta levothyroxine for now     Hypertension  Borderline hypotension in the ED, stable on admission. Managed  prior to admission with lisinopril 20mg daily   - Hold pta lisinopril given soft BPs    Chronic kidney disease stage 3a   Follows with nephrology most recent visit 09/09/2024. Baseline creatinine 1-1.3. Admit creatinine 1.35, GFR 39. Stable.     Hx of CVA   10/2015.         Clinically Significant Risk Factors Present on Admission                   # Hypertension: Noted on problem list                           Diet: Regular Diet Adult    DVT Prophylaxis: Pneumatic Compression Devices  Corona Catheter: Not present  Code Status: Full Code      Disposition Plan   Medically Ready for Discharge: Anticipated Tomorrow         The patient's care was discussed with the Patient.  I will be discussing patient and formulating plan with attending hospitalist physician, Dr. Fabio Gonzalez PA-C        Primary Care Physician   Natalie Zayas [phone]    History is obtained from the patient,  and review of old records via the EMR.    History of Present Illness   Catherine Salgado is a 84 year old female with past medical history significant for SVT with near syncope, mild sinus bradycardia, atrial tachycardia, hypothyroidism, CKD stage 3a, hx of CVA. Admitted 02/11/25 after near syncopal event with bradycardia and vomiting.      On day of admission ~1600 patient was riding in the car with her  when she had sudden onset facial flushing, diaphoresis, nausea, vomiting x2, and then lost consciousness for approximately 30 seconds. Her  reports that she seemed to have altered breathing patterns while unconsciousness. No obvious triggering events.    Patient was feeling well, at her baseline on day of admission prior to syncopal event. Denies recent illness.   The emesis was non-bloody. No recurrence of nausea or vomiting.   She ate breakfast ~900 on day of admission, did not eat anything throughout the day after this.     She reports longstanding history (several years) of mild episodic diaphoresis and  facial flushing. Reports that typically when these episodes occur she will lie down and symptoms resolve within minutes.     She had similar episode of syncope in 07/2024. ED workup and subsequent cardiac evaluation at that time was non-revealing, ultimately thought to be vasovagal reaction.     She lives in a private residence with her . Endorses good medication compliance. Endorses occasional alcohol use, no recent use. Smokes 0.25-0.5 ppd. No illicit drug use.     Review of Systems   The 10 point Review of Systems is negative other than noted in the HPI or here.     Past Medical History    Past Medical History:   Diagnosis Date    Bunion     Bilateral    Chest pain, unspecified     Generalized osteoarthrosis, unspecified site     Cervical    Macular degeneration (senile) of retina, unspecified     Other and unspecified hyperlipidemia     Phlebitis and thrombophlebitis of other deep vessels of lower extremities     DVT on OCP    TOBACCO ABUSE-CONTINUOUS     Unspecified essential hypertension        Past Surgical History   Past Surgical History:   Procedure Laterality Date    COLONOSCOPY  5/27/2005    Diverticulosis    SURGICAL HISTORY OF -       T&A    SURGICAL HISTORY OF -   10/1976    Vaginal hysterectomy, A & P repair        Prior to Admission Medications   Prior to Admission Medications   Prescriptions Last Dose Informant Patient Reported? Taking?   EPINEPHrine (ANY BX GENERIC EQUIV) 0.3 MG/0.3ML injection 2-pack  Self No Yes   Sig: Inject 0.3 mLs (0.3 mg) into the muscle as needed for anaphylaxis   OVER-THE-COUNTER 2/11/2025 Morning Self Yes Yes   Sig: Take 81 mg by mouth 2 times daily. Regular pink swallow tab   VITAMIN E PO 2/11/2025 Morning Self Yes Yes   Sig: Take 1 capsule by mouth daily.   levothyroxine (SYNTHROID/LEVOTHROID) 50 MCG tablet 2/11/2025 at  3:00 AM Self No Yes   Sig: Take 1 tablet (50 mcg) by mouth daily. NEEDS LAB VISIT FOR FURTHER REFILLS   lisinopril (ZESTRIL) 20 MG tablet  2/11/2025 at  3:00 AM Self No Yes   Sig: Take 1 tablet (20 mg) by mouth daily   tetrahydrozoline (VISINE) 0.05 % ophthalmic solution 2/10/2025 Evening Self Yes Yes   Sig: Place 1 drop into both eyes daily.      Facility-Administered Medications: None     Allergies   Allergies   Allergen Reactions    Wasps [Hornets]      Was stung in face by wasp and had significant facial swelling, Aug 2018    Bees     Codeine Other (See Comments)     Confusion, was given Tylenol # 3 after hysterectomy and developed mild confusion     Contrast Dye Other (See Comments)     Severe arthritic reaction    Gadavist [Gadobutrol] Other (See Comments)     Did ok if benadryl is given.     Pcn [Penicillins] Hives       Family History    Family History   Problem Relation Age of Onset    Cerebrovascular Disease Mother         sebral hemmerage    C.A.D. Father     Cancer Father         kidney CA    Cardiovascular Sister         cardiac arrest    Musculoskeletal Disorder Daughter         rotator cuff, knee repair    Diabetes Sister     Musculoskeletal Disorder Daughter         ankle and arm injuries    Musculoskeletal Disorder Daughter         Knees    Hypertension Sister        Social History   Social History     Socioeconomic History    Marital status:      Spouse name: Not on file    Number of children: Not on file    Years of education: Not on file    Highest education level: Not on file   Occupational History    Not on file   Tobacco Use    Smoking status: Every Day     Current packs/day: 0.50     Average packs/day: 0.5 packs/day for 50.0 years (25.0 ttl pk-yrs)     Types: Cigarettes    Smokeless tobacco: Never    Tobacco comments:     Under a half a pack a day about    Vaping Use    Vaping status: Never Used   Substance and Sexual Activity    Alcohol use: Not Currently     Comment: rare    Drug use: No    Sexual activity: Yes     Partners: Male     Birth control/protection: Surgical   Other Topics Concern    Parent/sibling w/ CABG, MI  "or angioplasty before 65F 55M? Yes     Comment: father   Social History Narrative    Not on file     Social Drivers of Health     Financial Resource Strain: Low Risk  (2/12/2025)    Financial Resource Strain     Within the past 12 months, have you or your family members you live with been unable to get utilities (heat, electricity) when it was really needed?: No   Food Insecurity: Not on file   Transportation Needs: Not on file   Physical Activity: Not on file   Stress: Not on file   Social Connections: Not on file   Interpersonal Safety: Low Risk  (10/25/2024)    Interpersonal Safety     Do you feel physically and emotionally safe where you currently live?: Yes     Within the past 12 months, have you been hit, slapped, kicked or otherwise physically hurt by someone?: No     Within the past 12 months, have you been humiliated or emotionally abused in other ways by your partner or ex-partner?: No   Housing Stability: Not on file       Physical Exam   /56 (BP Location: Left arm, Patient Position: Supine, Cuff Size: Adult Small)   Pulse 52   Temp 97.7  F (36.5  C) (Oral)   Resp 18   Ht 1.626 m (5' 4\")   Wt 59.9 kg (132 lb 0.9 oz)   LMP  (LMP Unknown)   SpO2 95%   BMI 22.67 kg/m       Weight: 132 lbs .89 oz Body mass index is 22.67 kg/m .     Constitutional: Elderly adult lying in bed. Pleasant, responding to questions appropriately, speaking in full sentences without difficulty. Alert and oriented, appears comfortable, nontoxic, no acute distress.  Eyes: Eyes are clear, no scleral icterus, pupils are reactive, EOM intact bilaterally.  HENT: Oropharynx is clear and moist. No evidence of cranial trauma.   Cardiovascular: Regular rate and rhythm and no murmur, rub, or gallops noted. Radial & dorsalis pedis pulses 2+ bilaterally. No lower extremity edema.  Respiratory: Respirations unlabored on room air, Clear to auscultation bilaterally without wheezes, crackles, rhonchi.   GI: Active bowel sounds " throughout. Soft, not distended, non-tender to palpation, without rebound or guarding. No palpable masses, no hepatomegaly.   Genitourinary: Deferred  Musculoskeletal: Normal muscle bulk and tone. Moves all extremities spontaneously. No gross deformity. 5/5 strength in bilateral upper extremities.   Skin: Warm and dry, no rashes on exposed skin.   Neurologic: Neck supple.  is symmetric. No notable tremor. Speech is clear and fluent.       Data   Data reviewed today:     I have personally reviewed the following data over the past 24 hrs:    8.6  \   12.6   / 218     143 106 30.1 (H) /  132 (H)   4.5 26 1.35 (H) \     ALT: 13 AST: 26 AP: 45 TBILI: 0.5   ALB: 3.7 TOT PROTEIN: 6.7 LIPASE: 38     Trop: 13 BNP: N/A     TSH: 8.08 (H) T4: 1.16 A1C: N/A     INR:  N/A PTT:  N/A   D-dimer:  1.19 (H) Fibrinogen:  N/A         Recent Results (from the past 24 hours)   CT Chest Pulmonary Embolism w Contrast    Narrative    EXAM: CT CHEST PULMONARY EMBOLISM W CONTRAST  LOCATION: [facility]  DATE: 2/11/2025    INDICATION: syncope, elevated D dimer  COMPARISON: CTA chest 07/01/2017  TECHNIQUE: CT chest pulmonary angiogram during arterial phase injection of IV contrast. Multiplanar reformats and MIP reconstructions were performed. Dose reduction techniques were used.   CONTRAST: 66mL Isovue 370    FINDINGS:  ANGIOGRAM CHEST: Pulmonary arteries are normal caliber and negative for pulmonary emboli. Thoracic aorta is negative for dissection. No CT evidence of right heart strain.    LUNGS AND PLEURA: Moderate changes of centrilobular emphysema, predominantly involving the upper lobes. Dependent atelectasis posterior aspects of both lower lobes. No acute infiltrates or effusions. 4 mm pulmonary nodule right upper lobe along the   lateral pleural surface on image 159 is minimally changed. No further follow-up required. Calcified granuloma medial aspect of the right lower lobe.    MEDIASTINUM/AXILLAE:  Atherosclerotic disease thoracic aorta. No adenopathy. Mild aneurysmal dilatation ascending thoracic aorta measuring 4.1 x 4.1 cm, unchanged.    CORONARY ARTERY CALCIFICATION: Moderate.    UPPER ABDOMEN: Atherosclerotic disease abdominal aorta. Numerous tiny stones at the corticomedullary junction of the left kidney. Right kidney not imaged. Gallstones. Hepatic and splenic granulomas.    MUSCULOSKELETAL: Osteopenia. Degenerative disc disease thoracic spine.      Impression    IMPRESSION:  1.  No evidence for pulmonary emboli.  2.  Dependent subsegmental atelectasis both lower lobes. No acute pulmonary disease.  3.  Emphysema.  4.  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  5.  Gallstones and left kidney stones.       I personally reviewed no images or EKG's today

## 2025-02-12 NOTE — DISCHARGE SUMMARY
[facility]  Hospitalist Discharge Summary       Date of Admission:  2/11/2025  Date of Discharge:  February 12, 2025  Discharging Provider: Fabio Malone MD      Discharge Diagnoses     Syncope  Bradycardia   Hypothyroidism   Hypertension  Chronic kidney disease stage 3a   Hx of CVA   Hyperchloremia  Hypertension    Follow-ups Needed After Discharge   Follow-up Appointments       Follow-up and recommended labs and tests       Follow up with primary care provider, Natalie Durant, within 7 days for hospital follow- up.  The following labs/tests are recommended: CBC and CMP.              Discharge Disposition   Discharged to home    Hospital Course   Catherine Salgado is a 84 year old female with past medical history significant for SVT with near syncope, mild sinus bradycardia, atrial tachycardia, hypothyroidism, CKD stage 3a, hx of CVA. Admitted 02/11/25 after near syncopal event with bradycardia and vomiting.       Syncope  Bradycardia   Syncopal event ~1600 on day of admission with associated shortness of breath, diaphoresis, vomiting x2. EMS noted HR ~40bpm and administered atropine. HR stable around 60-70 while in the ED. No obvious triggers. Patient was feeling well, at baseline prior to this event, but had not eaten since 0900.    Had similar presentation 07/13/2024, ED workup unremarkable at that time. Ziopatch 08/2024 with no profound bradycardia or pauses, slowest HR in 40s. A few brief (seconds long) runs of atrial tachycardia. Echocardiogram 08/2024 with EF 60-65%. Previous episode ultimately thought to be vasovagal syncope.   Patient reports longstanding history of episodic diaphoresis and facial flushing which typically resolves if she lies down.   EKG with sinus arrhythmia rate 68, no acute ST-T segment abnormalities. CBC unremarkable. Elevated D dimer (1.19), CT PE study negative for acute abnormalities. Troponin 12 ? 13.  VS stable, reassuring on admission exam with  HR ~50bpm.   - Telemetry demonstrated a heart rate down to the 40s while patient slept, but no significant arrhythmias or pauses  - AM BMP, CBC unremarkable  - Patient will discharge home with event monitor and repeat referral back to cardiology     Hypothyroidism   Started treatment in 2013. Recently noted to have elevated TSH (15) after being off levothyroxine, this was restarted in 10/2024.  Admission TSH 8.08. Of note, had levothyroxine dose increase (100mcg) in 05/2024, 2 months prior to previous syncopal event.  Managed prior to admission with levothyroxine 50 mcg daily.   - Continue pta levothyroxine for now      Hypertension  Borderline hypotension in the ED, stable on admission. Managed prior to admission with lisinopril 20mg daily   - Hold pta lisinopril given soft BPs, resume at discharge and IV fluids overnight     Chronic kidney disease stage 3a   Follows with nephrology most recent visit 09/09/2024. Baseline creatinine 1-1.3. Admit creatinine 1.35, GFR 39. Stable.      Hx of CVA   10/2015.       Clinically Significant Risk Factors Present on Admission       # Hyperchloremia: Highest Cl = 108 mmol/L in last 2 days, will monitor as appropriate              # Hypertension: Noted on problem list                    Consultations This Hospital Stay Code Status Full Code    35 MINUTES SPENT BY ME on the date of service doing chart review, history, exam, documentation & further activities per the note.     Fabio Malone MD  [facility]  ______________________________________________________________________    Physical Exam   Vital Signs: Temp: 98.1  F (36.7  C) Temp src: Oral BP: 110/59 Pulse: (!) 45   Resp: 20 SpO2: 96 % O2 Device: None (Room air) Oxygen Delivery: 4 LPM  Weight: 132 lbs .89 oz       Gen: Thin kyphotic elderly female, alert and oriented x 3, no acute distressed  HEENT: Atraumatic, normocephalic; sclera non-injected, anicterric; oral mucosa moist, no lesion, no  exudate  Lungs: Clear to ausculation, no wheezes, no rhonchi, no rales  Heart: Regular rate, regular rhythm, no gallops, no rubs, no murmurs  GI: Bowel sound normal, no hepatosplenomegaly, no masses, non-tender, non-distended, no guarding, no rebound tenderness  Lymph: No lymphadenopathy, no edema  Skin: No rashes, no chronic venous stasis     Primary Care Physician   Natalie Durant    Discharge Orders      Adult Cardiology al [facility] Referral      Reason for your hospital stay    This is an 84 year old female admitted following a syncopal event.     Follow-up and recommended labs and tests     Follow up with primary care provider, Natalie Durant, within 7 days for hospital follow- up.  The following labs/tests are recommended: CBC and CMP.     Activity    Your activity upon discharge: activity as tolerated     Full Code     Adult Cardiac Event Monitor     Diet    Follow this diet upon discharge: Orders Placed This Encounter      Regular Diet Adult         Significant Results and Procedures     Discharge Medications   Current Discharge Medication List        CONTINUE these medications which have NOT CHANGED    Details   EPINEPHrine (ANY BX GENERIC EQUIV) 0.3 MG/0.3ML injection 2-pack Inject 0.3 mLs (0.3 mg) into the muscle as needed for anaphylaxis  Qty: 2 each, Refills: 0    Associated Diagnoses: Bee sting reaction, accidental or unintentional, initial encounter      levothyroxine (SYNTHROID/LEVOTHROID) 50 MCG tablet Take 1 tablet (50 mcg) by mouth daily. NEEDS LAB VISIT FOR FURTHER REFILLS  Qty: 90 tablet, Refills: 0    Associated Diagnoses: Hypothyroidism, unspecified type      lisinopril (ZESTRIL) 20 MG tablet Take 1 tablet (20 mg) by mouth daily  Qty: 90 tablet, Refills: 3    Associated Diagnoses: Benign essential hypertension      OVER-THE-COUNTER Take 81 mg by mouth 2 times daily. Regular pink swallow tab      tetrahydrozoline (VISINE) 0.05 % ophthalmic solution Place 1 drop into both eyes daily.       VITAMIN E PO Take 1 capsule by mouth daily.           Allergies   Allergies   Allergen Reactions    Wasps [Hornets]      Was stung in face by wasp and had significant facial swelling, Aug 2018    Bees     Codeine Other (See Comments)     Confusion, was given Tylenol # 3 after hysterectomy and developed mild confusion     Contrast Dye Other (See Comments)     Severe arthritic reaction    Gadavist [Gadobutrol] Other (See Comments)     Did ok if benadryl is given.     Pcn [Penicillins] Hives

## 2025-03-10 ENCOUNTER — LAB (OUTPATIENT)
Dept: LAB | Facility: CLINIC | Age: 85
End: 2025-03-10
Payer: COMMERCIAL

## 2025-03-10 ENCOUNTER — OFFICE VISIT (OUTPATIENT)
Dept: FAMILY MEDICINE | Facility: CLINIC | Age: 85
End: 2025-03-10
Payer: COMMERCIAL

## 2025-03-10 VITALS
HEIGHT: 64 IN | RESPIRATION RATE: 20 BRPM | BODY MASS INDEX: 23.05 KG/M2 | SYSTOLIC BLOOD PRESSURE: 122 MMHG | TEMPERATURE: 96.9 F | OXYGEN SATURATION: 98 % | WEIGHT: 135 LBS | HEART RATE: 67 BPM | DIASTOLIC BLOOD PRESSURE: 68 MMHG

## 2025-03-10 DIAGNOSIS — R55 SYNCOPE, UNSPECIFIED SYNCOPE TYPE: ICD-10-CM

## 2025-03-10 DIAGNOSIS — E03.9 HYPOTHYROIDISM, UNSPECIFIED TYPE: ICD-10-CM

## 2025-03-10 DIAGNOSIS — R23.2 FLUSHING: Primary | ICD-10-CM

## 2025-03-10 DIAGNOSIS — R00.1 BRADYCARDIA: ICD-10-CM

## 2025-03-10 DIAGNOSIS — R23.2 FLUSHING: ICD-10-CM

## 2025-03-10 LAB
ANION GAP SERPL CALCULATED.3IONS-SCNC: 8 MMOL/L (ref 7–15)
BUN SERPL-MCNC: 34.6 MG/DL (ref 8–23)
CALCIUM SERPL-MCNC: 9.8 MG/DL (ref 8.8–10.4)
CHLORIDE SERPL-SCNC: 106 MMOL/L (ref 98–107)
CREAT SERPL-MCNC: 1.28 MG/DL (ref 0.51–0.95)
EGFRCR SERPLBLD CKD-EPI 2021: 41 ML/MIN/1.73M2
ERYTHROCYTE [DISTWIDTH] IN BLOOD BY AUTOMATED COUNT: 14.3 % (ref 10–15)
GLUCOSE SERPL-MCNC: 86 MG/DL (ref 70–99)
HCO3 SERPL-SCNC: 26 MMOL/L (ref 22–29)
HCT VFR BLD AUTO: 40.6 % (ref 35–47)
HGB BLD-MCNC: 13.1 G/DL (ref 11.7–15.7)
MCH RBC QN AUTO: 31.6 PG (ref 26.5–33)
MCHC RBC AUTO-ENTMCNC: 32.3 G/DL (ref 31.5–36.5)
MCV RBC AUTO: 98 FL (ref 78–100)
PLATELET # BLD AUTO: 212 10E3/UL (ref 150–450)
POTASSIUM SERPL-SCNC: 5.1 MMOL/L (ref 3.4–5.3)
RBC # BLD AUTO: 4.14 10E6/UL (ref 3.8–5.2)
SODIUM SERPL-SCNC: 140 MMOL/L (ref 135–145)
T4 FREE SERPL-MCNC: 1.21 NG/DL (ref 0.9–1.7)
TSH SERPL DL<=0.005 MIU/L-ACNC: 6.29 UIU/ML (ref 0.3–4.2)
WBC # BLD AUTO: 6.6 10E3/UL (ref 4–11)

## 2025-03-10 PROCEDURE — 36415 COLL VENOUS BLD VENIPUNCTURE: CPT

## 2025-03-10 PROCEDURE — 80048 BASIC METABOLIC PNL TOTAL CA: CPT

## 2025-03-10 PROCEDURE — 84443 ASSAY THYROID STIM HORMONE: CPT

## 2025-03-10 PROCEDURE — 84439 ASSAY OF FREE THYROXINE: CPT

## 2025-03-10 PROCEDURE — 85027 COMPLETE CBC AUTOMATED: CPT

## 2025-03-10 ASSESSMENT — PAIN SCALES - GENERAL: PAINLEVEL_OUTOF10: NO PAIN (0)

## 2025-03-10 NOTE — PATIENT INSTRUCTIONS
Send your heart monitor back tomorrow     Schedule with Cardiology within the next couple of weeks     Continue to keep well hydrated     Will get repeat labs today and update you on labs     Continue to monitor symptoms ~ check your blood pressure when you have the flushing episode     Pay attention to the last time you ate when you have one of these episodes

## 2025-03-10 NOTE — PROGRESS NOTES
"  {PROVIDER CHARTING PREFERENCE:291791}    Chelsey Alexander is a 85 year old, presenting for the following health issues:  Thyroid Disease and Hospital F/U        3/10/2025    10:53 AM   Additional Questions   Roomed by Aviva KRISHNAN CMA     History of Present Illness       Hypothyroidism:     Since last visit, patient describes the following symptoms::  None         -States she gets these spells where she feels really hot, starts sweating on her forehead, and then feels like she might pass out.     Hospital Follow-up Visit:    Hospital/Nursing Home/IP Rehab Facility: [facility]  Date of Admission: 02/11/2025  Date of Discharge: 02/12/2025  Reason(s) for Admission: Dehydration, syncope, bradycardia  Was the patient in the ICU or did the patient experience delirium during hospitalization?  No  Do you have any other stressors you would like to discuss with your provider? No    Problems taking medications regularly:  None  Medication changes since discharge: None  Problems adhering to non-medication therapy:  None    Summary of hospitalization:  {:844852}  Diagnostic Tests/Treatments reviewed.  Follow up needed: {:137406:}  Other Healthcare Providers Involved in Patient s Care:         {those currently involved after discharge:400602::\"None\"}  Update since discharge: {:714384} {TIP  Include information from family/caregivers, SNF, Care Coordination :743270}        Plan of care communicated with {:742405}     Gets a flushing over the forehead and sweaty and then feels like is going to pass out   Has had episodes several since discharge of flushness, but no near fainting or vomiting  Currently at the end of her 30 day heart monitor  Has been staying well hydrated       {Reference  Coding guidelines- Moderate Complexity F2F/Video within 7 - 14 days of discharge 7999926, High Complexity F2F/Video within 7 days [id number] or [id number] days [id number] :360126}            Review of " "Systems  Constitutional, HEENT, cardiovascular, pulmonary, gi and gu systems are negative, except as otherwise noted.      Objective    /68 (BP Location: Right arm, Patient Position: Sitting, Cuff Size: Adult Regular)   Pulse 67   Temp 96.9  F (36.1  C) (Tympanic)   Resp 20   Ht 1.626 m (5' 4\")   Wt 61.2 kg (135 lb)   LMP  (LMP Unknown)   SpO2 98%   BMI 23.17 kg/m    Body mass index is 23.17 kg/m .  Physical Exam   {Exam List (Optional):689909}    {Diagnostic Test Results (Optional):706930}        Signed Electronically by: Natalie Durant DNP  {Email feedback regarding this note to [email or web address]   :684924}  "

## 2025-04-13 NOTE — PROGRESS NOTES
Reason for Consultation: Syncope, bradycardia.      History of Presenting Illness: This is an 85-year-old who has been seen within the [facility] system most recently in August 2024.  She had presented with a syncopal event that appeared to be vasovagal and that it occurred while standing and was accompanied by a prodrome of diaphoresis.  No associated cardiac symptoms were present.  This is in the context of a history of vasovagal syncope as a young adult.    She underwent an EKG which demonstrated sinus bradycardia with borderline first-degree AV block.  An echocardiogram was essentially normal.  A Zio patch monitor demonstrated no significant pauses with a few short runs of atrial tachycardia.    She was admitted to [facility] from 2/11/2025 through 2/12/2025 with a near syncopal event associated with bradycardia and vomiting.  Her heart rate was noted to be in the 40s at the time she was seen by EMS and she received atropine with improvement in her heart rate.    It appears that she was feeling well the day of admission but then started experiencing sudden onset flushing, diaphoresis and nausea and vomiting while driving home.  This was then followed by shortness of breath and loss of consciousness.    Her admission evaluation was unremarkable with no significant pauses on telemetry.  She was found to have evidence of persistent hypothyroidism despite supplementation.  Free T4 was normal however.  She has undergone recent adjustments in her levothyroxine dose to address this.    She presented today accompanied by her .  He feels that she was dehydrated at the day of her syncopal event back in February.  She is not very active but does share household duties with her  and denies any exertional chest discomfort, dyspnea, PND or orthopnea.  She has had occasional recurrences of her flushing and diaphoresis along with palpitations, several episodes of which were documented at  the time of event monitoring.    She does have a remote history of a CVA.  It does not appear that the etiology was identified at the time.      PMH, FH, SH, Allergies and Meds: As outlined below.    Physical Exam:    Gen: NAD  Respiratory : Clear to Auscultation.  Cardiovascular: RRR, no murmurs  Extremities: No edema.    EKG: EKG on 2/11/2025 was independently reviewed and interpreted and demonstrated sinus rhythm with no acute ST-T wave abnormalities.    Labs: Lipid panel on 8/9/2024 demonstrated total cholesterol 225, HDL 57, LDL of 140 and triglycerides of 138.    Other Cardiac Testing:    She reported dizziness on multiple occasions as well as palpitations during recent event monitoring.  All tracings demonstrated sinus rhythm with sinus bradycardia and PACs.    Her CTA of the chest dated 2/11/2025 was also reviewed.  It demonstrated mild to moderate coronary artery calcification with mild dilatation of the ascending thoracic aorta.      IMPRESSION:    Syncope, most likely vasovagal.   2.  Short runs of atrial tachycardia noted on previous Zio patch monitoring.  Her most recent Zio patch monitor demonstrated sinus rhythm with PACs.  3.  Episodic flushing and diaphoresis.  She does have a history of hypothyroidism with persistently elevated thyroid-stimulating hormone.  4.  History of CVA in the past.  5.  Active tobacco use.          PLAN:    Ms. Salgado presents with a history of probable vasovagal syncope in the context of nausea and vomiting in February of this year.  She was noted to have sinus bradycardia at the time of her initial evaluation which would be consistent with vasovagal syncope.        Her previous cardiac workup has been unremarkable and that she has normal biventricular systolic function without evidence of significant valvular disease and event monitoring on 2 occasions has demonstrated no evidence of significant arrhythmias that could contribute to her symptoms.  Cardiac troponins at  the time of her syncopal event in 2024 were within normal limits.    She does have a substantial risk factor profile for significant underlying coronary disease and I think that a stress echocardiogram would be appropriate to rule this out.  I also think that an extended period of cardiac monitoring would be reasonable given her ongoing symptoms, I have recommended a loop recorder implant to this effect.    She did bring a record of her blood pressure readings and they have been generally on the lower side with systolics less than 120 mmHg on a reduced dose of lisinopril.  Given the hypotension could exacerbate potential vasovagal syncope I have changed her prescription of lisinopril to 10 mg daily.    It was a pleasure seeing her today.  We will arrange follow-up in approximately 3 months in our clinic at which point we will review the results of her stress echocardiogram as well as any loop recorder tracings.      The longitudinal plan of care for the diagnosis(es)/condition(s) as documented were addressed during this visit. Due to the added complexity in care, I will continue to support Catherine in the subsequent management and with ongoing continuity of care.    Over 60 minutes today spent pre and post charting, reviewing pertinent previous records as well as imaging studies personally and discussing and coordinating further evaluation for the patient's symptoms.        Gutierrez Smith M.D.       CURRENT MEDICATIONS:  Current Outpatient Medications   Medication Sig Dispense Refill    EPINEPHrine (ANY BX GENERIC EQUIV) 0.3 MG/0.3ML injection 2-pack Inject 0.3 mLs (0.3 mg) into the muscle as needed for anaphylaxis 2 each 0    levothyroxine (SYNTHROID/LEVOTHROID) 75 MCG tablet Take 1 tablet (75 mcg) by mouth daily. NEEDS LAB VISIT FOR FURTHER REFILLS 90 tablet 0    lisinopril (ZESTRIL) 20 MG tablet Take 1 tablet (20 mg) by mouth daily 90 tablet 3    OVER-THE-COUNTER Take 81 mg by mouth 2 times daily. Regular pink swallow  tab      tetrahydrozoline (VISINE) 0.05 % ophthalmic solution Place 1 drop into both eyes daily.      VITAMIN E PO Take 1 capsule by mouth daily.         ALLERGIES     Allergies   Allergen Reactions    Wasps [Hornets]      Was stung in face by wasp and had significant facial swelling, Aug 2018    Bees     Codeine Other (See Comments)     Confusion, was given Tylenol # 3 after hysterectomy and developed mild confusion     Contrast Dye Other (See Comments)     Severe arthritic reaction    Gadavist [Gadobutrol] Other (See Comments)     Did ok if benadryl is given.     Pcn [Penicillins] Hives       PAST MEDICAL HISTORY:  Past Medical History:   Diagnosis Date    Bunion     Bilateral    Chest pain, unspecified     Generalized osteoarthrosis, unspecified site     Cervical    Macular degeneration (senile) of retina, unspecified     Other and unspecified hyperlipidemia     Phlebitis and thrombophlebitis of other deep vessels of lower extremities     DVT on OCP    TOBACCO ABUSE-CONTINUOUS     Unspecified essential hypertension        PAST SURGICAL HISTORY:  Past Surgical History:   Procedure Laterality Date    COLONOSCOPY  5/27/2005    Diverticulosis    SURGICAL HISTORY OF -       T&A    SURGICAL HISTORY OF -   10/1976    Vaginal hysterectomy, A & P repair       FAMILY HISTORY:  Family History   Problem Relation Age of Onset    Cerebrovascular Disease Mother         bianca grant    C.A.D. Father     Cancer Father         kidney CA    Cardiovascular Sister         cardiac arrest    Musculoskeletal Disorder Daughter         rotator cuff, knee repair    Diabetes Sister     Musculoskeletal Disorder Daughter         ankle and arm injuries    Musculoskeletal Disorder Daughter         Knees    Hypertension Sister        SOCIAL HISTORY:  Social History     Socioeconomic History    Marital status:    Tobacco Use    Smoking status: Every Day     Current packs/day: 0.50     Average packs/day: 0.5 packs/day for 50.0 years  (25.0 ttl pk-yrs)     Types: Cigarettes    Smokeless tobacco: Never    Tobacco comments:     Under a half a pack a day about    Vaping Use    Vaping status: Never Used   Substance and Sexual Activity    Alcohol use: Not Currently     Comment: rare    Drug use: No    Sexual activity: Yes     Partners: Male     Birth control/protection: Surgical   Other Topics Concern    Parent/sibling w/ CABG, MI or angioplasty before 65F 55M? Yes     Comment: father     Social Drivers of Health     Financial Resource Strain: Low Risk  (2/12/2025)    Financial Resource Strain     Within the past 12 months, have you or your family members you live with been unable to get utilities (heat, electricity) when it was really needed?: No   Interpersonal Safety: Low Risk  (10/25/2024)    Interpersonal Safety     Do you feel physically and emotionally safe where you currently live?: Yes     Within the past 12 months, have you been hit, slapped, kicked or otherwise physically hurt by someone?: No     Within the past 12 months, have you been humiliated or emotionally abused in other ways by your partner or ex-partner?: No       Review of Systems:  Skin:          Eyes:         ENT:         Respiratory:          Cardiovascular:         Gastroenterology:        Genitourinary:         Musculoskeletal:         Neurologic:         Psychiatric:         Heme/Lymph/Imm:         Endocrine:           Physical Exam:  Vitals: LMP  (LMP Unknown)     Constitutional:           Skin:             Head:           Eyes:           Lymph:      ENT:           Neck:           Respiratory:            Cardiac:                                                           GI:           Extremities and Muscular Skeletal:                 Neurological:           Psych:           CC  Gutierrez Smith MD  [address]  [place] [address]

## 2025-04-17 ENCOUNTER — OFFICE VISIT (OUTPATIENT)
Dept: CARDIOLOGY | Facility: CLINIC | Age: 85
End: 2025-04-17
Attending: INTERNAL MEDICINE
Payer: MEDICARE

## 2025-04-17 VITALS
HEIGHT: 64 IN | SYSTOLIC BLOOD PRESSURE: 113 MMHG | HEART RATE: 60 BPM | WEIGHT: 136.1 LBS | DIASTOLIC BLOOD PRESSURE: 65 MMHG | RESPIRATION RATE: 20 BRPM | OXYGEN SATURATION: 98 % | BODY MASS INDEX: 23.23 KG/M2

## 2025-04-17 DIAGNOSIS — I10 BENIGN ESSENTIAL HYPERTENSION: ICD-10-CM

## 2025-04-17 DIAGNOSIS — R55 SYNCOPE, UNSPECIFIED SYNCOPE TYPE: ICD-10-CM

## 2025-04-17 RX ORDER — LISINOPRIL 10 MG/1
20 TABLET ORAL DAILY
Qty: 90 TABLET | Refills: 3 | Status: SHIPPED | OUTPATIENT
Start: 2025-04-17 | End: 2025-04-17

## 2025-04-17 RX ORDER — LISINOPRIL 10 MG/1
10 TABLET ORAL DAILY
Qty: 90 TABLET | Refills: 3 | Status: SHIPPED | OUTPATIENT
Start: 2025-04-17

## 2025-04-17 RX ORDER — ASPIRIN 81 MG/1
81 TABLET ORAL DAILY
COMMUNITY

## 2025-04-17 NOTE — LETTER
4/17/2025    Natalie Durant, DNP  [address] [place] [id number]    RE: Catherine Salgado       Dear Colleague,     I had the pleasure of seeing Catherine ARORA Salgado in the [facility].        Reason for Consultation: Syncope, bradycardia.      History of Presenting Illness: This is an 85-year-old who has been seen within the [facility] system most recently in August 2024.  She had presented with a syncopal event that appeared to be vasovagal and that it occurred while standing and was accompanied by a prodrome of diaphoresis.  No associated cardiac symptoms were present.  This is in the context of a history of vasovagal syncope as a young adult.    She underwent an EKG which demonstrated sinus bradycardia with borderline first-degree AV block.  An echocardiogram was essentially normal.  A Zio patch monitor demonstrated no significant pauses with a few short runs of atrial tachycardia.    She was admitted to [facility] from 2/11/2025 through 2/12/2025 with a near syncopal event associated with bradycardia and vomiting.  Her heart rate was noted to be in the 40s at the time she was seen by EMS and she received atropine with improvement in her heart rate.    It appears that she was feeling well the day of admission but then started experiencing sudden onset flushing, diaphoresis and nausea and vomiting while driving home.  This was then followed by shortness of breath and loss of consciousness.    Her admission evaluation was unremarkable with no significant pauses on telemetry.  She was found to have evidence of persistent hypothyroidism despite supplementation.  Free T4 was normal however.  She has undergone recent adjustments in her levothyroxine dose to address this.    She presented today accompanied by her .  He feels that she was dehydrated at the day of her syncopal event back in February.  She is not very active but does share household duties with her   and denies any exertional chest discomfort, dyspnea, PND or orthopnea.  She has had occasional recurrences of her flushing and diaphoresis along with palpitations, several episodes of which were documented at the time of event monitoring.    She does have a remote history of a CVA.  It does not appear that the etiology was identified at the time.      PMH, FH, SH, Allergies and Meds: As outlined below.    Physical Exam:    Gen: NAD  Respiratory : Clear to Auscultation.  Cardiovascular: RRR, no murmurs  Extremities: No edema.    EKG: EKG on 2/11/2025 was independently reviewed and interpreted and demonstrated sinus rhythm with no acute ST-T wave abnormalities.    Labs: Lipid panel on 8/9/2024 demonstrated total cholesterol 225, HDL 57, LDL of 140 and triglycerides of 138.    Other Cardiac Testing:    She reported dizziness on multiple occasions as well as palpitations during recent event monitoring.  All tracings demonstrated sinus rhythm with sinus bradycardia and PACs.    Her CTA of the chest dated 2/11/2025 was also reviewed.  It demonstrated mild to moderate coronary artery calcification with mild dilatation of the ascending thoracic aorta.      IMPRESSION:    Syncope, most likely vasovagal.   2.  Short runs of atrial tachycardia noted on previous Zio patch monitoring.  Her most recent Zio patch monitor demonstrated sinus rhythm with PACs.  3.  Episodic flushing and diaphoresis.  She does have a history of hypothyroidism with persistently elevated thyroid-stimulating hormone.  4.  History of CVA in the past.  5.  Active tobacco use.          PLAN:    Ms. Salgado presents with a history of probable vasovagal syncope in the context of nausea and vomiting in February of this year.  She was noted to have sinus bradycardia at the time of her initial evaluation which would be consistent with vasovagal syncope.        Her previous cardiac workup has been unremarkable and that she has normal biventricular systolic  function without evidence of significant valvular disease and event monitoring on 2 occasions has demonstrated no evidence of significant arrhythmias that could contribute to her symptoms.  Cardiac troponins at the time of her syncopal event in 2024 were within normal limits.    She does have a substantial risk factor profile for significant underlying coronary disease and I think that a stress echocardiogram would be appropriate to rule this out.  I also think that an extended period of cardiac monitoring would be reasonable given her ongoing symptoms, I have recommended a loop recorder implant to this effect.    She did bring a record of her blood pressure readings and they have been generally on the lower side with systolics less than 120 mmHg on a reduced dose of lisinopril.  Given the hypotension could exacerbate potential vasovagal syncope I have changed her prescription of lisinopril to 10 mg daily.    It was a pleasure seeing her today.  We will arrange follow-up in approximately 3 months in our clinic at which point we will review the results of her stress echocardiogram as well as any loop recorder tracings.      The longitudinal plan of care for the diagnosis(es)/condition(s) as documented were addressed during this visit. Due to the added complexity in care, I will continue to support Catherine in the subsequent management and with ongoing continuity of care.    Over 60 minutes today spent pre and post charting, reviewing pertinent previous records as well as imaging studies personally and discussing and coordinating further evaluation for the patient's symptoms.        Gutierrez Smith M.D.       CURRENT MEDICATIONS:  Current Outpatient Medications   Medication Sig Dispense Refill     EPINEPHrine (ANY BX GENERIC EQUIV) 0.3 MG/0.3ML injection 2-pack Inject 0.3 mLs (0.3 mg) into the muscle as needed for anaphylaxis 2 each 0     levothyroxine (SYNTHROID/LEVOTHROID) 75 MCG tablet Take 1 tablet (75 mcg) by mouth  daily. NEEDS LAB VISIT FOR FURTHER REFILLS 90 tablet 0     lisinopril (ZESTRIL) 20 MG tablet Take 1 tablet (20 mg) by mouth daily 90 tablet 3     OVER-THE-COUNTER Take 81 mg by mouth 2 times daily. Regular pink swallow tab       tetrahydrozoline (VISINE) 0.05 % ophthalmic solution Place 1 drop into both eyes daily.       VITAMIN E PO Take 1 capsule by mouth daily.         ALLERGIES     Allergies   Allergen Reactions     Wasps [Hornets]      Was stung in face by wasp and had significant facial swelling, Aug 2018     Bees      Codeine Other (See Comments)     Confusion, was given Tylenol # 3 after hysterectomy and developed mild confusion      Contrast Dye Other (See Comments)     Severe arthritic reaction     Gadavist [Gadobutrol] Other (See Comments)     Did ok if benadryl is given.      Pcn [Penicillins] Hives       PAST MEDICAL HISTORY:  Past Medical History:   Diagnosis Date     Bunion     Bilateral     Chest pain, unspecified      Generalized osteoarthrosis, unspecified site     Cervical     Macular degeneration (senile) of retina, unspecified      Other and unspecified hyperlipidemia      Phlebitis and thrombophlebitis of other deep vessels of lower extremities     DVT on OCP     TOBACCO ABUSE-CONTINUOUS      Unspecified essential hypertension        PAST SURGICAL HISTORY:  Past Surgical History:   Procedure Laterality Date     COLONOSCOPY  5/27/2005    Diverticulosis     SURGICAL HISTORY OF -       T&A     SURGICAL HISTORY OF -   10/1976    Vaginal hysterectomy, A & P repair       FAMILY HISTORY:  Family History   Problem Relation Age of Onset     Cerebrovascular Disease Mother         bianca JULIO Father      Cancer Father         kidney CA     Cardiovascular Sister         cardiac arrest     Musculoskeletal Disorder Daughter         rotator cuff, knee repair     Diabetes Sister      Musculoskeletal Disorder Daughter         ankle and arm injuries     Musculoskeletal Disorder Daughter          Knees     Hypertension Sister        SOCIAL HISTORY:  Social History     Socioeconomic History     Marital status:    Tobacco Use     Smoking status: Every Day     Current packs/day: 0.50     Average packs/day: 0.5 packs/day for 50.0 years (25.0 ttl pk-yrs)     Types: Cigarettes     Smokeless tobacco: Never     Tobacco comments:     Under a half a pack a day about    Vaping Use     Vaping status: Never Used   Substance and Sexual Activity     Alcohol use: Not Currently     Comment: rare     Drug use: No     Sexual activity: Yes     Partners: Male     Birth control/protection: Surgical   Other Topics Concern     Parent/sibling w/ CABG, MI or angioplasty before 65F 55M? Yes     Comment: father     Social Drivers of Health     Financial Resource Strain: Low Risk  (2/12/2025)    Financial Resource Strain      Within the past 12 months, have you or your family members you live with been unable to get utilities (heat, electricity) when it was really needed?: No   Interpersonal Safety: Low Risk  (10/25/2024)    Interpersonal Safety      Do you feel physically and emotionally safe where you currently live?: Yes      Within the past 12 months, have you been hit, slapped, kicked or otherwise physically hurt by someone?: No      Within the past 12 months, have you been humiliated or emotionally abused in other ways by your partner or ex-partner?: No       Review of Systems:  Skin:          Eyes:         ENT:         Respiratory:          Cardiovascular:         Gastroenterology:        Genitourinary:         Musculoskeletal:         Neurologic:         Psychiatric:         Heme/Lymph/Imm:         Endocrine:           Physical Exam:  Vitals: LMP  (LMP Unknown)     Constitutional:           Skin:             Head:           Eyes:           Lymph:      ENT:           Neck:           Respiratory:            Cardiac:                                                           GI:           Extremities and Muscular Skeletal:                  Neurological:           Psych:           CC  Gutierrez Smith MD  [address]  [place] [address]                  Thank you for allowing me to participate in the care of your patient.      Sincerely,     Gutierrez Smith MD     [facility]  cc:   Gutierrez Smith MD  [address]  [place] [address]

## 2025-04-29 ENCOUNTER — TELEPHONE (OUTPATIENT)
Dept: CARDIOLOGY | Facility: CLINIC | Age: 85
End: 2025-04-29
Payer: MEDICARE

## 2025-04-29 NOTE — TELEPHONE ENCOUNTER
Spoke to pt about scheduling Loop Implant, pt states she knows nothing about it and wants to talk with Dr. Smith's nurse before scheduling anything, gave pt TM 2 number.

## 2025-05-06 ENCOUNTER — HOSPITAL ENCOUNTER (OUTPATIENT)
Dept: CARDIOLOGY | Facility: CLINIC | Age: 85
Discharge: HOME | End: 2025-05-06
Attending: INTERNAL MEDICINE | Admitting: INTERNAL MEDICINE
Payer: MEDICARE

## 2025-05-06 DIAGNOSIS — N18.31 CHRONIC KIDNEY DISEASE (CKD) STAGE G3A/A1, MODERATELY DECREASED GLOMERULAR FILTRATION RATE (GFR) BETWEEN 45-59 ML/MIN/1.73 SQUARE METER AND ALBUMINURIA CREATININE RATIO LESS THAN 30 MG/G (H): Primary | ICD-10-CM

## 2025-05-06 DIAGNOSIS — N20.0 URIC ACID NEPHROLITHIASIS: ICD-10-CM

## 2025-05-06 DIAGNOSIS — R80.9 PROTEINURIA: ICD-10-CM

## 2025-05-06 DIAGNOSIS — N25.81 SECONDARY HYPERPARATHYROIDISM OF RENAL ORIGIN: ICD-10-CM

## 2025-05-06 DIAGNOSIS — R55 SYNCOPE, UNSPECIFIED SYNCOPE TYPE: ICD-10-CM

## 2025-05-06 DIAGNOSIS — I10 ESSENTIAL HYPERTENSION, MALIGNANT: ICD-10-CM

## 2025-05-06 PROCEDURE — 93325 DOPPLER ECHO COLOR FLOW MAPG: CPT | Mod: TC

## 2025-05-06 PROCEDURE — 93321 DOPPLER ECHO F-UP/LMTD STD: CPT | Mod: 26 | Performed by: INTERNAL MEDICINE

## 2025-05-06 PROCEDURE — 93350 STRESS TTE ONLY: CPT | Mod: 26 | Performed by: INTERNAL MEDICINE

## 2025-05-06 PROCEDURE — 93018 CV STRESS TEST I&R ONLY: CPT | Performed by: INTERNAL MEDICINE

## 2025-05-06 PROCEDURE — 93325 DOPPLER ECHO COLOR FLOW MAPG: CPT | Mod: 26 | Performed by: INTERNAL MEDICINE

## 2025-05-06 PROCEDURE — 93016 CV STRESS TEST SUPVJ ONLY: CPT | Performed by: STUDENT IN AN ORGANIZED HEALTH CARE EDUCATION/TRAINING PROGRAM

## 2025-05-31 ENCOUNTER — LAB (OUTPATIENT)
Dept: LAB | Facility: CLINIC | Age: 85
End: 2025-05-31
Payer: MEDICARE

## 2025-05-31 DIAGNOSIS — N25.81 SECONDARY HYPERPARATHYROIDISM OF RENAL ORIGIN: ICD-10-CM

## 2025-05-31 DIAGNOSIS — E03.9 HYPOTHYROIDISM, UNSPECIFIED TYPE: ICD-10-CM

## 2025-05-31 DIAGNOSIS — N20.0 URIC ACID NEPHROLITHIASIS: ICD-10-CM

## 2025-05-31 DIAGNOSIS — N18.31 CHRONIC KIDNEY DISEASE (CKD) STAGE G3A/A1, MODERATELY DECREASED GLOMERULAR FILTRATION RATE (GFR) BETWEEN 45-59 ML/MIN/1.73 SQUARE METER AND ALBUMINURIA CREATININE RATIO LESS THAN 30 MG/G (H): ICD-10-CM

## 2025-05-31 DIAGNOSIS — R80.9 PROTEINURIA: ICD-10-CM

## 2025-05-31 DIAGNOSIS — I10 ESSENTIAL HYPERTENSION, MALIGNANT: ICD-10-CM

## 2025-05-31 LAB
ALBUMIN SERPL BCG-MCNC: 3.9 G/DL (ref 3.5–5.2)
ANION GAP SERPL CALCULATED.3IONS-SCNC: 10 MMOL/L (ref 7–15)
BUN SERPL-MCNC: 26.1 MG/DL (ref 8–23)
CALCIUM SERPL-MCNC: 9.6 MG/DL (ref 8.8–10.4)
CHLORIDE SERPL-SCNC: 103 MMOL/L (ref 98–107)
CREAT SERPL-MCNC: 1.24 MG/DL (ref 0.51–0.95)
CREAT UR-MCNC: 102.3 MG/DL
EGFRCR SERPLBLD CKD-EPI 2021: 43 ML/MIN/1.73M2
GLUCOSE SERPL-MCNC: 85 MG/DL (ref 70–99)
HCO3 SERPL-SCNC: 25 MMOL/L (ref 22–29)
HGB BLD-MCNC: 13.4 G/DL (ref 11.7–15.7)
MCV RBC AUTO: 95 FL (ref 78–100)
MICROALBUMIN UR-MCNC: 60.1 MG/L
MICROALBUMIN/CREAT UR: 58.75 MG/G CR (ref 0–25)
PHOSPHATE SERPL-MCNC: 3 MG/DL (ref 2.5–4.5)
POTASSIUM SERPL-SCNC: 4.7 MMOL/L (ref 3.4–5.3)
PTH-INTACT SERPL-MCNC: 36 PG/ML (ref 15–65)
SODIUM SERPL-SCNC: 138 MMOL/L (ref 135–145)
TSH SERPL DL<=0.005 MIU/L-ACNC: 3.21 UIU/ML (ref 0.3–4.2)
VIT D+METAB SERPL-MCNC: 48 NG/ML (ref 20–50)

## 2025-05-31 PROCEDURE — 83970 ASSAY OF PARATHORMONE: CPT

## 2025-05-31 PROCEDURE — 82570 ASSAY OF URINE CREATININE: CPT

## 2025-05-31 PROCEDURE — 85018 HEMOGLOBIN: CPT

## 2025-05-31 PROCEDURE — 82306 VITAMIN D 25 HYDROXY: CPT

## 2025-05-31 PROCEDURE — 82043 UR ALBUMIN QUANTITATIVE: CPT

## 2025-05-31 PROCEDURE — 84443 ASSAY THYROID STIM HORMONE: CPT

## 2025-05-31 PROCEDURE — 80069 RENAL FUNCTION PANEL: CPT

## 2025-06-02 ENCOUNTER — RESULTS FOLLOW-UP (OUTPATIENT)
Dept: FAMILY MEDICINE | Facility: CLINIC | Age: 85
End: 2025-06-02

## 2025-06-02 DIAGNOSIS — E03.9 HYPOTHYROIDISM, UNSPECIFIED TYPE: ICD-10-CM

## 2025-06-02 RX ORDER — LEVOTHYROXINE SODIUM 75 UG/1
75 TABLET ORAL DAILY
Qty: 90 TABLET | Refills: 3 | Status: SHIPPED | OUTPATIENT
Start: 2025-06-02

## 2025-06-03 DIAGNOSIS — I10 ESSENTIAL HYPERTENSION, MALIGNANT: ICD-10-CM

## 2025-06-03 DIAGNOSIS — R80.9 PROTEINURIA: ICD-10-CM

## 2025-06-03 DIAGNOSIS — N18.31 CHRONIC KIDNEY DISEASE (CKD) STAGE G3A/A1, MODERATELY DECREASED GLOMERULAR FILTRATION RATE (GFR) BETWEEN 45-59 ML/MIN/1.73 SQUARE METER AND ALBUMINURIA CREATININE RATIO LESS THAN 30 MG/G (H): Primary | ICD-10-CM

## 2025-06-03 DIAGNOSIS — N25.81 SECONDARY HYPERPARATHYROIDISM OF RENAL ORIGIN: ICD-10-CM

## 2025-06-03 DIAGNOSIS — N20.0 URIC ACID NEPHROLITHIASIS: ICD-10-CM

## 2025-06-16 ENCOUNTER — TELEPHONE (OUTPATIENT)
Dept: FAMILY MEDICINE | Facility: CLINIC | Age: 85
End: 2025-06-16
Payer: MEDICARE

## 2025-06-16 NOTE — TELEPHONE ENCOUNTER
Patient Quality Outreach    Patient is due for the following:   Physical Annual Wellness Visit    Action(s) Taken:   Schedule a Annual Wellness Visit    Type of outreach:    Sent [employer] message.    Questions for provider review:    None         Bailee Kahler  Chart routed to None.

## 2025-07-12 ENCOUNTER — HOSPITAL ENCOUNTER (EMERGENCY)
Facility: CLINIC | Age: 85
Discharge: HOME | End: 2025-07-12
Attending: NURSE PRACTITIONER | Admitting: NURSE PRACTITIONER
Payer: MEDICARE

## 2025-07-12 VITALS
TEMPERATURE: 97.5 F | OXYGEN SATURATION: 94 % | HEART RATE: 79 BPM | SYSTOLIC BLOOD PRESSURE: 117 MMHG | DIASTOLIC BLOOD PRESSURE: 76 MMHG | RESPIRATION RATE: 18 BRPM

## 2025-07-12 DIAGNOSIS — K04.7 DENTAL INFECTION: ICD-10-CM

## 2025-07-12 PROCEDURE — G0463 HOSPITAL OUTPT CLINIC VISIT: HCPCS

## 2025-07-12 PROCEDURE — 99213 OFFICE O/P EST LOW 20 MIN: CPT | Performed by: NURSE PRACTITIONER

## 2025-07-12 RX ORDER — PREDNISONE 20 MG/1
20 TABLET ORAL 2 TIMES DAILY
Qty: 6 TABLET | Refills: 0 | Status: SHIPPED | OUTPATIENT
Start: 2025-07-12 | End: 2025-07-15

## 2025-07-12 RX ORDER — AZITHROMYCIN 250 MG/1
TABLET, FILM COATED ORAL
COMMUNITY
Start: 2025-07-10

## 2025-07-12 RX ORDER — CEFDINIR 300 MG/1
300 CAPSULE ORAL 2 TIMES DAILY
Qty: 20 CAPSULE | Refills: 0 | Status: SHIPPED | OUTPATIENT
Start: 2025-07-12 | End: 2025-07-22

## 2025-07-12 ASSESSMENT — COLUMBIA-SUICIDE SEVERITY RATING SCALE - C-SSRS
1. IN THE PAST MONTH, HAVE YOU WISHED YOU WERE DEAD OR WISHED YOU COULD GO TO SLEEP AND NOT WAKE UP?: NO
2. HAVE YOU ACTUALLY HAD ANY THOUGHTS OF KILLING YOURSELF IN THE PAST MONTH?: NO
6. HAVE YOU EVER DONE ANYTHING, STARTED TO DO ANYTHING, OR PREPARED TO DO ANYTHING TO END YOUR LIFE?: NO

## 2025-07-12 ASSESSMENT — ACTIVITIES OF DAILY LIVING (ADL): ADLS_ACUITY_SCORE: 51

## 2025-07-12 NOTE — DISCHARGE INSTRUCTIONS
Cefdinir for antibiotics for you.  You can stop the azithromycin.  I also ordered some prednisone for you that should help with some of the swelling that you have.  Recommend that you do keep your dental appointment on Monday.  Symptoms seem to worsen you develop fever or chills despite oral antibiotics you should return and be evaluated.

## 2025-07-12 NOTE — ED TRIAGE NOTES
pt was at dentist and has RT sided swelling and pain that's a 10/10. Tooth is being pulled on Monday.  Shauna Sam MA

## 2025-07-12 NOTE — ED PROVIDER NOTES
ED Provider Note  [facility]      History     Chief Complaint   Patient presents with    Dental Pain    Otalgia     HPI  Catherine Salgado is a 85 year old female who presents with ongoing dental infection she was seen 4 days ago in dentist office and prescribed azithromycin which has not been terribly helpful patient reports.  She has an appointment on Monday to have the tooth removed.  Continues to have some swelling present and pain that extends into her face and ear.  Denies any measured fever or chills.  Past medical history of HTN, HLD, hypothyroidism, CKD             Allergies:  Allergies   Allergen Reactions    Wasps [Hornets]      Was stung in face by wasp and had significant facial swelling, Aug 2018    Bees     Codeine Other (See Comments)     Confusion, was given Tylenol # 3 after hysterectomy and developed mild confusion     Contrast Dye Other (See Comments)     Severe arthritic reaction    Gadavist [Gadobutrol] Other (See Comments)     Did ok if benadryl is given.     Pcn [Penicillins] Hives       Problem List:    Patient Active Problem List    Diagnosis Date Noted    Syncope, unspecified syncope type 02/11/2025     Priority: Medium    Chronic kidney disease, stage 3a (H) 04/12/2024     Priority: Medium    Tobacco abuse 05/20/2021     Priority: Medium    Cerebral infarction (H) 10/31/2015     Priority: Medium     Diagnosis updated by automated process. Provider to review and confirm.      Hypothyroidism 08/14/2013     Priority: Medium    HYPERLIPIDEMIA LDL GOAL <130 10/31/2010     Priority: Medium    Hypertension goal BP (blood pressure) < 140/90      Priority: Medium     dx date - ?  CV risk factors previous smoker, family history and lipids  FH positive for diabetes mellitus and cardiovascular disease  history of renal disease negative      Macular degeneration (senile) of retina      Priority: Medium     Problem list name updated by automated process. Provider to review       Hyperlipidemia      Priority: Medium     CHOL      282   03/11/2005  LDL      177   03/11/2005  HDL       35   03/11/2005  Problem list name updated by automated process. Provider to review      Bunion      Priority: Medium     Bilateral      Generalized osteoarthrosis, unspecified site      Priority: Medium     Cervical          Past Medical History:    Past Medical History:   Diagnosis Date    Bunion     Chest pain, unspecified     Generalized osteoarthrosis, unspecified site     Macular degeneration (senile) of retina, unspecified     Other and unspecified hyperlipidemia     Phlebitis and thrombophlebitis of other deep vessels of lower extremities     TOBACCO ABUSE-CONTINUOUS     Unspecified essential hypertension        Past Surgical History:    Past Surgical History:   Procedure Laterality Date    COLONOSCOPY  5/27/2005    Diverticulosis    SURGICAL HISTORY OF -       T&A    SURGICAL HISTORY OF -   10/1976    Vaginal hysterectomy, A & P repair       Social History:  Marital Status:   [2]  Social History     Tobacco Use    Smoking status: Every Day     Current packs/day: 0.50     Average packs/day: 0.5 packs/day for 50.0 years (25.0 ttl pk-yrs)     Types: Cigarettes    Smokeless tobacco: Never    Tobacco comments:     Under a half a pack a day about    Vaping Use    Vaping status: Never Used   Substance Use Topics    Alcohol use: Not Currently     Comment: rare    Drug use: No        Medications:    azithromycin (ZITHROMAX) 250 MG tablet  predniSONE (DELTASONE) 20 MG tablet  aspirin 81 MG EC tablet  cefdinir (OMNICEF) 300 MG capsule  EPINEPHrine (ANY BX GENERIC EQUIV) 0.3 MG/0.3ML injection 2-pack  levothyroxine (SYNTHROID/LEVOTHROID) 75 MCG tablet  lisinopril (ZESTRIL) 10 MG tablet  tetrahydrozoline (VISINE) 0.05 % ophthalmic solution  VITAMIN E PO          Review of Systems  A medically appropriate review of systems was performed with pertinent positives and negatives noted in the HPI, and all other  systems negative.    Physical Exam   Patient Vitals for the past 24 hrs:   BP Temp Temp src Pulse Resp SpO2   07/12/25 0940 117/76 97.5  F (36.4  C) Tympanic 79 18 94 %          Physical Exam  General: alert and in no acute distress on arrival  Head: atraumatic, normocephalic, ENT: Left Ear: TM intact, middle ear is not erythremic, no purulence, canal patent. Right Ear: TM intact, middle ear is not erythremic, no purulence, canal patent. Nose: No erythema or edema patent nostrils bilateral. Throat: midline uvula, non-erythremic, non-enlarged tonsils, without exudate.  Tender right-sided anterior cervical lymphadenopathy.  Has tenderness of her jawline and lower right gumline that is erythematous and there is no obvious abscess palpated  Lungs:  nonlabored, CTA bilateral throughout.  CV: Regular rate, extremities warm and perfused  Skin: no rashes, no diaphoresis and skin color normal  Neuro: Patient awake, alert, speech is fluent, no focal deficits  Psychiatric: affect/mood normal, appropriate historian      ED Course                 Procedures                    No results found for this or any previous visit (from the past 48 hours).    MEDICATIONS GIVEN IN THE EMERGENCY DEPARTMENT:  Medications - No data to display             Assessments & Plan (with Medical Decision Making)  85 year old female who presents to the Urgent Care for evaluation of facial pain, dental infection of her right lower gumline there is erythema present on exam no obvious abscess palpated.  Patient has a history of a penicillin allergy her dentist had tried azithromycin without improvement pain and swelling.  Pain is reported over her right lower gumline and extends into her right ear.  She has tender lymphadenopathy right-sided.  Due to the tenderness the patient has prednisone ordered twice daily for 3 days, to decrease inflammation and antibiotic changed to cefdinir 300 mg twice daily for 10 days.  Recommend that she keep her dentist  appointment in a few days which is already scheduled.  Advised if she develops any new onset of worsening fever that is not improving with Tylenol or ibuprofen and antibiotic she should return to be seen in the emergency department.  Patient discharged home in stable condition.  Patient appeared nontoxic on exam      Patient verbalized agreement with and understanding of the rational for the diagnosis and treatment plan.  All questions were answered to best of my ability and the patient's satisfaction. The patient was advised to contact or return to their primary clinic or UC/ED with any questions that may arise after this visit.       I have reviewed the nursing notes.    I have reviewed the findings, diagnosis, plan and need for follow up with the patient.        NEW PRESCRIPTIONS STARTED AT TODAY'S ER VISIT  New Prescriptions    CEFDINIR (OMNICEF) 300 MG CAPSULE    Take 1 capsule (300 mg) by mouth 2 times daily for 10 days.    PREDNISONE (DELTASONE) 20 MG TABLET    Take 1 tablet (20 mg) by mouth 2 times daily for 3 days.       Final diagnoses:   Dental infection       7/12/2025   [facility] EMERGENCY DEPT    Disclaimer: This note consists of symbols derived from keyboarding, dictation, and/or voice recognition software. As a result, there may be errors in the script that have gone undetected.  Please consider this when interpreting information found in the chart.      Becky Odom APRN CNP  07/16/25 2010       Becky Odom APRN CNP  07/16/25 2011

## 2025-07-14 ENCOUNTER — OFFICE VISIT (OUTPATIENT)
Dept: CARDIOLOGY | Facility: CLINIC | Age: 85
End: 2025-07-14
Attending: INTERNAL MEDICINE
Payer: MEDICARE

## 2025-07-14 VITALS
SYSTOLIC BLOOD PRESSURE: 140 MMHG | BODY MASS INDEX: 23.34 KG/M2 | OXYGEN SATURATION: 96 % | WEIGHT: 136 LBS | DIASTOLIC BLOOD PRESSURE: 78 MMHG | HEART RATE: 64 BPM | RESPIRATION RATE: 14 BRPM

## 2025-07-14 DIAGNOSIS — R61 DIAPHORESIS: ICD-10-CM

## 2025-07-14 DIAGNOSIS — I35.8 AORTIC VALVE MASS: Primary | ICD-10-CM

## 2025-07-14 DIAGNOSIS — I10 ESSENTIAL HYPERTENSION: ICD-10-CM

## 2025-07-14 DIAGNOSIS — Z86.73 HISTORY OF CVA (CEREBROVASCULAR ACCIDENT): ICD-10-CM

## 2025-07-14 DIAGNOSIS — R55 SYNCOPE, UNSPECIFIED SYNCOPE TYPE: ICD-10-CM

## 2025-07-14 DIAGNOSIS — R94.31 ABNORMAL ELECTROCARDIOGRAM (ECG) (EKG): ICD-10-CM

## 2025-07-14 PROCEDURE — 99215 OFFICE O/P EST HI 40 MIN: CPT | Performed by: NURSE PRACTITIONER

## 2025-07-14 PROCEDURE — 3078F DIAST BP <80 MM HG: CPT | Performed by: NURSE PRACTITIONER

## 2025-07-14 PROCEDURE — 87040 BLOOD CULTURE FOR BACTERIA: CPT | Performed by: NURSE PRACTITIONER

## 2025-07-14 PROCEDURE — 3077F SYST BP >= 140 MM HG: CPT | Performed by: NURSE PRACTITIONER

## 2025-07-14 PROCEDURE — 36415 COLL VENOUS BLD VENIPUNCTURE: CPT | Performed by: NURSE PRACTITIONER

## 2025-07-14 NOTE — PATIENT INSTRUCTIONS
Thank you for your visit with the [facility] today.    Today's plan:   - Check a transesophageal echocardiogram (KERRY) and blood cultures to help rule out the possibility of endocarditis or infection of your aortic valve.  - Have the loop recorder device placed to help look into causes of your episodes of passing out.  - Check a lexiscan nuclear stress test to help rule out blockages of the arteries of your heart (coronary artery disease).  - Follow up with Clifton in the clinic in about 2 months for review of the test results.    If you have questions please call the nurse team at [phone] or send a GTxcel message.     Scheduling phone number: [phone]    It was a pleasure seeing you today!     ROSALEE Sanders, CNP  Nurse Practitioner  [facility]

## 2025-07-14 NOTE — PROGRESS NOTES
Cardiology Clinic Progress Note    Service Date: 07/14/2025   Primary Cardiology Team: Dr. Smith    HISTORY OF PRESENT ILLNESS:  I had the pleasure of meeting Catherine Salgado in the clinic today. She is a very pleasant 85 year old female with a past medical history notable for hypothyroidism, CVA, ongoing tobacco use, and recurrent syncopal episodes.    In August 2024, she presented with a syncopal event that appeared to be vasovagal and that it occurred while standing and was accompanied by a prodrome of diaphoresis.  No associated cardiac symptoms were present.  This is in the context of a history of vasovagal syncope as a young adult.     She underwent an EKG which demonstrated sinus bradycardia with borderline first-degree AV block.  An echocardiogram was essentially normal.  A Zio patch monitor demonstrated no significant pauses with a few short runs of atrial tachycardia.     She was admitted to [facility] from 2/11/2025 through 2/12/2025 with a near syncopal event associated with bradycardia and vomiting.  Her heart rate was noted to be in the 40s at the time she was seen by EMS and she received atropine with improvement in her heart rate. Her admission evaluation was unremarkable with no significant pauses on telemetry. She was found to have evidence of persistent hypothyroidism despite supplementation. However, free T4 has been normal. She has undergone adjustments in her levothyroxine dose to address this.     She met with Dr. Smith in cardiology consultation in the clinic in May 2025. An exercise stress echocardiogram was recommended as well as an extended period of cardiac monitoring with loop recorder implant to more definitively help rule out cardiac origin for her ongoing symptoms.     The stress echocardiogram was completed on 5/6/25 showing a small mobile echodensity attached to the aortic aspect of the right coronary cusp of the aortic valve associated with aortic regurgitation.   The stress portion of the study was nondiagnostic due to inability to achieve target heart rate, but she did reach 4 METS with no ischemia noted. This was reviewed by Dr. Smith who recommended a Lexiscan nuclear stress test and KERRY for further evaluation of her aortic valve in the echodensity noted given her history of CVA.  The patient was noted to be hesitant to pursue further testing.  She also did not want to proceed with the loop recorder implant as a previously been discussed at her last visit with Dr. Smith.    Today, Catherine presents to the clinic in routine follow-up accompanied by her  of over 60 years along with her daughter.  She states that she was somewhat overwhelmed by the recommendations for further testing and was initially preferring more conservative management, but after discussing with her daughter she is now agreeable to pursuing loop recorder placement as well as the KERRY and Lexiscan nuclear stress test for further evaluation of her symptoms.  She states she has continued to have issues with frequent diaphoresis, most prominently at night with occasional associated chills.  She has not had any fevers.  Of note, the patient has poor dentition and was recently found to have an abscess and infection of one of her teeth for which she has been placed on antibiotics and prednisone.  She is planning to undergo a tooth extraction this afternoon.  She otherwise denies any recent symptoms of chest pain, shortness of breath, or lower extremity edema.  She has not had any recurrent syncopal spells within the last few months.     Risks and benefits for transesophageal echocardiogram (KERRY) were explained to the patient. This includes but is not limited to damage to the oral cavity, esophageal perforation, GI bleeding, pharyngeal hematoma, transient bronchospasm, transient hypoxia, arrhythmias, vomiting, hemoptysis, and complications from conscious sedation/anesthesia. Loop recorder device implant was also  discussed with the patient along with risks and benefits. Patient indicated understanding and is willing to proceed.      ASSESSMENT:  Recurrent syncope, most likely vasovagal.   Short runs of atrial tachycardia noted on previous Zio patch monitoring.  Her most recent Zio patch monitor demonstrated sinus rhythm with PACs.   Episodic flushing and diaphoresis. She does have a history of hypothyroidism with persistently elevated thyroid-stimulating hormone. She was referred to endocrinology recently, but has yet to get in with them. Of note, she was also recently diagnosed with a dental abscess and was noted to have a mobile echodensity on her aortic valve on recent echocardiogram images from 5/6/2025 as outlined above raising concern for the possibility of endocarditis contributing. She is currently being treated with antibiotics and prednisone for the dental abscess and is planning to undergo tooth extraction.  Multiple echodensity on the aortic valve noted on recent echocardiogram as outlined above.  History of CVA in the past.  Ongoing tobacco use    PLAN:  - Recommend drawing blood cultures today to help further rule out possibility of endocarditis given her dental abscess with recent symptoms diaphoresis and mobile echodensity on her aortic valve noted on recent echocardiogram.  Also recommend completing a KERRY for further evaluation of her aortic valve as previously suggested by Dr. Smiht, which the patient is now agreeable to.  - Recommend Lexiscan nuclear stress test given nondiagnostic stress echocardiogram and risk factors for CAD with ongoing symptoms as outlined above.  - Will plan for loop recorder device implant given her recurrent episodes of unexplained syncope for longer-term cardiac monitoring.  She is agreeable to proceeding with this.  - Follow-up with Clifton in the clinic in about 2 months after completing the testing and loop recorder implant as outlined above.    Thank you for the opportunity to  participate in this pleasant patient's care. We would be happy to see her sooner if needed for any concerns in the meantime.    50 total minutes was spent today including chart review, precharting, history and exam, post visit documentation, and reviewing studies as outlined above.     The longitudinal plan of care for the diagnosis(es)/condition(s) as documented were addressed during this visit. Due to the added complexity in care, I will continue to support Catherine in the subsequent management and with ongoing continuity of care.    Please note that this document was completed in part using voice recognition software. Although reviewed after completion, some word substitutions may occur. Please contact me if clarification is needed.     ROSALEE Sanders, CNP   Nurse Practitioner  [facility]    Orders this Visit:  Orders Placed This Encounter   Procedures    Follow-Up with Cardiology    Echocardiogram KERRY    Blood Culture Peripheral blood (BC)     No orders of the defined types were placed in this encounter.    There are no discontinued medications.  Encounter Diagnoses   Name Primary?    Syncope, unspecified syncope type     Aortic valve mass Yes    History of CVA (cerebrovascular accident)     Diaphoresis     Essential hypertension     Abnormal electrocardiogram (ECG) (EKG)        CURRENT MEDICATIONS:  Current Outpatient Medications   Medication Sig Dispense Refill    aspirin 81 MG EC tablet Take 81 mg by mouth daily.      azithromycin (ZITHROMAX) 250 MG tablet TAKE 2 TABLETS BY MOUTH ON DAY 1, THEN TAKE 1 TABLET DAILY ON DAYS 2-5      cefdinir (OMNICEF) 300 MG capsule Take 1 capsule (300 mg) by mouth 2 times daily for 10 days. 20 capsule 0    EPINEPHrine (ANY BX GENERIC EQUIV) 0.3 MG/0.3ML injection 2-pack Inject 0.3 mLs (0.3 mg) into the muscle as needed for anaphylaxis 2 each 0    levothyroxine (SYNTHROID/LEVOTHROID) 75 MCG tablet Take 1 tablet (75 mcg) by mouth daily. 90 tablet 3     lisinopril (ZESTRIL) 10 MG tablet Take 1 tablet (10 mg) by mouth daily. 90 tablet 3    predniSONE (DELTASONE) 20 MG tablet Take 1 tablet (20 mg) by mouth 2 times daily for 3 days. 6 tablet 0    tetrahydrozoline (VISINE) 0.05 % ophthalmic solution Place 1 drop into both eyes daily.      VITAMIN E PO Take 1 capsule by mouth daily.         ALLERGIES  Allergies   Allergen Reactions    Wasps [Hornets]      Was stung in face by wasp and had significant facial swelling, Aug 2018    Bees     Codeine Other (See Comments)     Confusion, was given Tylenol # 3 after hysterectomy and developed mild confusion     Contrast Dye Other (See Comments)     Severe arthritic reaction    Gadavist [Gadobutrol] Other (See Comments)     Did ok if benadryl is given.     Pcn [Penicillins] Hives       PAST MEDICAL, SURGICAL, FAMILY HISTORY:  History was reviewed and updated as needed, see medical record.    SOCIAL HISTORY:  Social History     Socioeconomic History    Marital status:      Spouse name: Not on file    Number of children: Not on file    Years of education: Not on file    Highest education level: Not on file   Occupational History    Not on file   Tobacco Use    Smoking status: Every Day     Current packs/day: 0.50     Average packs/day: 0.5 packs/day for 50.0 years (25.0 ttl pk-yrs)     Types: Cigarettes    Smokeless tobacco: Never    Tobacco comments:     Under a half a pack a day about    Vaping Use    Vaping status: Never Used   Substance and Sexual Activity    Alcohol use: Not Currently     Comment: rare    Drug use: No    Sexual activity: Yes     Partners: Male     Birth control/protection: Surgical   Other Topics Concern    Parent/sibling w/ CABG, MI or angioplasty before 65F 55M? Yes     Comment: father   Social History Narrative    Not on file     Social Drivers of Health     Financial Resource Strain: Low Risk  (2/12/2025)    Financial Resource Strain     Within the past 12 months, have you or your family members  you live with been unable to get utilities (heat, electricity) when it was really needed?: No   Food Insecurity: Not on file   Transportation Needs: Not on file   Physical Activity: Not on file   Stress: Not on file   Social Connections: Not on file   Interpersonal Safety: Low Risk  (10/25/2024)    Interpersonal Safety     Do you feel physically and emotionally safe where you currently live?: Yes     Within the past 12 months, have you been hit, slapped, kicked or otherwise physically hurt by someone?: No     Within the past 12 months, have you been humiliated or emotionally abused in other ways by your partner or ex-partner?: No   Housing Stability: Not on file     Review of Systems:  Focused cardiovascular and respiratory review of systems is negative other than the symptoms noted above in the HPI.     Physical Exam:  Vitals: BP (!) 140/78 (BP Location: Right arm, Patient Position: Sitting, Cuff Size: Adult Regular)   Pulse 64   Resp 14   Wt 61.7 kg (136 lb)   LMP  (LMP Unknown)   SpO2 96%   BMI 23.34 kg/m     Wt Readings from Last 4 Encounters:   07/14/25 61.7 kg (136 lb)   04/17/25 61.7 kg (136 lb 1.6 oz)   03/10/25 61.2 kg (135 lb)   02/11/25 59.9 kg (132 lb 0.9 oz)     Constitutional: Alert and in no acute distress.  Neck: No JVD.  Cardiovascular:  Regular rate and rhythm. No murmur, rub or gallop.   Respiratory: Breathing non-labored. Lungs are clear to auscultation with no wheezes or crackles bilaterally.  Gastrointestinal: Abdomen non-distended.  Extremities:  No pitting lower extremity edema bilaterally.   Neuropsychiatric: No gross focal deficits. Affect appropriate. Mentation normal.     Recent Lab Results:  LIPID RESULTS:  Lab Results   Component Value Date    CHOL 225 (H) 08/09/2024    CHOL 214 (H) 04/14/2016    HDL 57 08/09/2024    HDL 56 04/14/2016     (H) 08/09/2024     (H) 04/14/2016    TRIG 138 08/09/2024    TRIG 137 04/14/2016    CHOLHDLRATIO 4.3 11/01/2015     LIVER ENZYME  RESULTS:  Lab Results   Component Value Date    AST 24 02/12/2025    AST 23 05/20/2019    ALT 12 02/12/2025    ALT 21 05/20/2019     CBC RESULTS:  Lab Results   Component Value Date    WBC 6.6 03/10/2025    WBC 6.9 05/20/2019    RBC 4.14 03/10/2025    RBC 4.78 05/20/2019    HGB 13.4 05/31/2025    HGB 14.4 05/20/2019    HCT 40.6 03/10/2025    HCT 45.4 05/20/2019    MCV 95 05/31/2025    MCV 95 05/20/2019    MCH 31.6 03/10/2025    MCH 30.1 05/20/2019    MCHC 32.3 03/10/2025    MCHC 31.7 05/20/2019    RDW 14.3 03/10/2025    RDW 15.3 (H) 05/20/2019     03/10/2025     05/20/2019     BMP RESULTS:  Lab Results   Component Value Date     05/31/2025     05/20/2019    POTASSIUM 4.7 05/31/2025    POTASSIUM 4.1 05/03/2022    POTASSIUM 4.1 05/20/2019    CHLORIDE 103 05/31/2025    CHLORIDE 105 05/03/2022    CHLORIDE 108 05/20/2019    CO2 25 05/31/2025    CO2 29 05/03/2022    CO2 30 05/20/2019    ANIONGAP 10 05/31/2025    ANIONGAP 7 05/03/2022    ANIONGAP 3 05/20/2019    GLC 85 05/31/2025     (H) 05/03/2022    GLC 82 05/20/2019    BUN 26.1 (H) 05/31/2025    BUN 17 05/03/2022    BUN 15 05/20/2019    CR 1.24 (H) 05/31/2025    CR 0.76 05/20/2019    GFRESTIMATED 43 (L) 05/31/2025    GFRESTIMATED 76 05/20/2019    GFRESTBLACK 88 05/20/2019    ANDREY 9.6 05/31/2025    ANDREY 9.6 05/20/2019      A1C RESULTS:  Lab Results   Component Value Date    A1C 5.8 (H) 08/09/2024    A1C 6.0 11/01/2015     CC  Gutierrez Smith MD  [id number]  [place] [id number]

## 2025-07-14 NOTE — LETTER
7/14/2025    Natalie ARORAKatherine Durant, DNP  [address] [place] [id number]    RE: Catherine ARORA Damian       Dear Colleague,     I had the pleasure of seeing Catherine Salgado in the [facility].    Cardiology Clinic Progress Note    Service Date: 07/14/2025   Primary Cardiology Team: Dr. Smith    HISTORY OF PRESENT ILLNESS:  I had the pleasure of meeting Catherine Salgado in the clinic today. She is a very pleasant 85 year old female with a past medical history notable for hypothyroidism, CVA, ongoing tobacco use, and recurrent syncopal episodes.    In August 2024, she presented with a syncopal event that appeared to be vasovagal and that it occurred while standing and was accompanied by a prodrome of diaphoresis.  No associated cardiac symptoms were present.  This is in the context of a history of vasovagal syncope as a young adult.     She underwent an EKG which demonstrated sinus bradycardia with borderline first-degree AV block.  An echocardiogram was essentially normal.  A Zio patch monitor demonstrated no significant pauses with a few short runs of atrial tachycardia.     She was admitted to [facility] from 2/11/2025 through 2/12/2025 with a near syncopal event associated with bradycardia and vomiting.  Her heart rate was noted to be in the 40s at the time she was seen by EMS and she received atropine with improvement in her heart rate. Her admission evaluation was unremarkable with no significant pauses on telemetry. She was found to have evidence of persistent hypothyroidism despite supplementation. However, free T4 has been normal. She has undergone adjustments in her levothyroxine dose to address this.     She met with Dr. Smith in cardiology consultation in the clinic in May 2025. An exercise stress echocardiogram was recommended as well as an extended period of cardiac monitoring with loop recorder implant to more definitively help rule out cardiac origin for her ongoing  symptoms.     The stress echocardiogram was completed on 5/6/25 showing a small mobile echodensity attached to the aortic aspect of the right coronary cusp of the aortic valve associated with aortic regurgitation.  The stress portion of the study was nondiagnostic due to inability to achieve target heart rate, but she did reach 4 METS with no ischemia noted. This was reviewed by Dr. Smith who recommended a Lexiscan nuclear stress test and KERRY for further evaluation of her aortic valve in the echodensity noted given her history of CVA.  The patient was noted to be hesitant to pursue further testing.  She also did not want to proceed with the loop recorder implant as a previously been discussed at her last visit with Dr. Smith.    Today, Catherine presents to the clinic in routine follow-up accompanied by her  of over 60 years along with her daughter.  She states that she was somewhat overwhelmed by the recommendations for further testing and was initially preferring more conservative management, but after discussing with her daughter she is now agreeable to pursuing loop recorder placement as well as the KERRY and Lexiscan nuclear stress test for further evaluation of her symptoms.  She states she has continued to have issues with frequent diaphoresis, most prominently at night with occasional associated chills.  She has not had any fevers.  Of note, the patient has poor dentition and was recently found to have an abscess and infection of one of her teeth for which she has been placed on antibiotics and prednisone.  She is planning to undergo a tooth extraction this afternoon.  She otherwise denies any recent symptoms of chest pain, shortness of breath, or lower extremity edema.  She has not had any recurrent syncopal spells within the last few months.     Risks and benefits for transesophageal echocardiogram (KERRY) were explained to the patient. This includes but is not limited to damage to the oral cavity, esophageal  perforation, GI bleeding, pharyngeal hematoma, transient bronchospasm, transient hypoxia, arrhythmias, vomiting, hemoptysis, and complications from conscious sedation/anesthesia. Loop recorder device implant was also discussed with the patient along with risks and benefits. Patient indicated understanding and is willing to proceed.      ASSESSMENT:  Recurrent syncope, most likely vasovagal.   Short runs of atrial tachycardia noted on previous Zio patch monitoring.  Her most recent Zio patch monitor demonstrated sinus rhythm with PACs.   Episodic flushing and diaphoresis. She does have a history of hypothyroidism with persistently elevated thyroid-stimulating hormone. She was referred to endocrinology recently, but has yet to get in with them. Of note, she was also recently diagnosed with a dental abscess and was noted to have a mobile echodensity on her aortic valve on recent echocardiogram images from 5/6/2025 as outlined above raising concern for the possibility of endocarditis contributing. She is currently being treated with antibiotics and prednisone for the dental abscess and is planning to undergo tooth extraction.  Multiple echodensity on the aortic valve noted on recent echocardiogram as outlined above.  History of CVA in the past.  Ongoing tobacco use    PLAN:  - Recommend drawing blood cultures today to help further rule out possibility of endocarditis given her dental abscess with recent symptoms diaphoresis and mobile echodensity on her aortic valve noted on recent echocardiogram.  Also recommend completing a KERRY for further evaluation of her aortic valve as previously suggested by Dr. Smith, which the patient is now agreeable to.  - Recommend Lexiscan nuclear stress test given nondiagnostic stress echocardiogram and risk factors for CAD with ongoing symptoms as outlined above.  - Will plan for loop recorder device implant given her recurrent episodes of unexplained syncope for longer-term cardiac  monitoring.  She is agreeable to proceeding with this.  - Follow-up with [place] in the clinic in about 2 months after completing the testing and loop recorder implant as outlined above.    Thank you for the opportunity to participate in this pleasant patient's care. We would be happy to see her sooner if needed for any concerns in the meantime.    50 total minutes was spent today including chart review, precharting, history and exam, post visit documentation, and reviewing studies as outlined above.     The longitudinal plan of care for the diagnosis(es)/condition(s) as documented were addressed during this visit. Due to the added complexity in care, I will continue to support Catherine in the subsequent management and with ongoing continuity of care.    Please note that this document was completed in part using voice recognition software. Although reviewed after completion, some word substitutions may occur. Please contact me if clarification is needed.     ROSALEE Sanders, CNP   Nurse Practitioner  [facility]    Orders this Visit:  Orders Placed This Encounter   Procedures     Follow-Up with Cardiology     Echocardiogram KERRY     Blood Culture Peripheral blood (BC)     No orders of the defined types were placed in this encounter.    There are no discontinued medications.  Encounter Diagnoses   Name Primary?     Syncope, unspecified syncope type      Aortic valve mass Yes     History of CVA (cerebrovascular accident)      Diaphoresis      Essential hypertension      Abnormal electrocardiogram (ECG) (EKG)        CURRENT MEDICATIONS:  Current Outpatient Medications   Medication Sig Dispense Refill     aspirin 81 MG EC tablet Take 81 mg by mouth daily.       azithromycin (ZITHROMAX) 250 MG tablet TAKE 2 TABLETS BY MOUTH ON DAY 1, THEN TAKE 1 TABLET DAILY ON DAYS 2-5       cefdinir (OMNICEF) 300 MG capsule Take 1 capsule (300 mg) by mouth 2 times daily for 10 days. 20 capsule 0     EPINEPHrine (ANY BX  GENERIC EQUIV) 0.3 MG/0.3ML injection 2-pack Inject 0.3 mLs (0.3 mg) into the muscle as needed for anaphylaxis 2 each 0     levothyroxine (SYNTHROID/LEVOTHROID) 75 MCG tablet Take 1 tablet (75 mcg) by mouth daily. 90 tablet 3     lisinopril (ZESTRIL) 10 MG tablet Take 1 tablet (10 mg) by mouth daily. 90 tablet 3     predniSONE (DELTASONE) 20 MG tablet Take 1 tablet (20 mg) by mouth 2 times daily for 3 days. 6 tablet 0     tetrahydrozoline (VISINE) 0.05 % ophthalmic solution Place 1 drop into both eyes daily.       VITAMIN E PO Take 1 capsule by mouth daily.         ALLERGIES  Allergies   Allergen Reactions     Wasps [Hornets]      Was stung in face by wasp and had significant facial swelling, Aug 2018     Bees      Codeine Other (See Comments)     Confusion, was given Tylenol # 3 after hysterectomy and developed mild confusion      Contrast Dye Other (See Comments)     Severe arthritic reaction     Gadavist [Gadobutrol] Other (See Comments)     Did ok if benadryl is given.      Pcn [Penicillins] Hives       PAST MEDICAL, SURGICAL, FAMILY HISTORY:  History was reviewed and updated as needed, see medical record.    SOCIAL HISTORY:  Social History     Socioeconomic History     Marital status:      Spouse name: Not on file     Number of children: Not on file     Years of education: Not on file     Highest education level: Not on file   Occupational History     Not on file   Tobacco Use     Smoking status: Every Day     Current packs/day: 0.50     Average packs/day: 0.5 packs/day for 50.0 years (25.0 ttl pk-yrs)     Types: Cigarettes     Smokeless tobacco: Never     Tobacco comments:     Under a half a pack a day about    Vaping Use     Vaping status: Never Used   Substance and Sexual Activity     Alcohol use: Not Currently     Comment: rare     Drug use: No     Sexual activity: Yes     Partners: Male     Birth control/protection: Surgical   Other Topics Concern     Parent/sibling w/ CABG, MI or angioplasty before  65F 55M? Yes     Comment: father   Social History Narrative     Not on file     Social Drivers of Health     Financial Resource Strain: Low Risk  (2/12/2025)    Financial Resource Strain      Within the past 12 months, have you or your family members you live with been unable to get utilities (heat, electricity) when it was really needed?: No   Food Insecurity: Not on file   Transportation Needs: Not on file   Physical Activity: Not on file   Stress: Not on file   Social Connections: Not on file   Interpersonal Safety: Low Risk  (10/25/2024)    Interpersonal Safety      Do you feel physically and emotionally safe where you currently live?: Yes      Within the past 12 months, have you been hit, slapped, kicked or otherwise physically hurt by someone?: No      Within the past 12 months, have you been humiliated or emotionally abused in other ways by your partner or ex-partner?: No   Housing Stability: Not on file     Review of Systems:  Focused cardiovascular and respiratory review of systems is negative other than the symptoms noted above in the HPI.     Physical Exam:  Vitals: BP (!) 140/78 (BP Location: Right arm, Patient Position: Sitting, Cuff Size: Adult Regular)   Pulse 64   Resp 14   Wt 61.7 kg (136 lb)   LMP  (LMP Unknown)   SpO2 96%   BMI 23.34 kg/m     Wt Readings from Last 4 Encounters:   07/14/25 61.7 kg (136 lb)   04/17/25 61.7 kg (136 lb 1.6 oz)   03/10/25 61.2 kg (135 lb)   02/11/25 59.9 kg (132 lb 0.9 oz)     Constitutional: Alert and in no acute distress.  Neck: No JVD.  Cardiovascular:  Regular rate and rhythm. No murmur, rub or gallop.   Respiratory: Breathing non-labored. Lungs are clear to auscultation with no wheezes or crackles bilaterally.  Gastrointestinal: Abdomen non-distended.  Extremities:  No pitting lower extremity edema bilaterally.   Neuropsychiatric: No gross focal deficits. Affect appropriate. Mentation normal.     Recent Lab Results:  LIPID RESULTS:  Lab Results   Component  Value Date    CHOL 225 (H) 08/09/2024    CHOL 214 (H) 04/14/2016    HDL 57 08/09/2024    HDL 56 04/14/2016     (H) 08/09/2024     (H) 04/14/2016    TRIG 138 08/09/2024    TRIG 137 04/14/2016    CHOLHDLRATIO 4.3 11/01/2015     LIVER ENZYME RESULTS:  Lab Results   Component Value Date    AST 24 02/12/2025    AST 23 05/20/2019    ALT 12 02/12/2025    ALT 21 05/20/2019     CBC RESULTS:  Lab Results   Component Value Date    WBC 6.6 03/10/2025    WBC 6.9 05/20/2019    RBC 4.14 03/10/2025    RBC 4.78 05/20/2019    HGB 13.4 05/31/2025    HGB 14.4 05/20/2019    HCT 40.6 03/10/2025    HCT 45.4 05/20/2019    MCV 95 05/31/2025    MCV 95 05/20/2019    MCH 31.6 03/10/2025    MCH 30.1 05/20/2019    MCHC 32.3 03/10/2025    MCHC 31.7 05/20/2019    RDW 14.3 03/10/2025    RDW 15.3 (H) 05/20/2019     03/10/2025     05/20/2019     BMP RESULTS:  Lab Results   Component Value Date     05/31/2025     05/20/2019    POTASSIUM 4.7 05/31/2025    POTASSIUM 4.1 05/03/2022    POTASSIUM 4.1 05/20/2019    CHLORIDE 103 05/31/2025    CHLORIDE 105 05/03/2022    CHLORIDE 108 05/20/2019    CO2 25 05/31/2025    CO2 29 05/03/2022    CO2 30 05/20/2019    ANIONGAP 10 05/31/2025    ANIONGAP 7 05/03/2022    ANIONGAP 3 05/20/2019    GLC 85 05/31/2025     (H) 05/03/2022    GLC 82 05/20/2019    BUN 26.1 (H) 05/31/2025    BUN 17 05/03/2022    BUN 15 05/20/2019    CR 1.24 (H) 05/31/2025    CR 0.76 05/20/2019    GFRESTIMATED 43 (L) 05/31/2025    GFRESTIMATED 76 05/20/2019    GFRESTBLACK 88 05/20/2019    ANDREY 9.6 05/31/2025    ANDREY 9.6 05/20/2019      A1C RESULTS:  Lab Results   Component Value Date    A1C 5.8 (H) 08/09/2024    A1C 6.0 11/01/2015     CC  Gutierrez Smith MD  [address]  [place] [address]    Thank you for allowing me to participate in the care of your patient.      Sincerely,     Srikanth Howell NP     [facility]  cc:   Gutierrez  Ford Smith MD  [address]  [place] [address]

## 2025-07-15 ENCOUNTER — RESULTS FOLLOW-UP (OUTPATIENT)
Dept: CARDIOLOGY | Facility: CLINIC | Age: 85
End: 2025-07-15
Payer: MEDICARE

## 2025-07-15 NOTE — LETTER
July 16, 2025      Catherine Salgado  [address] [place] [address]        Dear ,    We are writing to inform you of your test results. There is no growth in the blood culture after 1 day.   Please do not hesitate to call with questions or concerns.  Anaya MEHTAN XAVIER  [facility]   [phone] (nurse line)  [phone] (scheduling)  [phone] (Diagnostic Imaging scheduling)  [phone] (fax)    Resulted Orders   Blood Culture Peripheral blood (BC) Arm, Right   Result Value Ref Range    Culture No growth after 1 day        If you have any questions or concerns, please call the clinic at the number listed above.       Sincerely,      Srikanth Howell NP    Electronically signed

## 2025-07-16 ENCOUNTER — APPOINTMENT (OUTPATIENT)
Dept: CT IMAGING | Facility: CLINIC | Age: 85
End: 2025-07-16
Payer: MEDICARE

## 2025-07-16 ENCOUNTER — TELEPHONE (OUTPATIENT)
Dept: FAMILY MEDICINE | Facility: CLINIC | Age: 85
End: 2025-07-16
Payer: MEDICARE

## 2025-07-16 ENCOUNTER — HOSPITAL ENCOUNTER (EMERGENCY)
Facility: CLINIC | Age: 85
Discharge: HOME | End: 2025-07-16
Payer: MEDICARE

## 2025-07-16 VITALS
RESPIRATION RATE: 18 BRPM | DIASTOLIC BLOOD PRESSURE: 63 MMHG | SYSTOLIC BLOOD PRESSURE: 126 MMHG | TEMPERATURE: 98 F | OXYGEN SATURATION: 94 % | WEIGHT: 136 LBS | HEART RATE: 52 BPM | BODY MASS INDEX: 23.34 KG/M2

## 2025-07-16 DIAGNOSIS — K92.1 MELENA: ICD-10-CM

## 2025-07-16 DIAGNOSIS — K29.00 ACUTE GASTRITIS, PRESENCE OF BLEEDING UNSPECIFIED, UNSPECIFIED GASTRITIS TYPE: ICD-10-CM

## 2025-07-16 LAB
ABO + RH BLD: NORMAL
ALBUMIN SERPL BCG-MCNC: 3.8 G/DL (ref 3.5–5.2)
ALP SERPL-CCNC: 51 U/L (ref 40–150)
ALT SERPL W P-5'-P-CCNC: 23 U/L (ref 0–50)
ANION GAP SERPL CALCULATED.3IONS-SCNC: 9 MMOL/L (ref 7–15)
APTT PPP: 26 SECONDS (ref 22–38)
AST SERPL W P-5'-P-CCNC: 28 U/L (ref 0–45)
BASOPHILS # BLD AUTO: 0 10E3/UL (ref 0–0.2)
BASOPHILS NFR BLD AUTO: 1 %
BILIRUB DIRECT SERPL-MCNC: 0.12 MG/DL (ref 0–0.3)
BILIRUB SERPL-MCNC: 0.4 MG/DL
BLD GP AB SCN SERPL QL: NEGATIVE
BUN SERPL-MCNC: 33 MG/DL (ref 8–23)
CALCIUM SERPL-MCNC: 10 MG/DL (ref 8.8–10.4)
CHLORIDE SERPL-SCNC: 105 MMOL/L (ref 98–107)
CREAT SERPL-MCNC: 1.39 MG/DL (ref 0.51–0.95)
EGFRCR SERPLBLD CKD-EPI 2021: 37 ML/MIN/1.73M2
EOSINOPHIL # BLD AUTO: 0.2 10E3/UL (ref 0–0.7)
EOSINOPHIL NFR BLD AUTO: 3 %
ERYTHROCYTE [DISTWIDTH] IN BLOOD BY AUTOMATED COUNT: 14.3 % (ref 10–15)
GLUCOSE SERPL-MCNC: 86 MG/DL (ref 70–99)
HCO3 SERPL-SCNC: 27 MMOL/L (ref 22–29)
HCT VFR BLD AUTO: 43.1 % (ref 35–47)
HGB BLD-MCNC: 14.1 G/DL (ref 11.7–15.7)
IMM GRANULOCYTES # BLD: 0.1 10E3/UL
IMM GRANULOCYTES NFR BLD: 1 %
INR PPP: 0.92 (ref 0.85–1.15)
LYMPHOCYTES # BLD AUTO: 3 10E3/UL (ref 0.8–5.3)
LYMPHOCYTES NFR BLD AUTO: 34 %
MCH RBC QN AUTO: 31.1 PG (ref 26.5–33)
MCHC RBC AUTO-ENTMCNC: 32.7 G/DL (ref 31.5–36.5)
MCV RBC AUTO: 95 FL (ref 78–100)
MONOCYTES # BLD AUTO: 0.6 10E3/UL (ref 0–1.3)
MONOCYTES NFR BLD AUTO: 6 %
NEUTROPHILS # BLD AUTO: 4.9 10E3/UL (ref 1.6–8.3)
NEUTROPHILS NFR BLD AUTO: 56 %
NRBC # BLD AUTO: 0 10E3/UL
NRBC BLD AUTO-RTO: 0 /100
PLATELET # BLD AUTO: 301 10E3/UL (ref 150–450)
POTASSIUM SERPL-SCNC: 4.6 MMOL/L (ref 3.4–5.3)
PROT SERPL-MCNC: 7.1 G/DL (ref 6.4–8.3)
PROTHROMBIN TIME: 12.3 SECONDS (ref 11.8–14.8)
RBC # BLD AUTO: 4.53 10E6/UL (ref 3.8–5.2)
SODIUM SERPL-SCNC: 141 MMOL/L (ref 135–145)
SPECIMEN EXP DATE BLD: NORMAL
WBC # BLD AUTO: 8.8 10E3/UL (ref 4–11)

## 2025-07-16 PROCEDURE — 74177 CT ABD & PELVIS W/CONTRAST: CPT

## 2025-07-16 PROCEDURE — 82248 BILIRUBIN DIRECT: CPT

## 2025-07-16 PROCEDURE — 250N000009 HC RX 250

## 2025-07-16 PROCEDURE — 99284 EMERGENCY DEPT VISIT MOD MDM: CPT

## 2025-07-16 PROCEDURE — 85610 PROTHROMBIN TIME: CPT

## 2025-07-16 PROCEDURE — 86900 BLOOD TYPING SEROLOGIC ABO: CPT

## 2025-07-16 PROCEDURE — 99285 EMERGENCY DEPT VISIT HI MDM: CPT | Mod: 25

## 2025-07-16 PROCEDURE — 96361 HYDRATE IV INFUSION ADD-ON: CPT

## 2025-07-16 PROCEDURE — 96375 TX/PRO/DX INJ NEW DRUG ADDON: CPT

## 2025-07-16 PROCEDURE — 85730 THROMBOPLASTIN TIME PARTIAL: CPT

## 2025-07-16 PROCEDURE — 82310 ASSAY OF CALCIUM: CPT

## 2025-07-16 PROCEDURE — 258N000003 HC RX IP 258 OP 636

## 2025-07-16 PROCEDURE — 250N000011 HC RX IP 250 OP 636

## 2025-07-16 PROCEDURE — 36415 COLL VENOUS BLD VENIPUNCTURE: CPT

## 2025-07-16 PROCEDURE — 96374 THER/PROPH/DIAG INJ IV PUSH: CPT | Mod: 59

## 2025-07-16 PROCEDURE — 85004 AUTOMATED DIFF WBC COUNT: CPT

## 2025-07-16 RX ORDER — PANTOPRAZOLE SODIUM 40 MG/1
40 TABLET, DELAYED RELEASE ORAL 2 TIMES DAILY
Qty: 28 TABLET | Refills: 0 | Status: CANCELLED | OUTPATIENT
Start: 2025-07-16 | End: 2025-07-30

## 2025-07-16 RX ORDER — METHYLPREDNISOLONE SODIUM SUCCINATE 125 MG/2ML
125 INJECTION INTRAMUSCULAR; INTRAVENOUS ONCE
Status: COMPLETED | OUTPATIENT
Start: 2025-07-16 | End: 2025-07-16

## 2025-07-16 RX ORDER — DIPHENHYDRAMINE HYDROCHLORIDE 50 MG/ML
25 INJECTION, SOLUTION INTRAMUSCULAR; INTRAVENOUS ONCE
Status: COMPLETED | OUTPATIENT
Start: 2025-07-16 | End: 2025-07-16

## 2025-07-16 RX ORDER — PANTOPRAZOLE SODIUM 40 MG/1
40 TABLET, DELAYED RELEASE ORAL 2 TIMES DAILY
Qty: 28 TABLET | Refills: 0 | Status: SHIPPED | OUTPATIENT
Start: 2025-07-16 | End: 2025-07-30

## 2025-07-16 RX ORDER — IOPAMIDOL 755 MG/ML
66 INJECTION, SOLUTION INTRAVASCULAR ONCE
Status: COMPLETED | OUTPATIENT
Start: 2025-07-16 | End: 2025-07-16

## 2025-07-16 RX ADMIN — SODIUM CHLORIDE 56 ML: 9 INJECTION, SOLUTION INTRAVENOUS at 14:51

## 2025-07-16 RX ADMIN — SODIUM CHLORIDE 500 ML: 0.9 INJECTION, SOLUTION INTRAVENOUS at 14:36

## 2025-07-16 RX ADMIN — IOPAMIDOL 66 ML: 755 INJECTION, SOLUTION INTRAVENOUS at 14:51

## 2025-07-16 RX ADMIN — PANTOPRAZOLE SODIUM 40 MG: 40 INJECTION, POWDER, FOR SOLUTION INTRAVENOUS at 16:39

## 2025-07-16 RX ADMIN — DIPHENHYDRAMINE HYDROCHLORIDE 25 MG: 50 INJECTION INTRAMUSCULAR; INTRAVENOUS at 14:41

## 2025-07-16 RX ADMIN — METHYLPREDNISOLONE SODIUM SUCCINATE 125 MG: 125 INJECTION, POWDER, FOR SOLUTION INTRAMUSCULAR; INTRAVENOUS at 14:39

## 2025-07-16 ASSESSMENT — ACTIVITIES OF DAILY LIVING (ADL)
ADLS_ACUITY_SCORE: 51

## 2025-07-16 NOTE — ED TRIAGE NOTES
Presents with 4 episodes of black tarry stools in 2 days. Pt is on steroids and ibuprofen for a tooth infection. Had tooth pulled on Monday. Also on baby ASA 81mg.     Denies abdominal pain, dizziness, or SOB   Triage Assessment (Adult)       Row Name 07/16/25 1321          Triage Assessment    Airway WDL WDL        Respiratory WDL    Respiratory WDL WDL        Skin Circulation/Temperature WDL    Skin Circulation/Temperature WDL WDL        Cardiac WDL    Cardiac WDL WDL        Peripheral/Neurovascular WDL    Peripheral Neurovascular WDL WDL        Cognitive/Neuro/Behavioral WDL    Cognitive/Neuro/Behavioral WDL WDL

## 2025-07-16 NOTE — TELEPHONE ENCOUNTER
Patient Quality Outreach    Patient is due for the following:   Physical Annual Wellness Visit    Action(s) Taken:   Schedule a Annual Wellness Visit    Type of outreach:    Sent [employer] message.    Questions for provider review:    None         Pebbles Shah CMA  Chart routed to None.

## 2025-07-16 NOTE — ED PROVIDER NOTES
History     Chief Complaint   Patient presents with    Melena       Catherine Salgado is a 85 year old female with significant pmhx of HTN, HLD, hypothyroidism, CKD who presents for evaluation of melena.  Patient presents with her  and daughter who help provide history.  Patient was recently treated for dental pain after an urgent care visit on Saturday with a course of antibiotics and a 3-day burst of prednisone.  She had also been taking ibuprofen for her dental pain.  She had the tooth pulled on Monday (2 days ago).  Yesterday she developed what she describes as explosive diarrhea with black melanotic stools.  She has had at least 4 episodes of melena.  She takes a baby aspirin daily, but no blood thinners.  She states she has a history of peptic ulcer as a child, but has not had any known recurrences and does not take a PPI or other antacid.  She denies associated fever, nausea/vomiting, abdominal pain.    Allergies:  Allergies   Allergen Reactions    Wasps [Hornets]      Was stung in face by wasp and had significant facial swelling, Aug 2018    Bees     Codeine Other (See Comments)     Confusion, was given Tylenol # 3 after hysterectomy and developed mild confusion     Contrast Dye Other (See Comments)     Severe arthritic reaction    Gadavist [Gadobutrol] Other (See Comments)     Did ok if benadryl is given.     Pcn [Penicillins] Hives       Problem List:    Patient Active Problem List    Diagnosis Date Noted    Syncope, unspecified syncope type 02/11/2025     Priority: Medium    Chronic kidney disease, stage 3a (H) 04/12/2024     Priority: Medium    Tobacco abuse 05/20/2021     Priority: Medium    Cerebral infarction (H) 10/31/2015     Priority: Medium     Diagnosis updated by automated process. Provider to review and confirm.      Hypothyroidism 08/14/2013     Priority: Medium    HYPERLIPIDEMIA LDL GOAL <130 10/31/2010     Priority: Medium    Hypertension goal BP (blood pressure) < 140/90       Priority: Medium     dx date - ?  CV risk factors previous smoker, family history and lipids  FH positive for diabetes mellitus and cardiovascular disease  history of renal disease negative      Macular degeneration (senile) of retina      Priority: Medium     Problem list name updated by automated process. Provider to review      Hyperlipidemia      Priority: Medium     CHOL      282   03/11/2005  LDL      177   03/11/2005  HDL       35   03/11/2005  Problem list name updated by automated process. Provider to review      Bunion      Priority: Medium     Bilateral      Generalized osteoarthrosis, unspecified site      Priority: Medium     Cervical          Past Medical History:    Past Medical History:   Diagnosis Date    Bunion     Chest pain, unspecified     Generalized osteoarthrosis, unspecified site     Macular degeneration (senile) of retina, unspecified     Other and unspecified hyperlipidemia     Phlebitis and thrombophlebitis of other deep vessels of lower extremities     TOBACCO ABUSE-CONTINUOUS     Unspecified essential hypertension        Past Surgical History:    Past Surgical History:   Procedure Laterality Date    COLONOSCOPY  5/27/2005    Diverticulosis    SURGICAL HISTORY OF -       T&A    SURGICAL HISTORY OF -   10/1976    Vaginal hysterectomy, A & P repair       Family History:    Family History   Problem Relation Age of Onset    Cerebrovascular Disease Mother         bianca hemed    C.A.DKatherine Father     Cancer Father         kidney CA    Cardiovascular Sister         cardiac arrest    Musculoskeletal Disorder Daughter         rotator cuff, knee repair    Diabetes Sister     Musculoskeletal Disorder Daughter         ankle and arm injuries    Musculoskeletal Disorder Daughter         Knees    Hypertension Sister        Social History:  Marital Status:  Single [1]  Social History     Tobacco Use    Smoking status: Every Day     Current packs/day: 0.50     Average packs/day: 0.5 packs/day for 50.0  years (25.0 ttl pk-yrs)     Types: Cigarettes    Smokeless tobacco: Never    Tobacco comments:     Under a half a pack a day about    Vaping Use    Vaping status: Never Used   Substance Use Topics    Alcohol use: Not Currently     Comment: rare    Drug use: No        Medications:    pantoprazole (PROTONIX) 40 MG EC tablet  aspirin 81 MG EC tablet  azithromycin (ZITHROMAX) 250 MG tablet  cefdinir (OMNICEF) 300 MG capsule  EPINEPHrine (ANY BX GENERIC EQUIV) 0.3 MG/0.3ML injection 2-pack  levothyroxine (SYNTHROID/LEVOTHROID) 75 MCG tablet  lisinopril (ZESTRIL) 10 MG tablet  tetrahydrozoline (VISINE) 0.05 % ophthalmic solution  VITAMIN E PO          Physical Exam   BP: (!) 158/84  Pulse: 51  Temp: 98  F (36.7  C)  Resp: 18  Weight: 61.7 kg (136 lb)  SpO2: 94 %      Physical Exam  Vitals and nursing note reviewed.   Constitutional:       General: She is not in acute distress.     Appearance: She is not ill-appearing, toxic-appearing or diaphoretic.   HENT:      Head: Normocephalic and atraumatic.   Eyes:      Extraocular Movements: Extraocular movements intact.      Pupils: Pupils are equal, round, and reactive to light.   Cardiovascular:      Rate and Rhythm: Normal rate and regular rhythm.      Pulses: Normal pulses.   Pulmonary:      Effort: Pulmonary effort is normal.   Abdominal:      Palpations: Abdomen is soft.      Tenderness: There is abdominal tenderness in the epigastric area. There is no guarding or rebound.   Musculoskeletal:         General: Normal range of motion.      Cervical back: Normal range of motion.   Skin:     General: Skin is warm.   Neurological:      General: No focal deficit present.      Mental Status: She is alert and oriented to person, place, and time.   Psychiatric:         Mood and Affect: Mood normal.         Behavior: Behavior normal.         ED Course        Procedures                    Recent Results (from the past 24 hours)   CBC with platelets differential    Narrative    The  following orders were created for panel order CBC with platelets differential.  Procedure                               Abnormality         Status                     ---------                               -----------         ------                     CBC with platelets and ...[[id number]]                      Final result                 Please view results for these tests on the individual orders.   INR   Result Value Ref Range    INR 0.92 0.85 - 1.15    PT 12.3 11.8 - 14.8 Seconds   Partial thromboplastin time   Result Value Ref Range    aPTT 26 22 - 38 Seconds   Basic metabolic panel   Result Value Ref Range    Sodium 141 135 - 145 mmol/L    Potassium 4.6 3.4 - 5.3 mmol/L    Chloride 105 98 - 107 mmol/L    Carbon Dioxide (CO2) 27 22 - 29 mmol/L    Anion Gap 9 7 - 15 mmol/L    Urea Nitrogen 33.0 (H) 8.0 - 23.0 mg/dL    Creatinine 1.39 (H) 0.51 - 0.95 mg/dL    GFR Estimate 37 (L) >60 mL/min/1.73m2    Calcium 10.0 8.8 - 10.4 mg/dL    Glucose 86 70 - 99 mg/dL   Hepatic function panel   Result Value Ref Range    Protein Total 7.1 6.4 - 8.3 g/dL    Albumin 3.8 3.5 - 5.2 g/dL    Bilirubin Total 0.4 <=1.2 mg/dL    Alkaline Phosphatase 51 40 - 150 U/L    AST 28 0 - 45 U/L    ALT 23 0 - 50 U/L    Bilirubin Direct 0.12 0.00 - 0.30 mg/dL   ABO/Rh type and screen    Narrative    The following orders were created for panel order ABO/Rh type and screen.  Procedure                               Abnormality         Status                     ---------                               -----------         ------                     Adult Type and Screen[[id number]]                           Final result                 Please view results for these tests on the individual orders.   CBC with platelets and differential   Result Value Ref Range    WBC Count 8.8 4.0 - 11.0 10e3/uL    RBC Count 4.53 3.80 - 5.20 10e6/uL    Hemoglobin 14.1 11.7 - 15.7 g/dL    Hematocrit 43.1 35.0 - 47.0 %    MCV 95 78 - 100 fL    MCH 31.1 26.5 - 33.0 pg     MCHC 32.7 31.5 - 36.5 g/dL    RDW 14.3 10.0 - 15.0 %    Platelet Count 301 150 - 450 10e3/uL    % Neutrophils 56 %    % Lymphocytes 34 %    % Monocytes 6 %    % Eosinophils 3 %    % Basophils 1 %    % Immature Granulocytes 1 %    NRBCs per 100 WBC 0 <1 /100    Absolute Neutrophils 4.9 1.6 - 8.3 10e3/uL    Absolute Lymphocytes 3.0 0.8 - 5.3 10e3/uL    Absolute Monocytes 0.6 0.0 - 1.3 10e3/uL    Absolute Eosinophils 0.2 0.0 - 0.7 10e3/uL    Absolute Basophils 0.0 0.0 - 0.2 10e3/uL    Absolute Immature Granulocytes 0.1 <=0.4 10e3/uL    Absolute NRBCs 0.0 10e3/uL   Adult Type and Screen   Result Value Ref Range    ABO/RH(D) O POS     Antibody Screen Negative Negative    SPECIMEN EXPIRATION DATE 7/19/2025 11:59:00 PM CDT    CT Abdomen Pelvis w Contrast    Narrative    EXAM: CT ABDOMEN PELVIS W CONTRAST  LOCATION: [facility]  DATE: 7/16/2025    INDICATION: Epigastric abdominal pain and melena x2 days.  COMPARISON: None.  TECHNIQUE: CT scan of the abdomen and pelvis was performed following injection of IV contrast. Multiplanar reformats were obtained. Dose reduction techniques were used.  CONTRAST: 66 mL Isovue 370.    FINDINGS:   LOWER CHEST: Normal.    HEPATOBILIARY: Multiple dependent calcified stones are seen within the gallbladder. The portal vein is patent. No focal lesions through the liver.    PANCREAS: Normal.    SPLEEN: Normal.    ADRENAL GLANDS: Mild thickening is seen through the left adrenal gland without discrete nodule.    KIDNEYS/BLADDER: Multiple tiny nonobstructing stones are seen through both kidneys measuring up to 4 mm. No enhancing mass or hydronephrosis on either side.    BOWEL: Diffuse wall thickening through the gastric antrum could be due to related to peristalsis or gastritis. No adjacent inflammatory change or edema. No bowel obstruction. No colonic wall thickening or inflammatory change. Sigmoid diverticulosis is   noted without evidence of  diverticulitis.    LYMPH NODES: Normal.    VASCULATURE: The aorta is normal in caliber with extensive atherosclerotic calcification.    PELVIC ORGANS: Normal.    MUSCULOSKELETAL: Degenerative changes are noted through the spine.      Impression    IMPRESSION:   1.  Diffuse wall thickening through the gastric antrum could be due to peristalsis or gastritis. No adjacent inflammatory change or edema.  2.  Cholelithiasis without evidence for cholecystitis.  3.  Multiple tiny nonobstructing stones through both kidneys.         Medications   pantoprazole (PROTONIX) injection 40 mg (has no administration in time range)   methylPREDNISolone Na Suc (solu-MEDROL) injection 125 mg (125 mg Intravenous $Given 7/16/25 1439)   diphenhydrAMINE (BENADRYL) injection 25 mg (25 mg Intravenous $Given 7/16/25 1441)   sodium chloride 0.9% BOLUS 500 mL (500 mLs Intravenous $New Bag 7/16/25 1436)   iopamidol (ISOVUE-370) solution 66 mL (66 mLs Intravenous $Given 7/16/25 1451)   sodium chloride 0.9 % bag for CT scan flush (56 mLs Intravenous $Given 7/16/25 1451)       Assessments & Plan (with Medical Decision Making)     I have reviewed the nursing notes.    I have reviewed the findings, diagnosis, plan and need for follow up with the patient.    Medical Decision Making  Catherine Salgado is a 85 year old female with significant pmhx of HTN, HLD, hypothyroidism, CKD who presents for evaluation of melena.  Differential diagnoses include PUD, gastritis, varices, lower GI bleed.  Patient is mildly hypertensive at 158/84.  Heart rate is 51, which is around patient's baseline per review of her recent ambulatory cardiac monitor.  She is afebrile, and she is satting 94% on room air with regular respiratory rate and effort.    On examination patient is alert, oriented, no acute distress.  She is speaking in full sentences without difficulty and answering questions appropriately.  Abdomen is soft to palpation, but patient winces when I palpate the  "epigastrium. She states it is uncomfortable but not painful.  No guarding or rebound.  No left lower quadrant tenderness. CBC is negative for leukocytosis, and hgb is normal at 14.1. BMP with creatinine of 1.39 which is around the patient's baseline, as well as elevated BUN of 33 which is also typically elevated around 30. LFTs are WNL. PTT and PT/INR are within normal limits. Patient has a hx of contrast allergy listed as \"severe arthritic reaction\" and in the past has been pre-treated with IV solumedrol and benadryl. I am concerned about possible gastritis vs ulcer caused by her recent prednisone and NSAID use, but benefits of pretreatment to obtain appropriate imaging outweigh risks. CT abdomen/pelvis was obtained and revealed findings of wall thickening in the gastric antrum which could indicate gastritis. There is no adjacent inflammation or edema. No other acute findings.     Suspect gastritis or ulcer from combination of recent treatment with PO prednisone, ibuprofen, and antibiotic for dental infection. I consulted Dr. Mora with surgery who recommended outpatient EGD given stable hgb, no symptoms of n/v, lightheadedness, SOA, abdominal pain, or vital sign abnormalities with stable baseline lab work up. Reassuringly patient remained asymptomatic throughout her 3.5h evaluation, and had no further episodes of melena in the department (last BM was this morning). She received IVF and pantoprazole. She was instructed to start pantoprazole 40mg BID, discontinue ibuprofen and other nsaids. Strongly encouraged her to take her antibiotics with food. Emergent referral placed for outpatient EGD. She was given very strict return precautions should she develop profuse melanotic stools, fever, lightheadedness, abdominal pain, vomiting, difficulty breathing, or if other concerning symptoms develop. Patient, , and daughter all voiced understanding of the plan, and daughter stated she would help facilitate " scheduling for EGD and follow up. They had no further questions.       New Prescriptions    PANTOPRAZOLE (PROTONIX) 40 MG EC TABLET    Take 1 tablet (40 mg) by mouth 2 times daily for 14 days.       Final diagnoses:   Acute gastritis, presence of bleeding unspecified, unspecified gastritis type   Melena       Abigail Sosa PA-C  July 16, 2025  [facility] EMERGENCY DEPT     Abigail Sosa PA-C  07/16/25 [id number]

## 2025-07-17 LAB — BACTERIA SPEC CULT: NORMAL

## 2025-07-19 LAB — BACTERIA SPEC CULT: NO GROWTH

## 2025-08-04 ENCOUNTER — HOSPITAL ENCOUNTER (OUTPATIENT)
Dept: NUCLEAR MEDICINE | Facility: CLINIC | Age: 85
Setting detail: NUCLEAR MEDICINE
Discharge: HOME | End: 2025-08-04
Attending: NURSE PRACTITIONER
Payer: MEDICARE

## 2025-08-04 ENCOUNTER — HOSPITAL ENCOUNTER (OUTPATIENT)
Dept: CARDIOLOGY | Facility: CLINIC | Age: 85
Setting detail: NUCLEAR MEDICINE
Discharge: HOME | End: 2025-08-04
Attending: NURSE PRACTITIONER
Payer: MEDICARE

## 2025-08-04 VITALS — BODY MASS INDEX: 23.22 KG/M2 | HEIGHT: 64 IN | WEIGHT: 136 LBS

## 2025-08-04 DIAGNOSIS — R61 DIAPHORESIS: ICD-10-CM

## 2025-08-04 DIAGNOSIS — I10 ESSENTIAL HYPERTENSION: ICD-10-CM

## 2025-08-04 DIAGNOSIS — R55 SYNCOPE, UNSPECIFIED SYNCOPE TYPE: ICD-10-CM

## 2025-08-04 DIAGNOSIS — R94.31 ABNORMAL ELECTROCARDIOGRAM (ECG) (EKG): ICD-10-CM

## 2025-08-04 LAB
CV BLOOD PRESSURE: 68 MMHG
CV STRESS MAX HR HE: 82
RATE PRESSURE PRODUCT: 7872
STRESS ECHO BASELINE DIASTOLIC HE: 77
STRESS ECHO BASELINE HR: 56 BPM
STRESS ECHO BASELINE SYSTOLIC BP: 117
STRESS ECHO CALCULATED PERCENT HR: 60 %
STRESS ECHO LAST STRESS DIASTOLIC BP: 56
STRESS ECHO LAST STRESS SYSTOLIC BP: 96
STRESS ECHO TARGET HR: 137

## 2025-08-04 PROCEDURE — 78452 HT MUSCLE IMAGE SPECT MULT: CPT

## 2025-08-04 PROCEDURE — 93018 CV STRESS TEST I&R ONLY: CPT | Performed by: INTERNAL MEDICINE

## 2025-08-04 PROCEDURE — A9502 TC99M TETROFOSMIN: HCPCS | Performed by: NURSE PRACTITIONER

## 2025-08-04 PROCEDURE — 250N000011 HC RX IP 250 OP 636: Performed by: NURSE PRACTITIONER

## 2025-08-04 PROCEDURE — 93017 CV STRESS TEST TRACING ONLY: CPT

## 2025-08-04 PROCEDURE — 78452 HT MUSCLE IMAGE SPECT MULT: CPT | Mod: 26 | Performed by: INTERNAL MEDICINE

## 2025-08-04 PROCEDURE — 343N000001 HC RX 343 MED OP 636: Performed by: NURSE PRACTITIONER

## 2025-08-04 PROCEDURE — 93016 CV STRESS TEST SUPVJ ONLY: CPT | Performed by: STUDENT IN AN ORGANIZED HEALTH CARE EDUCATION/TRAINING PROGRAM

## 2025-08-04 RX ORDER — REGADENOSON 0.08 MG/ML
0.4 INJECTION, SOLUTION INTRAVENOUS ONCE
Status: COMPLETED | OUTPATIENT
Start: 2025-08-04 | End: 2025-08-04

## 2025-08-04 RX ADMIN — TETROFOSMIN 8.3 MILLICURIE: 1.38 INJECTION, POWDER, LYOPHILIZED, FOR SOLUTION INTRAVENOUS at 12:33

## 2025-08-04 RX ADMIN — TETROFOSMIN 25.8 MILLICURIE: 1.38 INJECTION, POWDER, LYOPHILIZED, FOR SOLUTION INTRAVENOUS at 13:58

## 2025-08-04 RX ADMIN — REGADENOSON 0.4 MG: 0.08 INJECTION, SOLUTION INTRAVENOUS at 13:47

## 2025-08-06 ENCOUNTER — TELEPHONE (OUTPATIENT)
Dept: CARDIOLOGY | Facility: CLINIC | Age: 85
End: 2025-08-06
Payer: MEDICARE

## (undated) RX ORDER — REGADENOSON 0.08 MG/ML
INJECTION, SOLUTION INTRAVENOUS
Status: DISPENSED
Start: 2025-08-04